# Patient Record
Sex: FEMALE | Race: WHITE
[De-identification: names, ages, dates, MRNs, and addresses within clinical notes are randomized per-mention and may not be internally consistent; named-entity substitution may affect disease eponyms.]

---

## 2019-01-15 ENCOUNTER — HOSPITAL ENCOUNTER (OUTPATIENT)
Dept: HOSPITAL 47 - RADMAMWWP | Age: 74
Discharge: HOME | End: 2019-01-15
Attending: FAMILY MEDICINE
Payer: MEDICARE

## 2019-01-15 DIAGNOSIS — Z12.31: Primary | ICD-10-CM

## 2019-01-15 PROCEDURE — 77067 SCR MAMMO BI INCL CAD: CPT

## 2019-01-15 PROCEDURE — 77063 BREAST TOMOSYNTHESIS BI: CPT

## 2019-01-27 NOTE — MM
Reason for exam: screening  (asymptomatic).



History:

Patient is postmenopausal.  No children.  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



MG 3D Screening Mammo W/Cad

Bilateral CC and MLO view(s) were taken.  XCCL view(s) were taken of the right 

breast.

No prior studies available for comparison.

There are scattered fibroglandular densities.

No discrete abnormality.  Previous images unavailable at this time, if they come 

available an addendum can be done.





ASSESSMENT: Benign, BI-RAD 2



RECOMMENDATION:

Routine screening mammogram of both breasts in 1 year.

## 2020-01-31 ENCOUNTER — HOSPITAL ENCOUNTER (OUTPATIENT)
Dept: HOSPITAL 47 - RADMAMWWP | Age: 75
Discharge: HOME | End: 2020-01-31
Attending: FAMILY MEDICINE
Payer: MEDICARE

## 2020-01-31 DIAGNOSIS — Z12.31: Primary | ICD-10-CM

## 2020-01-31 PROCEDURE — 77063 BREAST TOMOSYNTHESIS BI: CPT

## 2020-01-31 PROCEDURE — 77067 SCR MAMMO BI INCL CAD: CPT

## 2020-02-03 NOTE — MM
Reason for exam: screening  (asymptomatic).

Last mammogram was performed 1 year and 1 month ago.



History:

Patient is postmenopausal.



Physical Findings:

A clinical breast exam by your physician is recommended on an annual basis and 

results should be correlated with mammographic findings.



MG 3D Screening Mammo W/Cad

Bilateral CC and MLO view(s) were taken.

Prior study comparison: January 15, 2019, bilateral MG 3d screening mammo w/cad.

There are scattered fibroglandular densities.  Benign appearing bilateral 

calcifications. No suspicious abnormality.  No significant changes when compared 

with prior studies.





ASSESSMENT: Benign, BI-RAD 2



RECOMMENDATION:

Routine screening mammogram of both breasts in 1 year.

## 2020-02-04 ENCOUNTER — HOSPITAL ENCOUNTER (INPATIENT)
Dept: HOSPITAL 47 - EC | Age: 75
LOS: 8 days | Discharge: HOME | DRG: 682 | End: 2020-02-12
Attending: HOSPITALIST | Admitting: HOSPITALIST
Payer: MEDICARE

## 2020-02-04 VITALS — BODY MASS INDEX: 43.5 KG/M2

## 2020-02-04 DIAGNOSIS — Z79.890: ICD-10-CM

## 2020-02-04 DIAGNOSIS — M19.91: ICD-10-CM

## 2020-02-04 DIAGNOSIS — E11.649: ICD-10-CM

## 2020-02-04 DIAGNOSIS — N17.0: Primary | ICD-10-CM

## 2020-02-04 DIAGNOSIS — F32.9: ICD-10-CM

## 2020-02-04 DIAGNOSIS — Z90.49: ICD-10-CM

## 2020-02-04 DIAGNOSIS — E11.22: ICD-10-CM

## 2020-02-04 DIAGNOSIS — Z90.710: ICD-10-CM

## 2020-02-04 DIAGNOSIS — G93.41: ICD-10-CM

## 2020-02-04 DIAGNOSIS — I12.9: ICD-10-CM

## 2020-02-04 DIAGNOSIS — Z98.890: ICD-10-CM

## 2020-02-04 DIAGNOSIS — E78.5: ICD-10-CM

## 2020-02-04 DIAGNOSIS — E87.5: ICD-10-CM

## 2020-02-04 DIAGNOSIS — M89.8X9: ICD-10-CM

## 2020-02-04 DIAGNOSIS — N18.4: ICD-10-CM

## 2020-02-04 DIAGNOSIS — F41.9: ICD-10-CM

## 2020-02-04 DIAGNOSIS — Z85.42: ICD-10-CM

## 2020-02-04 DIAGNOSIS — Z82.49: ICD-10-CM

## 2020-02-04 DIAGNOSIS — E11.65: ICD-10-CM

## 2020-02-04 DIAGNOSIS — Z88.5: ICD-10-CM

## 2020-02-04 DIAGNOSIS — E66.01: ICD-10-CM

## 2020-02-04 DIAGNOSIS — Z79.899: ICD-10-CM

## 2020-02-04 DIAGNOSIS — Z83.3: ICD-10-CM

## 2020-02-04 DIAGNOSIS — E87.70: ICD-10-CM

## 2020-02-04 DIAGNOSIS — Z87.891: ICD-10-CM

## 2020-02-04 DIAGNOSIS — T46.4X5A: ICD-10-CM

## 2020-02-04 DIAGNOSIS — E83.39: ICD-10-CM

## 2020-02-04 DIAGNOSIS — E87.2: ICD-10-CM

## 2020-02-04 DIAGNOSIS — Z79.4: ICD-10-CM

## 2020-02-04 DIAGNOSIS — E83.42: ICD-10-CM

## 2020-02-04 LAB
ALBUMIN SERPL-MCNC: 3.8 G/DL (ref 3.5–5)
ALP SERPL-CCNC: 162 U/L (ref 38–126)
ALT SERPL-CCNC: 15 U/L (ref 4–34)
ANION GAP SERPL CALC-SCNC: 15 MMOL/L
APTT BLD: 22.5 SEC (ref 22–30)
AST SERPL-CCNC: 26 U/L (ref 14–36)
BASOPHILS # BLD AUTO: 0.1 K/UL (ref 0–0.2)
BASOPHILS NFR BLD AUTO: 1 %
BUN SERPL-SCNC: 70 MG/DL (ref 7–17)
CALCIUM SPEC-MCNC: 8.6 MG/DL (ref 8.4–10.2)
CHLORIDE SERPL-SCNC: 113 MMOL/L (ref 98–107)
CO2 SERPL-SCNC: 12 MMOL/L (ref 22–30)
EOSINOPHIL # BLD AUTO: 0.1 K/UL (ref 0–0.7)
EOSINOPHIL NFR BLD AUTO: 2 %
ERYTHROCYTE [DISTWIDTH] IN BLOOD BY AUTOMATED COUNT: 3.89 M/UL (ref 3.8–5.4)
ERYTHROCYTE [DISTWIDTH] IN BLOOD: 14.8 % (ref 11.5–15.5)
GLUCOSE BLD-MCNC: 109 MG/DL (ref 75–99)
GLUCOSE BLD-MCNC: 88 MG/DL (ref 75–99)
GLUCOSE SERPL-MCNC: 200 MG/DL (ref 74–99)
HCT VFR BLD AUTO: 36 % (ref 34–46)
HGB BLD-MCNC: 11.2 GM/DL (ref 11.4–16)
HYALINE CASTS UR QL AUTO: 3 /LPF (ref 0–2)
INR PPP: 0.9 (ref ?–1.2)
LYMPHOCYTES # SPEC AUTO: 1.5 K/UL (ref 1–4.8)
LYMPHOCYTES NFR SPEC AUTO: 23 %
MAGNESIUM SPEC-SCNC: 1.5 MG/DL (ref 1.6–2.3)
MCH RBC QN AUTO: 28.7 PG (ref 25–35)
MCHC RBC AUTO-ENTMCNC: 31.1 G/DL (ref 31–37)
MCV RBC AUTO: 92.5 FL (ref 80–100)
MONOCYTES # BLD AUTO: 0.4 K/UL (ref 0–1)
MONOCYTES NFR BLD AUTO: 6 %
NEUTROPHILS # BLD AUTO: 4.3 K/UL (ref 1.3–7.7)
NEUTROPHILS NFR BLD AUTO: 66 %
PH UR: 5.5 [PH] (ref 5–8)
PLATELET # BLD AUTO: 293 K/UL (ref 150–450)
POTASSIUM SERPL-SCNC: 5.7 MMOL/L (ref 3.5–5.1)
PROT SERPL-MCNC: 7.1 G/DL (ref 6.3–8.2)
PROT UR QL: (no result)
PT BLD: 9.6 SEC (ref 9–12)
RBC UR QL: 4 /HPF (ref 0–5)
SODIUM SERPL-SCNC: 140 MMOL/L (ref 137–145)
SP GR UR: 1.01 (ref 1–1.03)
SQUAMOUS UR QL AUTO: 5 /HPF (ref 0–4)
UROBILINOGEN UR QL STRIP: <2 MG/DL (ref ?–2)
WBC # BLD AUTO: 6.5 K/UL (ref 3.8–10.6)
WBC # UR AUTO: 6 /HPF (ref 0–5)

## 2020-02-04 PROCEDURE — 85730 THROMBOPLASTIN TIME PARTIAL: CPT

## 2020-02-04 PROCEDURE — 99285 EMERGENCY DEPT VISIT HI MDM: CPT

## 2020-02-04 PROCEDURE — 77001 FLUOROGUIDE FOR VEIN DEVICE: CPT

## 2020-02-04 PROCEDURE — 94640 AIRWAY INHALATION TREATMENT: CPT

## 2020-02-04 PROCEDURE — 83735 ASSAY OF MAGNESIUM: CPT

## 2020-02-04 PROCEDURE — 93880 EXTRACRANIAL BILAT STUDY: CPT

## 2020-02-04 PROCEDURE — 80306 DRUG TEST PRSMV INSTRMNT: CPT

## 2020-02-04 PROCEDURE — 70450 CT HEAD/BRAIN W/O DYE: CPT

## 2020-02-04 PROCEDURE — 80048 BASIC METABOLIC PNL TOTAL CA: CPT

## 2020-02-04 PROCEDURE — 85025 COMPLETE CBC W/AUTO DIFF WBC: CPT

## 2020-02-04 PROCEDURE — 86706 HEP B SURFACE ANTIBODY: CPT

## 2020-02-04 PROCEDURE — 71045 X-RAY EXAM CHEST 1 VIEW: CPT

## 2020-02-04 PROCEDURE — 84484 ASSAY OF TROPONIN QUANT: CPT

## 2020-02-04 PROCEDURE — 83880 ASSAY OF NATRIURETIC PEPTIDE: CPT

## 2020-02-04 PROCEDURE — 36415 COLL VENOUS BLD VENIPUNCTURE: CPT

## 2020-02-04 PROCEDURE — 96361 HYDRATE IV INFUSION ADD-ON: CPT

## 2020-02-04 PROCEDURE — 94760 N-INVAS EAR/PLS OXIMETRY 1: CPT

## 2020-02-04 PROCEDURE — 71046 X-RAY EXAM CHEST 2 VIEWS: CPT

## 2020-02-04 PROCEDURE — 90935 HEMODIALYSIS ONE EVALUATION: CPT

## 2020-02-04 PROCEDURE — 86704 HEP B CORE ANTIBODY TOTAL: CPT

## 2020-02-04 PROCEDURE — 85610 PROTHROMBIN TIME: CPT

## 2020-02-04 PROCEDURE — 80053 COMPREHEN METABOLIC PANEL: CPT

## 2020-02-04 PROCEDURE — 81001 URINALYSIS AUTO W/SCOPE: CPT

## 2020-02-04 PROCEDURE — 84100 ASSAY OF PHOSPHORUS: CPT

## 2020-02-04 PROCEDURE — 93005 ELECTROCARDIOGRAM TRACING: CPT

## 2020-02-04 PROCEDURE — 96374 THER/PROPH/DIAG INJ IV PUSH: CPT

## 2020-02-04 PROCEDURE — 87340 HEPATITIS B SURFACE AG IA: CPT

## 2020-02-04 PROCEDURE — 76937 US GUIDE VASCULAR ACCESS: CPT

## 2020-02-04 PROCEDURE — 36558 INSERT TUNNELED CV CATH: CPT

## 2020-02-04 RX ADMIN — CEFAZOLIN SCH MLS/HR: 330 INJECTION, POWDER, FOR SOLUTION INTRAMUSCULAR; INTRAVENOUS at 21:37

## 2020-02-04 RX ADMIN — OXYCODONE HYDROCHLORIDE AND ACETAMINOPHEN PRN EACH: 10; 325 TABLET ORAL at 21:36

## 2020-02-04 RX ADMIN — CYANOCOBALAMIN TAB 500 MCG SCH MCG: 500 TAB at 21:37

## 2020-02-04 RX ADMIN — ONDANSETRON PRN MG: 2 INJECTION INTRAMUSCULAR; INTRAVENOUS at 23:48

## 2020-02-04 RX ADMIN — DILTIAZEM HYDROCHLORIDE SCH MG: 60 TABLET, FILM COATED ORAL at 21:36

## 2020-02-04 NOTE — CT
EXAMINATION TYPE: CT brain wo con

 

DATE OF EXAM: 2/4/2020

 

COMPARISON: None

 

HISTORY: weakness and dizziness.

 

CT DLP: 1074.4 mGycm

Automated exposure control for dose reduction was used.

 

Multiple axial sections were obtained of the brain without contrast.

 

There is mild cerebral atrophy. There is no mass effect nor midline shift. There is no sign of intrac
ranial hemorrhage. Calvarium is intact. There is no evidence of cerebral edema.

 

IMPRESSION:

Mild atrophy. No acute intracranial abnormality.

## 2020-02-04 NOTE — XR
EXAMINATION TYPE: XR chest 2V

 

DATE OF EXAM: 2/4/2020

 

COMPARISON: NONE

 

HISTORY: Short of breath

 

TECHNIQUE: 2 views

 

FINDINGS: There is elevated right diaphragm. There is linear density right lung base. There is no hea
rt failure. Left lung is clear. There are chest leads. Heart size is fairly normal.

 

IMPRESSION: There is some atelectasis right lung base with elevated right diaphragm.

## 2020-02-04 NOTE — ED
General Adult HPI





- General


Source: patient, family, RN notes reviewed, old records reviewed


Mode of arrival: ambulatory


Limitations: no limitations





<Presley Perez - Last Filed: 02/04/20 16:46>





<Presley Brady - Last Filed: 02/04/20 18:07>





- General


Chief complaint: Shortness of Breath


Stated complaint: LANETTE


Time Seen by Provider: 02/04/20 15:00





- History of Present Illness


Initial comments: 





This is a 74-year-old female presents emergency Department stating that since 

last Wednesday she has not been feeling well.  Patient states on Wednesday her 

sugar was low at 43 and EMS was called they gave her some glucose and she's felt

at that time.  Patient states since then however she vomits almost every time 

she eats.  Patient also states she's more short of breath when she ambulates.  

Patient states she's always somewhat short of breath when she walks around but 

since Wednesday is worse.  Patient also states that she is very forgetful and 

that is significantly worsen her baseline and it all occurred since last 

Wednesday.  Patient denies any focal deficits.  Patient denies any 

lightheadedness or dizziness.  Patient denies any chest pain or palpitations.  

Patient states while lying in bed she's had no difficulty breathing.  Patient 

denies any fever chills or cough per patient denies abdominal pain.  Patient 

denies any diarrhea.  Fact patient states she thinks she is mildly constipated. 

Patient denies any swelling to the legs.  Patient denies starting any new 

medications (Presley Perez)





- Related Data


                                    Allergies











Allergy/AdvReac Type Severity Reaction Status Date / Time


 


naproxen [From Naprosyn] Allergy  Dyspnea Verified 02/04/20 14:47














Review of Systems


ROS Other: All systems not noted in ROS Statement are negative.





<Presley Perez - Last Filed: 02/04/20 16:46>


ROS Other: All systems not noted in ROS Statement are negative.





<Presley Brady - Last Filed: 02/04/20 18:07>


ROS Statement: 


Those systems with pertinent positive or pertinent negative responses have been 

documented in the HPI.








Past Medical History


Past Medical History: Cancer, Diabetes Mellitus, Hyperlipidemia, Hypertension, 

Renal Disease


Additional Past Medical History / Comment(s): uterine ca


History of Any Multi-Drug Resistant Organisms: None Reported


Past Surgical History: Bariatric Surgery, Cholecystectomy, Hysterectomy, Joint 

Replacement


Additional Past Surgical History / Comment(s): rufina knee,rt foot,


Past Psychological History: Depression


Smoking Status: Former smoker


Past Alcohol Use History: None Reported


Past Drug Use History: None Reported





<Presley Perez - Last Filed: 02/04/20 16:46>





General Exam


Limitations: no limitations





<Presley Perez - Last Filed: 02/04/20 16:46>





- General Exam Comments


Initial Comments: 





GENERAL:


Patient is well-developed and well-nourished.  Patient is nontoxic and well-

hydrated and is in  acute distress.





ENT:


Neck is soft and supple.  No significant lymphadenopathy is noted.  Oropharynx 

is clear.  Moist mucous membranes.  Neck has full range of motion without 

eliciting any pain.  





EYES:


The sclera were anicteric and conjunctiva were pink and moist.  Extraocular 

movements were intact and pupils were equal round and reactive to light.  

Eyelids were unremarkable.





PULMONARY:


Unlabored respirations.  Good breath sounds bilaterally.  No audible rales 

rhonchi or wheezing was noted.





CARDIOVASCULAR:


There is a regular rate and rhythm without any murmurs gallops or rubs.  





ABDOMEN:


Soft and nontender with normal bowel sounds.  No palpable organomegaly was 

noted.  There is no palpable pulsatile mass.





SKIN:


Skin is clear with no lesions or rashes and otherwise unremarkable.





NEUROLOGIC:


Patient is alert and oriented x3.  Cranial nerves II through XII are grossly 

intact.  Motor and sensory are also intact.  Normal speech, volume and content. 

Symmetrical smile.





MUSCULOSKELETAL:


Normal extremities with adequate strength and full range of motion.  





LYMPHATICS:


No significant lymphadenopathy is noted





PSYCHIATRIC:


Normal psychiatric evaluation. (Presley Perez)





Course





<Presley Brady - Last Filed: 02/04/20 18:07>





                                   Vital Signs











  02/04/20 02/04/20





  14:43 17:23


 


Temperature 98.0 F 


 


Pulse Rate 88 75


 


Respiratory 22 18





Rate  


 


Blood Pressure 182/82 158/77


 


O2 Sat by Pulse 97 97





Oximetry  














- Reevaluation(s)


Reevaluation #1: 





02/04/20 18:05


Medical records reviewed (Presley Brady)


Reevaluation #2: 





02/04/20 18:05


Spoke with patient regarding findings, questions are answered here in the ER, 

patient still feels weak and dizzy lightheaded (Presley Brady)





- Consultations


Consultation #1: 





Spoke with Dr. Valle agreeable to admit (Presley Brady)





Medical Decision Making





- Lab Data


Result diagrams: 


                                 02/04/20 15:00





                                 02/04/20 15:00





<Presley Perez - Last Filed: 02/04/20 16:46>





- Lab Data


Result diagrams: 


                                 02/04/20 15:00





                                 02/04/20 15:00





- Radiology Data


Radiology results: report reviewed (CT brain CXR negative for acute disaease), 

image reviewed





<Presley Brady - Last Filed: 02/04/20 18:07>





- Medical Decision Making





EKG shows sinus rhythm at 84 bpm with an occasional PAC.  AL interval 256 

dresses 86 QT interval 360 QTC is 425.  Patient's EKG shows no ST segment 

elevation or depression.





Dr. Brady will take over the care of this patient at 5 PM (Presley Perez)


74 female to the ER for evaluation patient presents today for evaluation regards

to weakness persistent nausea vomiting and not feeling altered mental status 

patient is significantly uremia failure will admit for IV hydration due to 

dermatology to evaluate (Presley Brady)





- Lab Data





                                   Lab Results











  02/04/20 02/04/20 02/04/20 Range/Units





  15:00 15:00 15:00 


 


WBC  6.5    (3.8-10.6)  k/uL


 


RBC  3.89    (3.80-5.40)  m/uL


 


Hgb  11.2 L    (11.4-16.0)  gm/dL


 


Hct  36.0    (34.0-46.0)  %


 


MCV  92.5    (80.0-100.0)  fL


 


MCH  28.7    (25.0-35.0)  pg


 


MCHC  31.1    (31.0-37.0)  g/dL


 


RDW  14.8    (11.5-15.5)  %


 


Plt Count  293    (150-450)  k/uL


 


Neutrophils %  66    %


 


Lymphocytes %  23    %


 


Monocytes %  6    %


 


Eosinophils %  2    %


 


Basophils %  1    %


 


Neutrophils #  4.3    (1.3-7.7)  k/uL


 


Lymphocytes #  1.5    (1.0-4.8)  k/uL


 


Monocytes #  0.4    (0-1.0)  k/uL


 


Eosinophils #  0.1    (0-0.7)  k/uL


 


Basophils #  0.1    (0-0.2)  k/uL


 


Hypochromasia  Slight    


 


PT    9.6  (9.0-12.0)  sec


 


INR    0.9  (<1.2)  


 


APTT    22.5  (22.0-30.0)  sec


 


Sodium   140   (137-145)  mmol/L


 


Potassium   5.7 H   (3.5-5.1)  mmol/L


 


Chloride   113 H   ()  mmol/L


 


Carbon Dioxide   12 L   (22-30)  mmol/L


 


Anion Gap   15   mmol/L


 


BUN   70 H   (7-17)  mg/dL


 


Creatinine   6.06 H   (0.52-1.04)  mg/dL


 


Est GFR (CKD-EPI)AfAm   7   (>60 ml/min/1.73 sqM)  


 


Est GFR (CKD-EPI)NonAf   6   (>60 ml/min/1.73 sqM)  


 


Glucose   200 H   (74-99)  mg/dL


 


POC Glucose (mg/dL)     (75-99)  mg/dL


 


POC Glu Operater ID     


 


Calcium   8.6   (8.4-10.2)  mg/dL


 


Total Bilirubin   0.4   (0.2-1.3)  mg/dL


 


AST   26   (14-36)  U/L


 


ALT   15   (4-34)  U/L


 


Alkaline Phosphatase   162 H   ()  U/L


 


Troponin I     (0.000-0.034)  ng/mL


 


NT-Pro-B Natriuret Pep     pg/mL


 


Total Protein   7.1   (6.3-8.2)  g/dL


 


Albumin   3.8   (3.5-5.0)  g/dL


 


Urine Color     


 


Urine Appearance     (Clear)  


 


Urine pH     (5.0-8.0)  


 


Ur Specific Gravity     (1.001-1.035)  


 


Urine Protein     (Negative)  


 


Urine Glucose (UA)     (Negative)  


 


Urine Ketones     (Negative)  


 


Urine Blood     (Negative)  


 


Urine Nitrite     (Negative)  


 


Urine Bilirubin     (Negative)  


 


Urine Urobilinogen     (<2.0)  mg/dL


 


Ur Leukocyte Esterase     (Negative)  


 


Urine RBC     (0-5)  /hpf


 


Urine WBC     (0-5)  /hpf


 


Ur Squamous Epith Cells     (0-4)  /hpf


 


Urine Bacteria     (None)  /hpf


 


Hyaline Casts     (0-2)  /lpf


 


Urine Mucus     (None)  /hpf


 


Urine Opiates Screen     (NotDetected)  


 


Ur Oxycodone Screen     (NotDetected)  


 


Urine Methadone Screen     (NotDetected)  


 


Ur Propoxyphene Screen     (NotDetected)  


 


Ur Barbiturates Screen     (NotDetected)  


 


U Tricyclic Antidepress     (NotDetected)  


 


Ur Phencyclidine Scrn     (NotDetected)  


 


Ur Amphetamines Screen     (NotDetected)  


 


U Methamphetamines Scrn     (NotDetected)  


 


U Benzodiazepines Scrn     (NotDetected)  


 


Urine Cocaine Screen     (NotDetected)  


 


U Marijuana (THC) Screen     (NotDetected)  














  02/04/20 02/04/20 02/04/20 Range/Units





  15:00 15:00 17:15 


 


WBC     (3.8-10.6)  k/uL


 


RBC     (3.80-5.40)  m/uL


 


Hgb     (11.4-16.0)  gm/dL


 


Hct     (34.0-46.0)  %


 


MCV     (80.0-100.0)  fL


 


MCH     (25.0-35.0)  pg


 


MCHC     (31.0-37.0)  g/dL


 


RDW     (11.5-15.5)  %


 


Plt Count     (150-450)  k/uL


 


Neutrophils %     %


 


Lymphocytes %     %


 


Monocytes %     %


 


Eosinophils %     %


 


Basophils %     %


 


Neutrophils #     (1.3-7.7)  k/uL


 


Lymphocytes #     (1.0-4.8)  k/uL


 


Monocytes #     (0-1.0)  k/uL


 


Eosinophils #     (0-0.7)  k/uL


 


Basophils #     (0-0.2)  k/uL


 


Hypochromasia     


 


PT     (9.0-12.0)  sec


 


INR     (<1.2)  


 


APTT     (22.0-30.0)  sec


 


Sodium     (137-145)  mmol/L


 


Potassium     (3.5-5.1)  mmol/L


 


Chloride     ()  mmol/L


 


Carbon Dioxide     (22-30)  mmol/L


 


Anion Gap     mmol/L


 


BUN     (7-17)  mg/dL


 


Creatinine     (0.52-1.04)  mg/dL


 


Est GFR (CKD-EPI)AfAm     (>60 ml/min/1.73 sqM)  


 


Est GFR (CKD-EPI)NonAf     (>60 ml/min/1.73 sqM)  


 


Glucose     (74-99)  mg/dL


 


POC Glucose (mg/dL)    109 H  (75-99)  mg/dL


 


POC Glu Operater ID    Esau Patricia  


 


Calcium     (8.4-10.2)  mg/dL


 


Total Bilirubin     (0.2-1.3)  mg/dL


 


AST     (14-36)  U/L


 


ALT     (4-34)  U/L


 


Alkaline Phosphatase     ()  U/L


 


Troponin I  <0.012    (0.000-0.034)  ng/mL


 


NT-Pro-B Natriuret Pep   1190   pg/mL


 


Total Protein     (6.3-8.2)  g/dL


 


Albumin     (3.5-5.0)  g/dL


 


Urine Color     


 


Urine Appearance     (Clear)  


 


Urine pH     (5.0-8.0)  


 


Ur Specific Gravity     (1.001-1.035)  


 


Urine Protein     (Negative)  


 


Urine Glucose (UA)     (Negative)  


 


Urine Ketones     (Negative)  


 


Urine Blood     (Negative)  


 


Urine Nitrite     (Negative)  


 


Urine Bilirubin     (Negative)  


 


Urine Urobilinogen     (<2.0)  mg/dL


 


Ur Leukocyte Esterase     (Negative)  


 


Urine RBC     (0-5)  /hpf


 


Urine WBC     (0-5)  /hpf


 


Ur Squamous Epith Cells     (0-4)  /hpf


 


Urine Bacteria     (None)  /hpf


 


Hyaline Casts     (0-2)  /lpf


 


Urine Mucus     (None)  /hpf


 


Urine Opiates Screen     (NotDetected)  


 


Ur Oxycodone Screen     (NotDetected)  


 


Urine Methadone Screen     (NotDetected)  


 


Ur Propoxyphene Screen     (NotDetected)  


 


Ur Barbiturates Screen     (NotDetected)  


 


U Tricyclic Antidepress     (NotDetected)  


 


Ur Phencyclidine Scrn     (NotDetected)  


 


Ur Amphetamines Screen     (NotDetected)  


 


U Methamphetamines Scrn     (NotDetected)  


 


U Benzodiazepines Scrn     (NotDetected)  


 


Urine Cocaine Screen     (NotDetected)  


 


U Marijuana (THC) Screen     (NotDetected)  














  02/04/20 Range/Units





  Unknown 


 


WBC   (3.8-10.6)  k/uL


 


RBC   (3.80-5.40)  m/uL


 


Hgb   (11.4-16.0)  gm/dL


 


Hct   (34.0-46.0)  %


 


MCV   (80.0-100.0)  fL


 


MCH   (25.0-35.0)  pg


 


MCHC   (31.0-37.0)  g/dL


 


RDW   (11.5-15.5)  %


 


Plt Count   (150-450)  k/uL


 


Neutrophils %   %


 


Lymphocytes %   %


 


Monocytes %   %


 


Eosinophils %   %


 


Basophils %   %


 


Neutrophils #   (1.3-7.7)  k/uL


 


Lymphocytes #   (1.0-4.8)  k/uL


 


Monocytes #   (0-1.0)  k/uL


 


Eosinophils #   (0-0.7)  k/uL


 


Basophils #   (0-0.2)  k/uL


 


Hypochromasia   


 


PT   (9.0-12.0)  sec


 


INR   (<1.2)  


 


APTT   (22.0-30.0)  sec


 


Sodium   (137-145)  mmol/L


 


Potassium   (3.5-5.1)  mmol/L


 


Chloride   ()  mmol/L


 


Carbon Dioxide   (22-30)  mmol/L


 


Anion Gap   mmol/L


 


BUN   (7-17)  mg/dL


 


Creatinine   (0.52-1.04)  mg/dL


 


Est GFR (CKD-EPI)AfAm   (>60 ml/min/1.73 sqM)  


 


Est GFR (CKD-EPI)NonAf   (>60 ml/min/1.73 sqM)  


 


Glucose   (74-99)  mg/dL


 


POC Glucose (mg/dL)   (75-99)  mg/dL


 


POC Glu Operater ID   


 


Calcium   (8.4-10.2)  mg/dL


 


Total Bilirubin   (0.2-1.3)  mg/dL


 


AST   (14-36)  U/L


 


ALT   (4-34)  U/L


 


Alkaline Phosphatase   ()  U/L


 


Troponin I   (0.000-0.034)  ng/mL


 


NT-Pro-B Natriuret Pep   pg/mL


 


Total Protein   (6.3-8.2)  g/dL


 


Albumin   (3.5-5.0)  g/dL


 


Urine Color  Light Yellow  


 


Urine Appearance  Cloudy H  (Clear)  


 


Urine pH  5.5  (5.0-8.0)  


 


Ur Specific Gravity  1.012  (1.001-1.035)  


 


Urine Protein  2+ H  (Negative)  


 


Urine Glucose (UA)  Trace H  (Negative)  


 


Urine Ketones  Negative  (Negative)  


 


Urine Blood  Trace H  (Negative)  


 


Urine Nitrite  Negative  (Negative)  


 


Urine Bilirubin  Negative  (Negative)  


 


Urine Urobilinogen  <2.0  (<2.0)  mg/dL


 


Ur Leukocyte Esterase  Large H  (Negative)  


 


Urine RBC  4  (0-5)  /hpf


 


Urine WBC  6 H  (0-5)  /hpf


 


Ur Squamous Epith Cells  5 H  (0-4)  /hpf


 


Urine Bacteria  Occasional H  (None)  /hpf


 


Hyaline Casts  3 H  (0-2)  /lpf


 


Urine Mucus  Rare H  (None)  /hpf


 


Urine Opiates Screen  Not Detected  (NotDetected)  


 


Ur Oxycodone Screen  Detected H  (NotDetected)  


 


Urine Methadone Screen  Not Detected  (NotDetected)  


 


Ur Propoxyphene Screen  Not Detected  (NotDetected)  


 


Ur Barbiturates Screen  Not Detected  (NotDetected)  


 


U Tricyclic Antidepress  Not Detected  (NotDetected)  


 


Ur Phencyclidine Scrn  Not Detected  (NotDetected)  


 


Ur Amphetamines Screen  Not Detected  (NotDetected)  


 


U Methamphetamines Scrn  Not Detected  (NotDetected)  


 


U Benzodiazepines Scrn  Not Detected  (NotDetected)  


 


Urine Cocaine Screen  Not Detected  (NotDetected)  


 


U Marijuana (THC) Screen  Not Detected  (NotDetected)  














Disposition





<Presley Perez - Last Filed: 02/04/20 16:46>


Is patient prescribed a controlled substance at d/c from ED?: No





<Presley Brady - Last Filed: 02/04/20 18:07>


Clinical Impression: 


 Weakness, Altered mental status, Uremia, Acute renal failure





Disposition: ADMITTED AS IP TO THIS hospitals


Condition: Good


Referrals: 


Esau Ashraf DO [Primary Care Provider] - 1-2 days

## 2020-02-05 LAB
ALBUMIN SERPL-MCNC: 2.9 G/DL (ref 3.5–5)
ALP SERPL-CCNC: 130 U/L (ref 38–126)
ALT SERPL-CCNC: 13 U/L (ref 4–34)
ANION GAP SERPL CALC-SCNC: 9 MMOL/L
AST SERPL-CCNC: 25 U/L (ref 14–36)
BUN SERPL-SCNC: 64 MG/DL (ref 7–17)
CALCIUM SPEC-MCNC: 7.7 MG/DL (ref 8.4–10.2)
CHLORIDE SERPL-SCNC: 118 MMOL/L (ref 98–107)
CO2 SERPL-SCNC: 14 MMOL/L (ref 22–30)
GLUCOSE BLD-MCNC: 114 MG/DL (ref 75–99)
GLUCOSE BLD-MCNC: 124 MG/DL (ref 75–99)
GLUCOSE BLD-MCNC: 174 MG/DL (ref 75–99)
GLUCOSE BLD-MCNC: 71 MG/DL (ref 75–99)
GLUCOSE SERPL-MCNC: 68 MG/DL (ref 74–99)
POTASSIUM SERPL-SCNC: 5 MMOL/L (ref 3.5–5.1)
PROT SERPL-MCNC: 5.7 G/DL (ref 6.3–8.2)
SODIUM SERPL-SCNC: 141 MMOL/L (ref 137–145)

## 2020-02-05 RX ADMIN — OXYCODONE HYDROCHLORIDE AND ACETAMINOPHEN PRN EACH: 10; 325 TABLET ORAL at 09:00

## 2020-02-05 RX ADMIN — CYANOCOBALAMIN TAB 500 MCG SCH MCG: 500 TAB at 21:12

## 2020-02-05 RX ADMIN — OXYCODONE HYDROCHLORIDE AND ACETAMINOPHEN PRN EACH: 10; 325 TABLET ORAL at 17:29

## 2020-02-05 RX ADMIN — INSULIN ASPART SCH UNIT: 100 INJECTION, SOLUTION INTRAVENOUS; SUBCUTANEOUS at 17:27

## 2020-02-05 RX ADMIN — ONDANSETRON PRN MG: 2 INJECTION INTRAMUSCULAR; INTRAVENOUS at 12:02

## 2020-02-05 RX ADMIN — IPRATROPIUM BROMIDE AND ALBUTEROL SULFATE SCH ML: .5; 3 SOLUTION RESPIRATORY (INHALATION) at 21:45

## 2020-02-05 RX ADMIN — BISACODYL PRN MG: 5 TABLET, COATED ORAL at 04:08

## 2020-02-05 RX ADMIN — ONDANSETRON PRN MG: 2 INJECTION INTRAMUSCULAR; INTRAVENOUS at 17:06

## 2020-02-05 RX ADMIN — ATORVASTATIN CALCIUM SCH MG: 20 TABLET, FILM COATED ORAL at 08:57

## 2020-02-05 RX ADMIN — DILTIAZEM HYDROCHLORIDE SCH MG: 60 TABLET, FILM COATED ORAL at 21:12

## 2020-02-05 RX ADMIN — OXYCODONE HYDROCHLORIDE AND ACETAMINOPHEN PRN EACH: 10; 325 TABLET ORAL at 23:33

## 2020-02-05 RX ADMIN — CITALOPRAM HYDROBROMIDE SCH MG: 20 TABLET ORAL at 08:57

## 2020-02-05 RX ADMIN — POTASSIUM CHLORIDE SCH MLS/HR: 14.9 INJECTION, SOLUTION INTRAVENOUS at 12:04

## 2020-02-05 RX ADMIN — LEVOTHYROXINE SODIUM SCH MCG: 0.11 TABLET ORAL at 06:31

## 2020-02-05 RX ADMIN — POTASSIUM CHLORIDE SCH MLS/HR: 14.9 INJECTION, SOLUTION INTRAVENOUS at 23:44

## 2020-02-05 RX ADMIN — INSULIN ASPART SCH: 100 INJECTION, SOLUTION INTRAVENOUS; SUBCUTANEOUS at 21:13

## 2020-02-05 RX ADMIN — ONDANSETRON PRN MG: 2 INJECTION INTRAMUSCULAR; INTRAVENOUS at 06:34

## 2020-02-05 RX ADMIN — ENOXAPARIN SODIUM SCH MG: 30 INJECTION SUBCUTANEOUS at 21:12

## 2020-02-05 RX ADMIN — CEFAZOLIN SCH MLS/HR: 330 INJECTION, POWDER, FOR SOLUTION INTRAMUSCULAR; INTRAVENOUS at 06:31

## 2020-02-05 RX ADMIN — DILTIAZEM HYDROCHLORIDE SCH MG: 60 TABLET, FILM COATED ORAL at 08:57

## 2020-02-05 NOTE — P.HPIM
History of Present Illness


H&P Date: 02/05/20


Chief Complaint: Tired





History of presenting complaint:


This is a pleasant 74 a patient of Dr. Ashraf.  Chronic stable medical 

conditions include hypertension, hyperlipidemia, depression, anxiety, 

osteoarthritis.  Patient about a week ago felt that she cannot really speak felt

out of sorts.  EMS was called out.  Follow Accu-Cheks to be 43.  Received 

glucose and felt better.  She did not go to the hospital.  Since then she been 

having nausea vomiting and predominantly throwing up.  She went to see a pain 

specialist Dr. Roy and she was again incoherent of that.  Decided to send 

her to the ER.  No abdominal pain.  Has been having some chills.  In the ER 

found to be in acute renal failure.  Patient's baseline creatinine per 

nephrology rundown 2.2.





Review of systems:


GEN.:  Tired


EYES: None


HEENT: None


NECK: None


RESPIRATORY: None


CARDIOVASCULAR: None


GASTROINTESTINAL: None


GENITOURINARY: None


MUSCULOSKELETAL: Joint pains


LYMPHATICS: None


HEMATOLOGICAL: None  


PSYCHIATRY: None


NEUROLOGICAL: No focal currently





Past medical history to include:


Hypertension, hyperlipidemia, depression, anxiety, osteoarthritis, chronic 

kidney disease stage IV, uterine cancer, bariatric surgery, depression, 

osteoarthritis





Social history:


Does smoke in the past.  This is a long-standing friend.  Does use a cane.





Physical examination:


VITAL SIGNS: Reviewed in today's electronic records


GENERAL: [BMI 44.2, laying in bed awake a bit tired,].


EYES: [Pupils equal.  Conjunctiva ariel]l.


HEENT: [External appearance of nose and ears normal, oral cavity grossly 

normal].


NECK: [JVD not raised; masses not palpable].


HEART: [First and second heart sounds are normal;  no edema].  


LUNGS:[ Respiratory rate normal; clear to auscultation].  


ABDOMEN: [Soft,  nontender, liver spleen not palpable, no masses palpable].  


PSYCH: [Alert and oriented x3;  mood  and affect ariel]


MUSCULOSKELETAL: Evidence of OA especially in the hands l.  


NEUROLOGICAL: [Cranial nerves grossly intact; no facial asymmetry,   power and 

sensation grossly intact]. 


LYMPHATICS: [No lymph nodes palpable in the axilla and neck]





INVESTIGATIONS, reviewed in the clinical context:


White count 6.5 hemoglobin 11.2 platelets 293 pressure 5.7 bun 70 creatinine 

6.06


Baseline creatinine supposed to be 2.2


UA positive for leukoesterase, WBC


Urine drug screen positive for oxycodone


EKG tracing personally reviewed by me shows-sinus rhythm


Chest x-ray film personally reviewed by me-elevated right diaphragm, lung fields

are clear


Computed tomography scan of the brain-lung fields clear





Assessment:


-Diabetes mellitus type 2 uncontrolled with episodes of hypoglycemia, 

symptomatic


-Acute kidney injury likely ATN with a contribution from patient being on ACE 

inhibitor Lasix


-Chronic kidney disease stage IV from diabetic nephropathy


-Hyperlipidemia


-Essential hypertension, uncontrolled


-Depression not otherwise


-Primary osteoarthritis


-Morbid obesity BMI 44.2


-Hyperkalemia secondary to acute kidney injury and patient be on ACE inhibitor


-Hypomagnesemia


-Hyperphosphatemia





Plan:


Patient started on IV fluids.  ACE inhibitor Lasix have been discontinued.  

Nephrology was consulted.  We'll get carotid Doppler.  Patient does not have any

focal neurological findings.  Electrolytes followed closely.


Amlodipine medications for blood pressure.





Past Medical History


Past Medical History: Cancer, Diabetes Mellitus, Hyperlipidemia, Hypertension, 

Renal Disease


Additional Past Medical History / Comment(s): uterine ca


History of Any Multi-Drug Resistant Organisms: None Reported


Past Surgical History: Bariatric Surgery, Cholecystectomy, Hysterectomy, Joint 

Replacement


Additional Past Surgical History / Comment(s): rufina knee,rt foot,


Past Psychological History: Depression


Smoking Status: Former smoker


Past Alcohol Use History: None Reported


Past Drug Use History: None Reported





- Past Family History


  ** Mother


Family Medical History: Diabetes Mellitus, Hypertension





  ** Father


Family Medical History: Diabetes Mellitus, Hypertension





  ** Sister(s)


Family Medical History: Diabetes Mellitus





Medications and Allergies


                                Home Medications











 Medication  Instructions  Recorded  Confirmed  Type


 


Calcitriol 0.5 mcg PO LARIOS 02/04/20 02/04/20 History


 


Cholecalciferol [Vitamin D3 (25 2,000 unit PO  02/04/20 02/04/20 History





Mcg = 1000 Iu)]    


 


Citalopram Hydrobromide [CeleXA] 40 mg PO DAILY 02/04/20 02/04/20 History


 


Cyanocobalamin (Vitamin B-12) 1,000 mcg PO  02/04/20 02/04/20 History





[Vitamin B-12]    


 


Diltiazem HCl 120 mg PO BID 02/04/20 02/04/20 History


 


Furosemide [Lasix] 20 mg PO DAILY 02/04/20 02/04/20 History


 


Levothyroxine Sodium [Synthroid] 112 mcg PO DAILY 02/04/20 02/04/20 History


 


Lisinopril [Zestril] 5 mg PO DAILY 02/04/20 02/04/20 History


 


Multivitamin [Multivitamins Adult 1 tab PO HS 02/04/20 02/04/20 History





Gummies]    


 


Rosuvastatin Calcium [Crestor] 10 mg PO DAILY 02/04/20 02/04/20 History


 


glipiZIDE [Glucotrol] 10 mg PO AC-BID 02/04/20 02/04/20 History


 


oxyCODONE-APAP 10-325MG [Percocet 1 tab PO TID PRN 02/04/20 02/04/20 History





 mg]    








                                    Allergies











Allergy/AdvReac Type Severity Reaction Status Date / Time


 


naproxen [From Naprosyn] Allergy  Dyspnea Verified 02/04/20 14:47














Physical Exam


Vitals: 


                                   Vital Signs











  Temp Pulse Pulse Resp BP BP Pulse Ox


 


 02/05/20 08:00  98.9 F   82  18   179/86  97


 


 02/05/20 04:00  98.0 F   80  17   182/77  96


 


 02/05/20 00:00  97.8 F   83  18   139/62  95


 


 02/04/20 21:08  97.8 F   80  18   179/82  100


 


 02/04/20 20:18  98.0 F  81   18  145/86   97


 


 02/04/20 20:08   81   18  145/86   97


 


 02/04/20 20:00    82  18   


 


 02/04/20 17:23   75   18  158/77   97


 


 02/04/20 14:43  98.0 F  88   22  182/82   97








                                Intake and Output











 02/04/20 02/05/20 02/05/20





 22:59 06:59 14:59


 


Intake Total 125  360


 


Output Total  800 300


 


Balance 125 -800 60


 


Intake:   


 


  Intake, IV Titration 125  





  Amount   


 


    Sodium Chloride 0.9% 1, 125  





    000 ml @ 125 mls/hr IV .   





    Q8H [facility] Rx#:[id number]   


 


  Oral   360


 


Output:   


 


  Urine  800 300


 


Other:   


 


  Voiding Method Toilet Toilet Toilet


 


  # Voids  2 1


 


  # Bowel Movements   1


 


  Weight 111.13 kg 113.2 kg 














Results


CBC & Chem 7: 


                                 02/04/20 15:00





                                 02/05/20 05:36


Labs: 


                  Abnormal Lab Results - Last 24 Hours (Table)











  02/04/20 02/04/20 02/04/20 Range/Units





  15:00 15:00 15:00 


 


Hgb  11.2 L    (11.4-16.0)  gm/dL


 


Potassium   5.7 H   (3.5-5.1)  mmol/L


 


Chloride   113 H   ()  mmol/L


 


Carbon Dioxide   12 L   (22-30)  mmol/L


 


BUN   70 H   (7-17)  mg/dL


 


Creatinine   6.06 H   (0.52-1.04)  mg/dL


 


Glucose   200 H   (74-99)  mg/dL


 


POC Glucose (mg/dL)     (75-99)  mg/dL


 


Calcium     (8.4-10.2)  mg/dL


 


Phosphorus    7.0 H  (2.5-4.5)  mg/dL


 


Magnesium    1.5 L  (1.6-2.3)  mg/dL


 


Alkaline Phosphatase   162 H   ()  U/L


 


Total Protein     (6.3-8.2)  g/dL


 


Albumin     (3.5-5.0)  g/dL


 


Urine Appearance     (Clear)  


 


Urine Protein     (Negative)  


 


Urine Glucose (UA)     (Negative)  


 


Urine Blood     (Negative)  


 


Ur Leukocyte Esterase     (Negative)  


 


Urine WBC     (0-5)  /hpf


 


Ur Squamous Epith Cells     (0-4)  /hpf


 


Urine Bacteria     (None)  /hpf


 


Hyaline Casts     (0-2)  /lpf


 


Urine Mucus     (None)  /hpf


 


Ur Oxycodone Screen     (NotDetected)  














  02/04/20 02/04/20 02/05/20 Range/Units





  17:15 Unknown 05:36 


 


Hgb     (11.4-16.0)  gm/dL


 


Potassium     (3.5-5.1)  mmol/L


 


Chloride    118 H  ()  mmol/L


 


Carbon Dioxide    14 L  (22-30)  mmol/L


 


BUN    64 H  (7-17)  mg/dL


 


Creatinine    5.57 H  (0.52-1.04)  mg/dL


 


Glucose    68 L  (74-99)  mg/dL


 


POC Glucose (mg/dL)  109 H    (75-99)  mg/dL


 


Calcium    7.7 L  (8.4-10.2)  mg/dL


 


Phosphorus     (2.5-4.5)  mg/dL


 


Magnesium     (1.6-2.3)  mg/dL


 


Alkaline Phosphatase    130 H  ()  U/L


 


Total Protein    5.7 L  (6.3-8.2)  g/dL


 


Albumin    2.9 L  (3.5-5.0)  g/dL


 


Urine Appearance   Cloudy H   (Clear)  


 


Urine Protein   2+ H   (Negative)  


 


Urine Glucose (UA)   Trace H   (Negative)  


 


Urine Blood   Trace H   (Negative)  


 


Ur Leukocyte Esterase   Large H   (Negative)  


 


Urine WBC   6 H   (0-5)  /hpf


 


Ur Squamous Epith Cells   5 H   (0-4)  /hpf


 


Urine Bacteria   Occasional H   (None)  /hpf


 


Hyaline Casts   3 H   (0-2)  /lpf


 


Urine Mucus   Rare H   (None)  /hpf


 


Ur Oxycodone Screen   Detected H   (NotDetected)  














  02/05/20 Range/Units





  06:02 


 


Hgb   (11.4-16.0)  gm/dL


 


Potassium   (3.5-5.1)  mmol/L


 


Chloride   ()  mmol/L


 


Carbon Dioxide   (22-30)  mmol/L


 


BUN   (7-17)  mg/dL


 


Creatinine   (0.52-1.04)  mg/dL


 


Glucose   (74-99)  mg/dL


 


POC Glucose (mg/dL)  71 L  (75-99)  mg/dL


 


Calcium   (8.4-10.2)  mg/dL


 


Phosphorus   (2.5-4.5)  mg/dL


 


Magnesium   (1.6-2.3)  mg/dL


 


Alkaline Phosphatase   ()  U/L


 


Total Protein   (6.3-8.2)  g/dL


 


Albumin   (3.5-5.0)  g/dL


 


Urine Appearance   (Clear)  


 


Urine Protein   (Negative)  


 


Urine Glucose (UA)   (Negative)  


 


Urine Blood   (Negative)  


 


Ur Leukocyte Esterase   (Negative)  


 


Urine WBC   (0-5)  /hpf


 


Ur Squamous Epith Cells   (0-4)  /hpf


 


Urine Bacteria   (None)  /hpf


 


Hyaline Casts   (0-2)  /lpf


 


Urine Mucus   (None)  /hpf


 


Ur Oxycodone Screen   (NotDetected)  














Thrombosis Risk Factor Assmnt





- Choose All That Apply


Each Factor Represents 1 point: Obesity (BMI >25)


Thrombosis Risk Factor Assessment Total Risk Factor Score: 1


Thrombosis Risk Factor Assessment Level: Low Risk

## 2020-02-05 NOTE — P.NPCON
History of Present Illness





- Reason for Consult


acute renal failure, chronic renal failure





- History of Present Illness





Reason for consultation: Acute kidney injury on chronic kidney disease





History of present illness:


Patient is a 74-year-old female seen in renal consultation for acute kidney 

injury on chronic kidney disease.  Patient has chronic kidney disease stage IV 

secondary to diabetic kidney disease with baseline creatinine near 2.2.  Patient

states her blood sugar has been running low recently and she's been having 

intermittent episodes of vomiting.  Patient's his oral intake has been poor.  

Additionally she was taking lisinopril as well as Lasix at home.  Patient 

received 1.5 L bolus of normal saline in the ER and is currently maintained on 

normal saline at 1 25 mL an hour.  She does not have a Rashid catheter.  She has 

been voiding.  No diarrhea.  No evidence of hypotension.  Her blood pressures 

actually on the higher side.  She denies family history of renal disease.  

Patient's creatinine on admission was 6.06 and is 5.57 today.  She is also noted

to be acidotic and a bicarb level today is 14.  She denies use of nonsteroidals.

 No fever or chills.  No chest pain or shortness of breath.  Blood sugar this 

admission has been stable.





Vital signs are stable.


General: The patient appeared well nourished and normally developed. 


HEENT: Head exam is unremarkable. Neck is without jugular venous distension.


LUNGS: Lungs are clear to auscultation and percussion. Breath sounds decreased.


HEART: Rate and Rhythm are regular. First and second heart sounds normal. No 

murmurs, rubs or gallops. 


ABDOMEN: Abdominal exam reveals normal bowel sounds. Non-tender and non-

distended. No evidence of peritonitis.


EXTREMITITES: Trace edema.





Past Medical History


Past Medical History: Cancer, Diabetes Mellitus, Hyperlipidemia, Hypertension, 

Renal Disease


Additional Past Medical History / Comment(s): uterine ca


History of Any Multi-Drug Resistant Organisms: None Reported


Past Surgical History: Bariatric Surgery, Cholecystectomy, Hysterectomy, Joint 

Replacement


Additional Past Surgical History / Comment(s): rufina knee,rt foot,


Past Psychological History: Depression


Smoking Status: Former smoker


Past Alcohol Use History: None Reported


Past Drug Use History: None Reported





- Past Family History


  ** Mother


Family Medical History: Diabetes Mellitus, Hypertension





  ** Father


Family Medical History: Diabetes Mellitus, Hypertension





  ** Sister(s)


Family Medical History: Diabetes Mellitus





Medications and Allergies


                                Home Medications











 Medication  Instructions  Recorded  Confirmed  Type


 


Calcitriol 0.5 mcg PO LARIOS 02/04/20 02/04/20 History


 


Cholecalciferol [Vitamin D3 (25 2,000 unit PO HS 02/04/20 02/04/20 History





Mcg = 1000 Iu)]    


 


Citalopram Hydrobromide [CeleXA] 40 mg PO DAILY 02/04/20 02/04/20 History


 


Cyanocobalamin (Vitamin B-12) 1,000 mcg PO HS 02/04/20 02/04/20 History





[Vitamin B-12]    


 


Diltiazem HCl 120 mg PO BID 02/04/20 02/04/20 History


 


Furosemide [Lasix] 20 mg PO DAILY 02/04/20 02/04/20 History


 


Levothyroxine Sodium [Synthroid] 112 mcg PO DAILY 02/04/20 02/04/20 History


 


Lisinopril [Zestril] 5 mg PO DAILY 02/04/20 02/04/20 History


 


Multivitamin [Multivitamins Adult 1 tab PO HS 02/04/20 02/04/20 History





Gummies]    


 


Rosuvastatin Calcium [Crestor] 10 mg PO DAILY 02/04/20 02/04/20 History


 


glipiZIDE [Glucotrol] 10 mg PO AC-BID 02/04/20 02/04/20 History


 


oxyCODONE-APAP 10-325MG [Percocet 1 tab PO TID PRN 02/04/20 02/04/20 History





 mg]    








                                    Allergies











Allergy/AdvReac Type Severity Reaction Status Date / Time


 


naproxen [From Naprosyn] Allergy  Dyspnea Verified 02/04/20 14:47














Physical Exam


Vitals: 


                                   Vital Signs











  Temp Pulse Pulse Resp BP BP Pulse Ox


 


 02/05/20 08:00  98.9 F   82  18   179/86  97


 


 02/05/20 04:00  98.0 F   80  17   182/77  96


 


 02/05/20 00:00  97.8 F   83  18   139/62  95


 


 02/04/20 21:08  97.8 F   80  18   179/82  100


 


 02/04/20 20:18  98.0 F  81   18  145/86   97


 


 02/04/20 20:08   81   18  145/86   97


 


 02/04/20 20:00    82  18   


 


 02/04/20 17:23   75   18  158/77   97


 


 02/04/20 14:43  98.0 F  88   22  182/82   97








                                Intake and Output











 02/04/20 02/05/20 02/05/20





 22:59 06:59 14:59


 


Intake Total 125  360


 


Output Total  800 300


 


Balance 125 -800 60


 


Intake:   


 


  Intake, IV Titration 125  





  Amount   


 


    Sodium Chloride 0.9% 1, 125  





    000 ml @ 125 mls/hr IV .   





    Q8H [facility] Rx#:[id number]   


 


  Oral   360


 


Output:   


 


  Urine  800 300


 


Other:   


 


  Voiding Method Toilet Toilet 


 


  # Voids  2 1


 


  # Bowel Movements   1


 


  Weight 111.13 kg 113.2 kg 














Results





- Lab Results


                             Most recent lab results











Calcium  7.7 mg/dL (8.4-10.2)  L  02/05/20  05:36    


 


Phosphorus  7.0 mg/dL (2.5-4.5)  H  02/04/20  15:00    


 


Magnesium  1.5 mg/dL (1.6-2.3)  L  02/04/20  15:00    














                                 02/04/20 15:00





                                 02/05/20 05:36





Assessment and Plan


Plan: 





Assessment:


1.  Acute kidney injury secondary to ATN secondary to intravascular volume 

depletion from vomiting and poor oral intake and further worsened with the use 

of diuretics and lisinopril.  Creatinine 6.06 on admission and is 5.57 today.  

Rule out urinary retention.


2.  Chronic kidney disease stage IV secondary to diabetic kidney disease with 

baseline creatinine near 2.2. 


3.  Hyperkalemia secondary to acute kidney injury, metabolic acidosis and use of

lisinopril. 


4.  Metabolic acidosis secondary to acute kidney injury.


5.  Insulin-dependent diabetes mellitus.  


6.  Hypertension with chronic kidney disease.


7.  Chronic kidney disease mineral bone disease maintained on calcitriol.





Plan:


Discontinue normal saline and start isotonic sodium bicarbonate drip to be run 

at 80 mL an hour.


Strict is and os.


Check bladder scan to make sure no underlying urinary retention.


Hold lisinopril and diuretics for now.


Add hydralazine 25 mg 3 times daily.  


Continue to monitor renal function and urine output.





Thank you for the consultation.  I will continue to follow the patient with you 

during her hospital stay.

## 2020-02-06 LAB
ANION GAP SERPL CALC-SCNC: 12 MMOL/L
BUN SERPL-SCNC: 61 MG/DL (ref 7–17)
CALCIUM SPEC-MCNC: 7.8 MG/DL (ref 8.4–10.2)
CHLORIDE SERPL-SCNC: 111 MMOL/L (ref 98–107)
CO2 SERPL-SCNC: 17 MMOL/L (ref 22–30)
GLUCOSE BLD-MCNC: 118 MG/DL (ref 75–99)
GLUCOSE BLD-MCNC: 216 MG/DL (ref 75–99)
GLUCOSE BLD-MCNC: 223 MG/DL (ref 75–99)
GLUCOSE BLD-MCNC: 226 MG/DL (ref 75–99)
GLUCOSE SERPL-MCNC: 106 MG/DL (ref 74–99)
MAGNESIUM SPEC-SCNC: 1.4 MG/DL (ref 1.6–2.3)
POTASSIUM SERPL-SCNC: 4.1 MMOL/L (ref 3.5–5.1)
SODIUM SERPL-SCNC: 140 MMOL/L (ref 137–145)

## 2020-02-06 RX ADMIN — INSULIN ASPART SCH UNIT: 100 INJECTION, SOLUTION INTRAVENOUS; SUBCUTANEOUS at 20:56

## 2020-02-06 RX ADMIN — MAGNESIUM SULFATE IN DEXTROSE SCH MLS/HR: 10 INJECTION, SOLUTION INTRAVENOUS at 11:31

## 2020-02-06 RX ADMIN — INSULIN ASPART SCH UNIT: 100 INJECTION, SOLUTION INTRAVENOUS; SUBCUTANEOUS at 12:32

## 2020-02-06 RX ADMIN — OXYCODONE HYDROCHLORIDE AND ACETAMINOPHEN PRN EACH: 10; 325 TABLET ORAL at 08:26

## 2020-02-06 RX ADMIN — IPRATROPIUM BROMIDE AND ALBUTEROL SULFATE SCH ML: .5; 3 SOLUTION RESPIRATORY (INHALATION) at 13:21

## 2020-02-06 RX ADMIN — POTASSIUM CHLORIDE SCH: 14.9 INJECTION, SOLUTION INTRAVENOUS at 20:02

## 2020-02-06 RX ADMIN — ONDANSETRON PRN MG: 2 INJECTION INTRAMUSCULAR; INTRAVENOUS at 07:03

## 2020-02-06 RX ADMIN — DILTIAZEM HYDROCHLORIDE SCH MG: 60 TABLET, FILM COATED ORAL at 20:48

## 2020-02-06 RX ADMIN — IPRATROPIUM BROMIDE AND ALBUTEROL SULFATE SCH ML: .5; 3 SOLUTION RESPIRATORY (INHALATION) at 08:31

## 2020-02-06 RX ADMIN — DILTIAZEM HYDROCHLORIDE SCH MG: 60 TABLET, FILM COATED ORAL at 08:27

## 2020-02-06 RX ADMIN — OXYCODONE HYDROCHLORIDE AND ACETAMINOPHEN PRN EACH: 10; 325 TABLET ORAL at 17:36

## 2020-02-06 RX ADMIN — MAGNESIUM SULFATE IN DEXTROSE SCH MLS/HR: 10 INJECTION, SOLUTION INTRAVENOUS at 10:03

## 2020-02-06 RX ADMIN — INSULIN ASPART SCH: 100 INJECTION, SOLUTION INTRAVENOUS; SUBCUTANEOUS at 06:22

## 2020-02-06 RX ADMIN — BISACODYL PRN MG: 5 TABLET, COATED ORAL at 20:48

## 2020-02-06 RX ADMIN — ATORVASTATIN CALCIUM SCH MG: 20 TABLET, FILM COATED ORAL at 08:28

## 2020-02-06 RX ADMIN — MAGNESIUM SULFATE IN DEXTROSE SCH MLS/HR: 10 INJECTION, SOLUTION INTRAVENOUS at 12:32

## 2020-02-06 RX ADMIN — IPRATROPIUM BROMIDE AND ALBUTEROL SULFATE SCH ML: .5; 3 SOLUTION RESPIRATORY (INHALATION) at 20:29

## 2020-02-06 RX ADMIN — OXYCODONE HYDROCHLORIDE AND ACETAMINOPHEN PRN EACH: 10; 325 TABLET ORAL at 22:56

## 2020-02-06 RX ADMIN — INSULIN ASPART SCH UNIT: 100 INJECTION, SOLUTION INTRAVENOUS; SUBCUTANEOUS at 17:31

## 2020-02-06 RX ADMIN — CITALOPRAM HYDROBROMIDE SCH MG: 20 TABLET ORAL at 08:27

## 2020-02-06 RX ADMIN — LEVOTHYROXINE SODIUM SCH MCG: 0.11 TABLET ORAL at 07:02

## 2020-02-06 RX ADMIN — ENOXAPARIN SODIUM SCH MG: 30 INJECTION SUBCUTANEOUS at 20:48

## 2020-02-06 RX ADMIN — CYANOCOBALAMIN TAB 500 MCG SCH MCG: 500 TAB at 20:47

## 2020-02-06 NOTE — US
EXAMINATION TYPE: US carotid duplex BILAT

 

DATE OF EXAM: 2/6/2020

 

COMPARISON: NONE

 

CLINICAL HISTORY: Intermittent slurred speech. Intermittent slurred speech, confusion 

 

EXAM MEASUREMENTS: 

 

RIGHT:  Peak Systolic Velocity (PSV) cm/sec

----- Right CCA:  100.3

----- Right ICA:  109.4     

----- Right ECA:  121.0   

ICA/CCA ratio:  1.1    

 

RIGHT:  End Diastole cm/sec

----- Right CCA:  16.8   

----- Right ICA:  29.9      

----- Right ECA:  12.8     

 

LEFT:  Peak Systolic Velocity (PSV) cm/sec

----- Left CCA:  111.2  

----- Left ICA:  111.3   

----- Left ECA:  101.6  

ICA/CCA ratio:  1.0  

 

LEFT:  End Diastole cm/sec

----- Left CCA:  19.3  

----- Left ICA:  37.0   

----- Left ECA:  9.5 

 

VERTEBRALS (direction of flow):

Right Vertebral: Antegrade

Left Vertebral: Antegrade

 

Mild plaque bilateral bifurcations. No evidence of significant stenosis 

 

 

 

IMPRESSION:  Mild degree of grayscale atheromatous plaquing with no sonographically evident hemodynam
ically significant stenosis within either visualized carotid arterial system.   

 

 

Criteria for Assigning % of Stenosis / Diameter reduction

(Estimation based on the indirect measurements of the internal carotid artery velocities (ICA PSV).

1.  Normal (no stenosis)=ICA PSV < 125 cm/s: ratio < 2.0: ICA EDV<40 cm/s.

2. Less than 50% stenosis=ICA PSV < 125 cm/s: ratio < 2.0: ICA EDV<40 cm/s.

3.  50 to 69% stenosis=ICA PSV of 125 to 230 cm/s: ration 2.0 ? 4.0: ICA EDV  cm/s.

4.  Greater than 70% stenosis to near occlusion= ICA PSV > 230 cm/s: ratio > 4.0: ICA EDV > 100 cm/s.
 

5.  Near occlusion= ICA PSV velocities may be low or undetectable: variable ratio and ICA EDV.

6.  Total occlusion=unable to detect flow.

## 2020-02-06 NOTE — CDI
Documentation Clarification Form



Date: 02/06/2020 12:50:18 PM

From: Randee Ashley RN CCDS 

Phone: [phone]

MRN: [id number]

Admit Date: 02/04/2020 06:04:00 PM

Patient Name: Brenda Lynn

Visit Number: [id number]

Discharge Date:  





ATTENTION: The Clinical Documentation Specialists (CDI) and [facility] Coding Staff 
appreciate your assistance in clarifying documentation. Please respond to the 
clarification below the line at the bottom and electronically sign. The CDI & 
[facility] Coding staff will review the response and follow-up if needed. Please note: 
Queries are made part of the Legal Health Record. If you have any questions, 
please contact the author of this message via ITS.



Dr. Rogelio Valle



Altered Mental Status was documented in the ED Notes 2/4



History/Risk Factors: 74-year-old female presents to the ED via EMS from Dr. Hardy office for being incoherent. Medical history DM 2; Depression; 
Anxiety; Stage IV kidney disease. 

Patient was admitted in Acute renal failure Cr 6.06 baseline is 2.2 per the H&P 
2/5

Clinical Indicators: 

Order for CT of Brain 2/4  Order CT of brain without con stat, Order set ED 
Altered Mental Status

Order for US Carotid duplex Bilateral Clinical history Intermittent slurred 
speech, confusion.

Labs: 2/4 K 5.7; Cl 113; Carbon Dioxide 12; Bun 70, Cr 6.06; glucose 200; Phos 
7.0; Mag 1.5; Alk Phos 162; Bnp 119; UA Leukocyte esterase large WBC 6; 
Toxicology Screen Oxycodone Detected. 

2/6 Us Carotid Greater than 70% stenosis to near occlusion = ICA

2/4 CXR Some atelectasis right lung base with elevated right diaphragm

2/4 CT Brain  Mild atrophy. No acute intracranial abnormality 

Treatment: 2/4 0.9ns 1.5L bolus followed by 100cc/hr d/c 2/5, 2/4 Kayexalate po 
x 1, 2/4 Percocet 10/325 1 each po TID PRN Pain 



In your professional opinion, please clarify the etiology of the Altered Mental 
Status, if known.



* Metabolic Encephalopathy (specify Type and Underlying Medical Illness) 
  ___________

* Other condition (please specify) _________________

* Unable to determine



(Last Revision: April 2018)



Acute metabolic encephalopathy secondary to hypoglycemia

___________________________________________________________________________



MTDD

## 2020-02-06 NOTE — P.PN
Progress Note - Text


Progress Note Date: 02/06/20





Chief Complaint: Tired





History of presenting complaint:


This is a pleasant 74 a patient of Dr. Ashraf.  Chronic stable medical 

conditions include hypertension, hyperlipidemia, depression, anxiety, 

osteoarthritis.  Patient about a week ago felt that she cannot really speak felt

out of sorts.  EMS was called out.  Follow Accu-Cheks to be 43.  Received 

glucose and felt better.  She did not go to the hospital.  Since then she been 

having nausea vomiting and predominantly throwing up.  She went to see a pain 

specialist Dr. Roy and she was again incoherent of that.  Decided to send 

her to the ER.  No abdominal pain.  Has been having some chills.  In the ER 

found to be in acute renal failure.  Patient's baseline creatinine per 

nephrology rundown 2.2.


Admitted with-episodes of hypoglycemia, symptomatic, acute kidney injury likely 

ATN.  Oral hypoglycemics were discontinued.


Today-feeling a bit better.  Tolerating a diet.  No more low sugars.





Review of systems: Was done for constitutional, cardiovascular, GI, pulmonary. 

relevant finding as above





Active Medications





Albuterol/Ipratropium (Duoneb 0.5 Mg-3 Mg/3 Ml Soln)  3 ml INHALATION RT-TID [facility]


   Last Admin: 02/06/20 20:29 Dose:  3 ml


   Documented by: 


Atorvastatin Calcium (Lipitor)  20 mg PO DAILY [facility]


   Last Admin: 02/06/20 08:28 Dose:  20 mg


   Documented by: 


Bisacodyl (Dulcolax)  10 mg PO DAILY PRN


   PRN Reason: Constipation


   Last Admin: 02/06/20 20:48 Dose:  10 mg


   Documented by: 


Calcitriol (Rocaltrol)  0.5 mcg PO [facility]


Citalopram Hydrobromide (Celexa)  40 mg PO DAILY [facility]


   Last Admin: 02/06/20 08:27 Dose:  40 mg


   Documented by: 


Cyanocobalamin (Vitamin B-12)  1,000 mcg PO [facility]


   Last Admin: 02/06/20 20:47 Dose:  1,000 mcg


   Documented by: 


Diltiazem HCl (Cardizem Oral)  120 mg PO BID [facility]


   Last Admin: 02/06/20 20:48 Dose:  120 mg


   Documented by: 


Enoxaparin Sodium (Lovenox)  30 mg SQ [facility]


   Last Admin: 02/06/20 20:48 Dose:  30 mg


   Documented by: 


Hydralazine HCl (Apresoline)  50 mg PO TID [facility]


   Last Admin: 02/06/20 20:48 Dose:  50 mg


   Documented by: 


Sodium Bicarbonate 150 ml/ (Dextrose/Water)  1,150 mls @ 80 mls/hr IV .[id number] 

[facility]


   Last Admin: 02/06/20 20:02 Dose:  Not Given


   Documented by: 


Insulin Aspart (Novolog)  0 unit SQ ACHS [facility]; Protocol


   Last Admin: 02/06/20 20:56 Dose:  4 unit


   Documented by: 


Levothyroxine Sodium (Synthroid)  112 mcg PO DAILY@0630 [facility]


   Last Admin: 02/06/20 07:02 Dose:  112 mcg


   Documented by: 


Ondansetron HCl (Zofran)  4 mg IVP Q6HR PRN


   PRN Reason: Nausea And Vomiting


   Last Admin: 02/06/20 07:03 Dose:  4 mg


   Documented by: 


Oxycodone/Acetaminophen (Percocet )  1 each PO TID PRN


   PRN Reason: Pain


   Last Admin: 02/06/20 17:36 Dose:  1 each


   Documented by: 








Physical examination:


VITAL SIGNS: 97.9-83-/91-95% on room air.  Repeat blood pressure 142/86


GENERAL: Laying in bed, awake, but tired


EYES: Pupils equal.  Conjunctiva normal.


HEENT: External appearance of nose and ears normal, oral cavity grossly normal.


NECK: JVD not raised; masses not palpable.


HEART: First and second heart sounds are normal;  no edema.  


LUNGS: Respiratory rate normal; clear to auscultation.  


ABDOMEN: Soft,  nontender, liver spleen not palpable, no masses palpable.  


PSYCH: Alert and oriented x3;  mood  and affect ariel


MUSCULOSKELETAL: Evidence of OA especially in the hands l.  


NEUROLOGICAL: Cranial nerves grossly intact; no facial asymmetry,   power and 

sensation grossly intact. 








INVESTIGATIONS, reviewed in the clinical context:


Potassium 4.1 bun 61 creatinine 5.37 magnesium 1.4


[id number]





Previous testing


White count 6.5 hemoglobin 11.2 platelets 293 pressure 5.7 bun 70 creatinine 

6.06


Baseline creatinine supposed to be 2.2


UA positive for leukoesterase, WBC


Urine drug screen positive for oxycodone


EKG tracing personally reviewed by me shows-sinus rhythm


Chest x-ray film personally reviewed by me-elevated right diaphragm, lung fields

are clear


Computed tomography scan of the brain-lung fields clear





Assessment:


-Diabetes mellitus type 2 uncontrolled with episodes of hypoglycemia, 

symptomatic, improving


-Acute kidney injury likely ATN with a contribution from patient being on ACE 

inhibitor Lasix, slow to improve


-Chronic kidney disease stage IV from diabetic nephropathy


-Hyperlipidemia


-Essential hypertension, uncontrolled, slow to respond


-Depression not otherwise


-Primary osteoarthritis


-Morbid obesity BMI 44.2


-Hyperkalemia secondary to acute kidney injury and patient be on ACE inhibitor


-Hypomagnesemia


-Hyperphosphatemia





Plan:


Continue with IV fluids.  Accu-Cheks of,..  The patient on glipizide on a 

smaller dose..  Keep the patient on a bicarbonate drip.  Hydralazine increased 

to 50 mg 3 times a day.  Discussed with the patient.  Follow electrolytes.

## 2020-02-06 NOTE — P.PN
Subjective





Patient is seen in follow-up for acute kidney injury on chronic kidney disease. 

No significant improvement in renal function.  She has been voiding.  No 

vomiting or diarrhea.





Vital signs are stable.


General: The patient appeared well nourished and normally developed. 


HEENT: Head exam is unremarkable. Neck is without jugular venous distension.


LUNGS: Lungs are clear to auscultation and percussion. Breath sounds decreased.


HEART: Rate and Rhythm are regular. First and second heart sounds normal. No 

murmurs, rubs or gallops. 


ABDOMEN: Abdominal exam reveals normal bowel sounds. Non-tender and non-

distended. No evidence of peritonitis.


EXTREMITITES: No clubbing, cyanosis, or edema.





Objective





- Vital Signs


Vital signs: 


                                   Vital Signs











Temp  97.9 F   02/06/20 08:00


 


Pulse  86   02/06/20 08:46


 


Resp  19   02/06/20 08:00


 


BP  202/91   02/06/20 08:00


 


Pulse Ox  95   02/06/20 08:36








                                 Intake & Output











 02/05/20 02/06/20 02/06/20





 18:59 06:59 18:59


 


Intake Total 720 600 240


 


Output Total 300 1100 


 


Balance 420 -500 240


 


Weight  111 kg 


 


Intake:   


 


  Intake, IV Titration  600 





  Amount   


 


    Dextrose 5% in Water 1,  600 





    000 ml @ 80 mls/hr IV .   





    [id number] LUCA with Sodium   





    Bicarb (1 Meq/ml) 150 ml   





    Rx#:[id number]   


 


  Oral 720  240


 


Output:   


 


  Urine 300 1100 


 


Other:   


 


  Voiding Method Toilet Toilet Toilet


 


  # Voids 1 1 


 


  # Bowel Movements 1  














- Labs


CBC & Chem 7: 


                                 02/04/20 15:00





                                 02/06/20 06:26


Labs: 


                  Abnormal Lab Results - Last 24 Hours (Table)











  02/05/20 02/05/20 02/05/20 Range/Units





  11:59 16:49 20:41 


 


Chloride     ()  mmol/L


 


Carbon Dioxide     (22-30)  mmol/L


 


BUN     (7-17)  mg/dL


 


Creatinine     (0.52-1.04)  mg/dL


 


Glucose     (74-99)  mg/dL


 


POC Glucose (mg/dL)  114 H  174 H  124 H  (75-99)  mg/dL


 


Calcium     (8.4-10.2)  mg/dL


 


Magnesium     (1.6-2.3)  mg/dL














  02/06/20 02/06/20 Range/Units





  05:53 06:26 


 


Chloride   111 H  ()  mmol/L


 


Carbon Dioxide   17 L  (22-30)  mmol/L


 


BUN   61 H  (7-17)  mg/dL


 


Creatinine   5.37 H  (0.52-1.04)  mg/dL


 


Glucose   106 H  (74-99)  mg/dL


 


POC Glucose (mg/dL)  118 H   (75-99)  mg/dL


 


Calcium   7.8 L  (8.4-10.2)  mg/dL


 


Magnesium   1.4 L  (1.6-2.3)  mg/dL














Assessment and Plan


Plan: 





Assessment:


1.  Acute kidney injury secondary to ATN secondary to intravascular volume 

depletion from vomiting and poor oral intake and further worsened with the use 

of diuretics and lisinopril.  Creatinine 6.06 on admission and is 5.37 today. 


2.  Chronic kidney disease stage IV secondary to diabetic kidney disease with 

baseline creatinine near 2.2. 


3.  Hyperkalemia secondary to acute kidney injury, metabolic acidosis and use of

lisinopril.  Resolved.


4.  Metabolic acidosis secondary to acute kidney injury.  Improving.


5.  Insulin-dependent diabetes mellitus.  


6.  Hypertension with chronic kidney disease.


7.  Chronic kidney disease mineral bone disease maintained on calcitriol.





Plan:


Maintain bicarb drip at 80 mL an hour.


Increase hydralazine to 50 mg 3 times daily.


Strict is and os.


Hold lisinopril and diuretics for now.


Continue to monitor renal function and urine output.


No urgent need for renal replacement therapy at this time.  Continue to assess 

on daily basis.

## 2020-02-07 LAB
ALBUMIN SERPL-MCNC: 2.9 G/DL (ref 3.5–5)
ALP SERPL-CCNC: 116 U/L (ref 38–126)
ALT SERPL-CCNC: 13 U/L (ref 4–34)
ANION GAP SERPL CALC-SCNC: 11 MMOL/L
AST SERPL-CCNC: 26 U/L (ref 14–36)
BUN SERPL-SCNC: 61 MG/DL (ref 7–17)
CALCIUM SPEC-MCNC: 7.7 MG/DL (ref 8.4–10.2)
CHLORIDE SERPL-SCNC: 105 MMOL/L (ref 98–107)
CO2 SERPL-SCNC: 21 MMOL/L (ref 22–30)
GLUCOSE BLD-MCNC: 122 MG/DL (ref 75–99)
GLUCOSE BLD-MCNC: 138 MG/DL (ref 75–99)
GLUCOSE BLD-MCNC: 155 MG/DL (ref 75–99)
GLUCOSE BLD-MCNC: 224 MG/DL (ref 75–99)
GLUCOSE SERPL-MCNC: 113 MG/DL (ref 74–99)
MAGNESIUM SPEC-SCNC: 2 MG/DL (ref 1.6–2.3)
POTASSIUM SERPL-SCNC: 4.4 MMOL/L (ref 3.5–5.1)
PROT SERPL-MCNC: 5.7 G/DL (ref 6.3–8.2)
SODIUM SERPL-SCNC: 137 MMOL/L (ref 137–145)

## 2020-02-07 RX ADMIN — CITALOPRAM HYDROBROMIDE SCH MG: 20 TABLET ORAL at 08:45

## 2020-02-07 RX ADMIN — INSULIN ASPART SCH: 100 INJECTION, SOLUTION INTRAVENOUS; SUBCUTANEOUS at 06:27

## 2020-02-07 RX ADMIN — ATORVASTATIN CALCIUM SCH MG: 20 TABLET, FILM COATED ORAL at 08:45

## 2020-02-07 RX ADMIN — DILTIAZEM HYDROCHLORIDE SCH MG: 60 TABLET, FILM COATED ORAL at 08:45

## 2020-02-07 RX ADMIN — INSULIN ASPART SCH UNIT: 100 INJECTION, SOLUTION INTRAVENOUS; SUBCUTANEOUS at 21:05

## 2020-02-07 RX ADMIN — OXYCODONE HYDROCHLORIDE AND ACETAMINOPHEN PRN EACH: 10; 325 TABLET ORAL at 08:49

## 2020-02-07 RX ADMIN — IPRATROPIUM BROMIDE AND ALBUTEROL SULFATE SCH: .5; 3 SOLUTION RESPIRATORY (INHALATION) at 08:49

## 2020-02-07 RX ADMIN — OXYCODONE HYDROCHLORIDE AND ACETAMINOPHEN PRN EACH: 10; 325 TABLET ORAL at 17:34

## 2020-02-07 RX ADMIN — POTASSIUM CHLORIDE SCH MLS/HR: 14.9 INJECTION, SOLUTION INTRAVENOUS at 06:26

## 2020-02-07 RX ADMIN — DILTIAZEM HYDROCHLORIDE SCH MG: 60 TABLET, FILM COATED ORAL at 20:51

## 2020-02-07 RX ADMIN — INSULIN ASPART SCH: 100 INJECTION, SOLUTION INTRAVENOUS; SUBCUTANEOUS at 17:33

## 2020-02-07 RX ADMIN — ENOXAPARIN SODIUM SCH MG: 30 INJECTION SUBCUTANEOUS at 20:52

## 2020-02-07 RX ADMIN — CYANOCOBALAMIN TAB 500 MCG SCH MCG: 500 TAB at 20:52

## 2020-02-07 RX ADMIN — IPRATROPIUM BROMIDE AND ALBUTEROL SULFATE SCH: .5; 3 SOLUTION RESPIRATORY (INHALATION) at 12:01

## 2020-02-07 RX ADMIN — IPRATROPIUM BROMIDE AND ALBUTEROL SULFATE SCH ML: .5; 3 SOLUTION RESPIRATORY (INHALATION) at 20:41

## 2020-02-07 RX ADMIN — LEVOTHYROXINE SODIUM SCH MCG: 0.11 TABLET ORAL at 06:27

## 2020-02-07 RX ADMIN — INSULIN ASPART SCH UNIT: 100 INJECTION, SOLUTION INTRAVENOUS; SUBCUTANEOUS at 13:21

## 2020-02-07 NOTE — P.PN
Progress Note - Text


Progress Note Date: 02/07/20





Chief Complaint: Tired





History of presenting complaint:


This is a pleasant 74 a patient of Dr. Ashraf.  Chronic stable medical 

conditions include hypertension, hyperlipidemia, depression, anxiety, 

osteoarthritis.  Patient about a week ago felt that she cannot really speak felt

out of sorts.  EMS was called out.  Follow Accu-Cheks to be 43.  Received 

glucose and felt better.  She did not go to the hospital.  Since then she been 

having nausea vomiting and predominantly throwing up.  She went to see a pain 

specialist Dr. Roy and she was again incoherent of that.  Decided to send 

her to the ER.  No abdominal pain.  Has been having some chills.  In the ER 

found to be in acute renal failure.  Patient's baseline creatinine per 

nephrology rundown 2.2.


Admitted with-episodes of hypoglycemia, symptomatic, acute kidney injury likely 

ATN.  Oral hypoglycemics were discontinued.


Today-still feels tired.  Had an episode of vomiting.  Some nausea.  Not much 

improvement renal function.  Laying bed.





Review of systems: Was done for constitutional, cardiovascular, GI, pulmonary. 

relevant finding as above





Active Medications





Albuterol/Ipratropium (Duoneb 0.5 Mg-3 Mg/3 Ml Soln)  3 ml INHALATION RT-TID [facility]


   Last Admin: 02/07/20 20:41 Dose:  3 ml


   Documented by: 


Atorvastatin Calcium (Lipitor)  20 mg PO DAILY [facility]


   Last Admin: 02/07/20 08:45 Dose:  20 mg


   Documented by: 


Bisacodyl (Dulcolax)  10 mg PO DAILY PRN


   PRN Reason: Constipation


   Last Admin: 02/06/20 20:48 Dose:  10 mg


   Documented by: 


Calcitriol (Rocaltrol)  0.5 mcg PO [facility]


Citalopram Hydrobromide (Celexa)  40 mg PO DAILY [facility]


   Last Admin: 02/07/20 08:45 Dose:  40 mg


   Documented by: 


Cyanocobalamin (Vitamin B-12)  1,000 mcg PO [facility]


   Last Admin: 02/06/20 20:47 Dose:  1,000 mcg


   Documented by: 


Diltiazem HCl (Cardizem Oral)  120 mg PO BID [facility]


   Last Admin: 02/07/20 08:45 Dose:  120 mg


   Documented by: 


Enoxaparin Sodium (Lovenox)  30 mg SQ [facility]


   Last Admin: 02/06/20 20:48 Dose:  30 mg


   Documented by: 


Hydralazine HCl (Apresoline)  50 mg PO TID [facility]


   Last Admin: 02/07/20 17:35 Dose:  50 mg


   Documented by: 


Insulin Aspart (Novolog)  0 unit SQ ACHS [facility]; Protocol


   Last Admin: 02/07/20 17:33 Dose:  Not Given


   Documented by: 


Levothyroxine Sodium (Synthroid)  112 mcg PO DAILY@0630 [facility]


   Last Admin: 02/07/20 06:27 Dose:  112 mcg


   Documented by: 


Ondansetron HCl (Zofran)  4 mg IVP Q6HR PRN


   PRN Reason: Nausea And Vomiting


   Last Admin: 02/06/20 07:03 Dose:  4 mg


   Documented by: 


Oxycodone/Acetaminophen (Percocet )  1 each PO TID PRN


   PRN Reason: Pain


   Last Admin: 02/07/20 17:34 Dose:  1 each


   Documented by: 











Physical examination:


VITAL SIGNS: 97.4-80-/74-97% on room air


GENERAL: Laying in bed, awake, but tired


EYES: Pupils equal.  Conjunctiva normal.


HEENT: External appearance of nose and ears normal, oral cavity grossly normal.


NECK: JVD not raised; masses not palpable.


HEART: First and second heart sounds are normal;  no edema.  


LUNGS: Respiratory rate normal; clear to auscultation.  


ABDOMEN: Soft,  nontender, liver spleen not palpable, no masses palpable.  


PSYCH: Alert and oriented x3;  mood  and affect ariel


MUSCULOSKELETAL: Evidence of OA especially in the hands l.  


NEUROLOGICAL: Cranial nerves grossly intact; no facial asymmetry,   power and 

sensation grossly intact. 








INVESTIGATIONS, reviewed in the clinical context:


Potassium 4.4 bun 61 creatinine 5.19


[id number]





Previous testing


White count 6.5 hemoglobin 11.2 platelets 293 pressure 5.7 bun 70 creatinine 

6.06


Baseline creatinine supposed to be 2.2


UA positive for leukoesterase, WBC


Urine drug screen positive for oxycodone


EKG tracing personally reviewed by me shows-sinus rhythm


Chest x-ray film personally reviewed by me-elevated right diaphragm, lung fields

are clear


Computed tomography scan of the brain-lung fields clear





Assessment:


-Diabetes mellitus type 2 uncontrolled with episodes of hypoglycemia, 

symptomatic, improving


-Acute kidney injury likely ATN with a contribution from patient being on ACE 

inhibitor Lasix, slow to improve


-Chronic kidney disease stage IV from diabetic nephropathy, symptomatic


-Hyperlipidemia


-Essential hypertension, uncontrolled, slow to respond


-Depression not otherwise


-Primary osteoarthritis


-Morbid obesity BMI 44.2


-Hyperkalemia secondary to acute kidney injury and patient be on ACE inhibitor


-Hypomagnesemia


-Hyperphosphatemia





Plan:


Patient renal function is not improving.  Patient will need renal replacement 

therapy.  Earlier nephrology had spoken with the patient.  She was on 24 hours. 

She also been vomiting today.  Repeat labs in the morning.

## 2020-02-07 NOTE — P.PN
Subjective





Patient is seen in follow-up for acute kidney injury on chronic kidney disease. 

Renal function only mildly improved.  Creatinine 5.19 today.  She has been 

voiding.  No vomiting or diarrhea.  Admits to edema in her lower extremities.





Vital signs are stable.


General: The patient appeared well nourished and normally developed. 


HEENT: Head exam is unremarkable. Neck is without jugular venous distension.


LUNGS: Lungs are clear to auscultation and percussion. Breath sounds decreased.


HEART: Rate and Rhythm are regular. First and second heart sounds normal. No 

murmurs, rubs or gallops. 


ABDOMEN: Abdominal exam reveals normal bowel sounds. Non-tender and non-

distended. No evidence of peritonitis.


EXTREMITITES: 1+ edema.





Objective





- Vital Signs


Vital signs: 


                                   Vital Signs











Temp  97.4 F L  02/07/20 03:08


 


Pulse  86   02/07/20 08:00


 


Resp  16   02/07/20 08:00


 


BP  164/86   02/07/20 08:00


 


Pulse Ox  94 L  02/07/20 08:00








                                 Intake & Output











 02/06/20 02/07/20 02/07/20





 18:59 06:59 18:59


 


Intake Total 360  120


 


Output Total 900 1150 600


 


Balance -540 -1150 -480


 


Weight  117.6 kg 


 


Intake:   


 


  Oral 360  120


 


Output:   


 


  Urine 900 1150 600


 


Other:   


 


  Voiding Method Toilet Toilet Toilet


 


  # Voids 1 1 














- Labs


CBC & Chem 7: 


                                 02/04/20 15:00





                                 02/07/20 06:11


Labs: 


                  Abnormal Lab Results - Last 24 Hours (Table)











  02/06/20 02/06/20 02/06/20 Range/Units





  12:00 16:33 20:07 


 


Carbon Dioxide     (22-30)  mmol/L


 


BUN     (7-17)  mg/dL


 


Creatinine     (0.52-1.04)  mg/dL


 


Glucose     (74-99)  mg/dL


 


POC Glucose (mg/dL)  226 H  216 H  223 H  (75-99)  mg/dL


 


Calcium     (8.4-10.2)  mg/dL


 


Total Protein     (6.3-8.2)  g/dL


 


Albumin     (3.5-5.0)  g/dL














  02/07/20 02/07/20 Range/Units





  06:04 06:11 


 


Carbon Dioxide   21 L  (22-30)  mmol/L


 


BUN   61 H  (7-17)  mg/dL


 


Creatinine   5.19 H  (0.52-1.04)  mg/dL


 


Glucose   113 H  (74-99)  mg/dL


 


POC Glucose (mg/dL)  138 H   (75-99)  mg/dL


 


Calcium   7.7 L  (8.4-10.2)  mg/dL


 


Total Protein   5.7 L  (6.3-8.2)  g/dL


 


Albumin   2.9 L  (3.5-5.0)  g/dL














Assessment and Plan


Plan: 





Assessment:


1.  Acute kidney injury secondary to ATN secondary to intravascular volume 

depletion from vomiting and poor oral intake and further worsened with the use 

of diuretics and lisinopril.  Creatinine 6.06 on admission and is 5.19 today. 


2.  Chronic kidney disease stage IV secondary to diabetic kidney disease with 

baseline creatinine near 2.2. 


3.  Hyperkalemia secondary to acute kidney injury, metabolic acidosis and use of

lisinopril.  Resolved.


4.  Metabolic acidosis secondary to acute kidney injury.  Improving.


5.  Insulin-dependent diabetes mellitus.  


6.  Hypertension with chronic kidney disease.


7.  Chronic kidney disease mineral bone disease maintained on calcitriol.


8.  Lower extremity edema.





Plan:


Discontinue bicarbonate drip.


Lasix 80 mg IV once today.


Strict is and os.


Continue to hold lisinopril.


Continue to monitor renal function and urine output.


I discussed with the patient in detail regarding the potential need to start 

renal replacement therapy this admission as her renal function hasn't improved 

much despite receiving IV fluids.  Patient states she wants to wait another 24 

hours to see if her renal function improves further and will then decide.

## 2020-02-08 LAB
ANION GAP SERPL CALC-SCNC: 10 MMOL/L
BUN SERPL-SCNC: 61 MG/DL (ref 7–17)
CALCIUM SPEC-MCNC: 7.8 MG/DL (ref 8.4–10.2)
CHLORIDE SERPL-SCNC: 100 MMOL/L (ref 98–107)
CO2 SERPL-SCNC: 24 MMOL/L (ref 22–30)
GLUCOSE BLD-MCNC: 124 MG/DL (ref 75–99)
GLUCOSE BLD-MCNC: 164 MG/DL (ref 75–99)
GLUCOSE BLD-MCNC: 179 MG/DL (ref 75–99)
GLUCOSE BLD-MCNC: 220 MG/DL (ref 75–99)
GLUCOSE SERPL-MCNC: 110 MG/DL (ref 74–99)
MAGNESIUM SPEC-SCNC: 1.9 MG/DL (ref 1.6–2.3)
POTASSIUM SERPL-SCNC: 4.3 MMOL/L (ref 3.5–5.1)
SODIUM SERPL-SCNC: 134 MMOL/L (ref 137–145)

## 2020-02-08 RX ADMIN — ATORVASTATIN CALCIUM SCH MG: 20 TABLET, FILM COATED ORAL at 08:29

## 2020-02-08 RX ADMIN — OXYCODONE HYDROCHLORIDE AND ACETAMINOPHEN PRN EACH: 10; 325 TABLET ORAL at 18:10

## 2020-02-08 RX ADMIN — LEVOTHYROXINE SODIUM SCH MCG: 0.11 TABLET ORAL at 06:04

## 2020-02-08 RX ADMIN — FUROSEMIDE SCH MG: 10 INJECTION, SOLUTION INTRAMUSCULAR; INTRAVENOUS at 12:25

## 2020-02-08 RX ADMIN — INSULIN ASPART SCH: 100 INJECTION, SOLUTION INTRAVENOUS; SUBCUTANEOUS at 06:02

## 2020-02-08 RX ADMIN — IPRATROPIUM BROMIDE AND ALBUTEROL SULFATE SCH ML: .5; 3 SOLUTION RESPIRATORY (INHALATION) at 08:41

## 2020-02-08 RX ADMIN — IPRATROPIUM BROMIDE AND ALBUTEROL SULFATE SCH ML: .5; 3 SOLUTION RESPIRATORY (INHALATION) at 13:45

## 2020-02-08 RX ADMIN — DILTIAZEM HYDROCHLORIDE SCH MG: 60 TABLET, FILM COATED ORAL at 19:50

## 2020-02-08 RX ADMIN — BISACODYL PRN MG: 5 TABLET, COATED ORAL at 08:29

## 2020-02-08 RX ADMIN — DILTIAZEM HYDROCHLORIDE SCH MG: 60 TABLET, FILM COATED ORAL at 08:28

## 2020-02-08 RX ADMIN — IPRATROPIUM BROMIDE AND ALBUTEROL SULFATE SCH ML: .5; 3 SOLUTION RESPIRATORY (INHALATION) at 20:46

## 2020-02-08 RX ADMIN — OXYCODONE HYDROCHLORIDE AND ACETAMINOPHEN PRN EACH: 10; 325 TABLET ORAL at 08:28

## 2020-02-08 RX ADMIN — INSULIN ASPART SCH UNIT: 100 INJECTION, SOLUTION INTRAVENOUS; SUBCUTANEOUS at 12:26

## 2020-02-08 RX ADMIN — INSULIN ASPART SCH UNIT: 100 INJECTION, SOLUTION INTRAVENOUS; SUBCUTANEOUS at 18:10

## 2020-02-08 RX ADMIN — INSULIN ASPART SCH UNIT: 100 INJECTION, SOLUTION INTRAVENOUS; SUBCUTANEOUS at 21:19

## 2020-02-08 RX ADMIN — FUROSEMIDE SCH MG: 10 INJECTION, SOLUTION INTRAMUSCULAR; INTRAVENOUS at 19:51

## 2020-02-08 RX ADMIN — ONDANSETRON PRN MG: 2 INJECTION INTRAMUSCULAR; INTRAVENOUS at 20:02

## 2020-02-08 RX ADMIN — ONDANSETRON PRN MG: 2 INJECTION INTRAMUSCULAR; INTRAVENOUS at 08:28

## 2020-02-08 RX ADMIN — CYANOCOBALAMIN TAB 500 MCG SCH MCG: 500 TAB at 19:50

## 2020-02-08 RX ADMIN — OXYCODONE HYDROCHLORIDE AND ACETAMINOPHEN PRN EACH: 10; 325 TABLET ORAL at 01:19

## 2020-02-08 RX ADMIN — CITALOPRAM HYDROBROMIDE SCH MG: 20 TABLET ORAL at 08:29

## 2020-02-08 RX ADMIN — ENOXAPARIN SODIUM SCH MG: 30 INJECTION SUBCUTANEOUS at 19:50

## 2020-02-08 NOTE — P.PN
Subjective


Progress Note Date: 02/08/20


Follow-up for acute kidney injury.  Good urine output.  No nausea vomiting 

diarrhea no asterixis.  She has lower extremity edema.








Objective





- Vital Signs


Vital signs: 


                                   Vital Signs











Temp  98.0 F   02/08/20 04:00


 


Pulse  70   02/08/20 08:56


 


Resp  16   02/08/20 11:17


 


BP  165/72   02/08/20 08:00


 


Pulse Ox  95   02/08/20 08:00








                                 Intake & Output











 02/07/20 02/08/20 02/08/20





 18:59 06:59 18:59


 


Intake Total 600 480 240


 


Output Total 1826 1800 


 


Balance -1226 -1320 240


 


Weight  118.2 kg 


 


Intake:   


 


  Intake, IV Titration  480 





  Amount   


 


    Dextrose 5% in Water 1,  480 





    000 ml @ 80 mls/hr IV .   





    [id number] LUCA with Sodium   





    Bicarb (1 Meq/ml) 150 ml   





    Rx#:[id number]   


 


  Oral 600  240


 


Output:   


 


  Urine 1826 1800 


 


Other:   


 


  Voiding Method Toilet  Toilet


 


  # Voids 1  














- Exam


No acute distress


S1-S2 heard


Lungs clear


Abdomen soft


Edema








- Labs


CBC & Chem 7: 


                                 02/04/20 15:00





                                 02/08/20 05:51


Labs: 


                  Abnormal Lab Results - Last 24 Hours (Table)











  02/07/20 02/07/20 02/07/20 Range/Units





  12:30 17:06 20:10 


 


Sodium     (137-145)  mmol/L


 


BUN     (7-17)  mg/dL


 


Creatinine     (0.52-1.04)  mg/dL


 


Glucose     (74-99)  mg/dL


 


POC Glucose (mg/dL)  224 H  122 H  155 H  (75-99)  mg/dL


 


Calcium     (8.4-10.2)  mg/dL














  02/08/20 02/08/20 Range/Units





  05:51 06:00 


 


Sodium  134 L   (137-145)  mmol/L


 


BUN  61 H   (7-17)  mg/dL


 


Creatinine  5.23 H   (0.52-1.04)  mg/dL


 


Glucose  110 H   (74-99)  mg/dL


 


POC Glucose (mg/dL)   124 H  (75-99)  mg/dL


 


Calcium  7.8 L   (8.4-10.2)  mg/dL














Assessment and Plan


Assessment: 


#1 nonoliguric acute kidney injury secondary to hemodynamic ATN.  Creatinine 

stable.


#2 chronic kidney disease stage IV secondary to diabetic nephropathy with a 

baseline creatinine of 2.2.


#3 hypertension with chronic kidney disease


#4 volume overload with 11 pound weight gain since admission


#5 insulin-dependent diabetes


#6 hypervolemic hyponatremia





Plan: 


#1 start diuretics Lasix 60 mg IV twice a day


#2 monitor renal function closely


#3 no acute indication for renal replacement therapy today.  Repeat blood work 

tomorrow, Plan hemodialysis inpatient versus outpatient based on the labs and 

symptoms.

## 2020-02-08 NOTE — P.PN
Progress Note - Text


Progress Note Date: 02/08/20





Chief Complaint: Tired





History of presenting complaint:


This is a pleasant 74 a patient of Dr. Ashraf.  Chronic stable medical 

conditions include hypertension, hyperlipidemia, depression, anxiety, 

osteoarthritis.  Patient about a week ago felt that she cannot really speak felt

out of sorts.  EMS was called out.  Follow Accu-Cheks to be 43.  Received 

glucose and felt better.  She did not go to the hospital.  Since then she been 

having nausea vomiting and predominantly throwing up.  She went to see a pain 

specialist Dr. Roy and she was again incoherent of that.  Decided to send 

her to the ER.  No abdominal pain.  Has been having some chills.  In the ER 

found to be in acute renal failure.  Patient's baseline creatinine per 

nephrology rundown 2.2.


Admitted with-episodes of hypoglycemia, symptomatic, acute kidney injury likely 

ATN.  Oral hypoglycemics were discontinued.


Today-patient can use to feel weak tired rundown.  Patient's friend with whom 

she lives is also present.  Nausea present.





Review of systems: Was done for constitutional, cardiovascular, GI, pulmonary. 

relevant finding as above





Active Medications





Albuterol/Ipratropium (Duoneb 0.5 Mg-3 Mg/3 Ml Soln)  3 ml INHALATION RT-TID [facility]


   Last Admin: 02/08/20 20:46 Dose:  3 ml


   Documented by: 


Atorvastatin Calcium (Lipitor)  20 mg PO DAILY [facility]


   Last Admin: 02/08/20 08:29 Dose:  20 mg


   Documented by: 


Bisacodyl (Dulcolax)  10 mg PO DAILY PRN


   PRN Reason: Constipation


   Last Admin: 02/08/20 08:29 Dose:  10 mg


   Documented by: 


Calcitriol (Rocaltrol)  0.5 mcg PO LARIOS [facility]


Citalopram Hydrobromide (Celexa)  40 mg PO DAILY [facility]


   Last Admin: 02/08/20 08:29 Dose:  40 mg


   Documented by: 


Cyanocobalamin (Vitamin B-12)  1,000 mcg PO [facility]


   Last Admin: 02/08/20 19:50 Dose:  1,000 mcg


   Documented by: 


Diltiazem HCl (Cardizem Oral)  120 mg PO BID [facility]


   Last Admin: 02/08/20 19:50 Dose:  120 mg


   Documented by: 


Enoxaparin Sodium (Lovenox)  30 mg SQ [facility]


   Last Admin: 02/08/20 19:50 Dose:  30 mg


   Documented by: 


Furosemide (Lasix)  60 mg IV Q12HR [facility]


   Last Admin: 02/08/20 19:51 Dose:  60 mg


   Documented by: 


Hydralazine HCl (Apresoline)  50 mg PO TID [facility]


   Last Admin: 02/08/20 19:53 Dose:  Not Given


   Documented by: 


Insulin Aspart (Novolog)  0 unit SQ ACHS [facility]; Protocol


   Last Admin: 02/08/20 21:19 Dose:  2 unit


   Documented by: 


Levothyroxine Sodium (Synthroid)  112 mcg PO DAILY@0630 [facility]


   Last Admin: 02/08/20 06:04 Dose:  112 mcg


   Documented by: 


Ondansetron HCl (Zofran)  4 mg IVP Q6HR PRN


   PRN Reason: Nausea And Vomiting


   Last Admin: 02/08/20 20:02 Dose:  4 mg


   Documented by: 


Oxycodone/Acetaminophen (Percocet )  1 each PO TID PRN


   PRN Reason: Pain


   Last Admin: 02/08/20 18:10 Dose:  1 each


   Documented by: 








Physical examination:


VITAL SIGNS: Afebrile, 79, 16, 154/69, 94% on room air


GENERAL: Sitting at the edge of the bed, tired


EYES: Pupils equal.  Conjunctiva normal.


HEENT: External appearance of nose and ears normal, oral cavity grossly normal.


NECK: JVD not raised; masses not palpable.


HEART: First and second heart sounds are normal;  no edema.  


LUNGS: Respiratory rate normal; clear to auscultation.  


ABDOMEN: Soft,  nontender, liver spleen not palpable, no masses palpable.  


PSYCH: Alert and oriented x3;  mood  and affect ariel


MUSCULOSKELETAL: Evidence of OA especially in the hands l.  











INVESTIGATIONS, reviewed in the clinical context:


Potassium 4.3 bun 61 crit 5.23








Previous testing


White count 6.5 hemoglobin 11.2 platelets 293 pressure 5.7 bun 70 creatinine 

6.06


Baseline creatinine supposed to be 2.2


UA positive for leukoesterase, WBC


Urine drug screen positive for oxycodone


EKG tracing personally reviewed by me shows-sinus rhythm


Chest x-ray film personally reviewed by me-elevated right diaphragm, lung fields

are clear


Computed tomography scan of the brain-lung fields clear





Assessment:


-Diabetes mellitus type 2 uncontrolled with episodes of hypoglycemia, 

symptomatic, improving


-Acute kidney injury likely ATN with a contribution from patient being on ACE 

inhibitor Lasix, slow to improve


-Chronic kidney disease stage IV from diabetic nephropathy, symptomatic with 

signs of uremia including weak tired nausea some vomiting


-Hyperlipidemia


-Essential hypertension, uncontrolled, slow to respond


-Depression not otherwise


-Primary osteoarthritis


-Morbid obesity BMI 44.2


-Hyperkalemia secondary to acute kidney injury and patient be on ACE inhibitor


-Hypomagnesemia


-Hyperphosphatemia





Plan:


Patient had declined hemodialysis on Friday wanted to think about it more.  I 

had a very lengthy discussion with the patient in the present off her friend.  

Patient is finally decided to proceed with hemodialysis.  I did speak to 

 from nephrology.  When I spoke to Dr. Vera from [facility].

 He will come in to put a hemodialysis temporary catheter.  Hemodialysis should 

start tomorrow.  Several questions were answered.  Total time spent was about 45

minutes with over 30 minutes of discussion

## 2020-02-08 NOTE — CONS
CONSULTATION



Ms. Lynn was seen on consultation for placement of the dialysis catheter.  The

patient has a history of acute kidney injury secondary to ATN.  The patient has some

history of vomiting and poor intake.  The patient has also chronic kidney disease

secondary to diabetes.  The patient also has history of hyperkalemia, metabolic

acidosis, insulin-dependent diabetes mellitus.  The patient was seen in her room.

Patient in sitting position.  We were planning to place the dialysis catheter, but the

patient is eating dinner.



PHYSICAL EXAMINATION:

NECK:  Supple.  Trachea central.

CHEST:  Clear.

ABDOMEN:  Soft.  Mild obesity noted.  The femorals are 1+.



PLAN:

Placement of the dialysis catheter.  N.p.o. midnight.  We will place early in the

morning.





MMODL / IJN: [id number] / Job#: [id number]

## 2020-02-09 LAB
ANION GAP SERPL CALC-SCNC: 7 MMOL/L
BUN SERPL-SCNC: 63 MG/DL (ref 7–17)
CALCIUM SPEC-MCNC: 8 MG/DL (ref 8.4–10.2)
CHLORIDE SERPL-SCNC: 101 MMOL/L (ref 98–107)
CO2 SERPL-SCNC: 26 MMOL/L (ref 22–30)
GLUCOSE BLD-MCNC: 128 MG/DL (ref 75–99)
GLUCOSE BLD-MCNC: 138 MG/DL (ref 75–99)
GLUCOSE BLD-MCNC: 242 MG/DL (ref 75–99)
GLUCOSE BLD-MCNC: 249 MG/DL (ref 75–99)
GLUCOSE SERPL-MCNC: 116 MG/DL (ref 74–99)
POTASSIUM SERPL-SCNC: 4.4 MMOL/L (ref 3.5–5.1)
SODIUM SERPL-SCNC: 134 MMOL/L (ref 137–145)

## 2020-02-09 RX ADMIN — FUROSEMIDE SCH MG: 10 INJECTION, SOLUTION INTRAMUSCULAR; INTRAVENOUS at 22:16

## 2020-02-09 RX ADMIN — OXYCODONE HYDROCHLORIDE AND ACETAMINOPHEN PRN EACH: 10; 325 TABLET ORAL at 12:46

## 2020-02-09 RX ADMIN — INSULIN ASPART SCH UNIT: 100 INJECTION, SOLUTION INTRAVENOUS; SUBCUTANEOUS at 22:17

## 2020-02-09 RX ADMIN — OXYCODONE HYDROCHLORIDE AND ACETAMINOPHEN PRN EACH: 10; 325 TABLET ORAL at 02:06

## 2020-02-09 RX ADMIN — DILTIAZEM HYDROCHLORIDE SCH MG: 60 TABLET, FILM COATED ORAL at 22:14

## 2020-02-09 RX ADMIN — LIDOCAINE HYDROCHLORIDE ONE ML: 10 INJECTION, SOLUTION INFILTRATION; PERINEURAL at 08:13

## 2020-02-09 RX ADMIN — CYANOCOBALAMIN TAB 500 MCG SCH MCG: 500 TAB at 22:17

## 2020-02-09 RX ADMIN — INSULIN ASPART SCH UNIT: 100 INJECTION, SOLUTION INTRAVENOUS; SUBCUTANEOUS at 17:24

## 2020-02-09 RX ADMIN — FUROSEMIDE SCH MG: 10 INJECTION, SOLUTION INTRAMUSCULAR; INTRAVENOUS at 14:41

## 2020-02-09 RX ADMIN — ATORVASTATIN CALCIUM SCH MG: 20 TABLET, FILM COATED ORAL at 14:41

## 2020-02-09 RX ADMIN — INSULIN ASPART SCH: 100 INJECTION, SOLUTION INTRAVENOUS; SUBCUTANEOUS at 05:45

## 2020-02-09 RX ADMIN — MIDAZOLAM ONE MG: 1 INJECTION INTRAMUSCULAR; INTRAVENOUS at 08:14

## 2020-02-09 RX ADMIN — BISACODYL PRN MG: 5 TABLET, COATED ORAL at 16:18

## 2020-02-09 RX ADMIN — INSULIN ASPART SCH UNIT: 100 INJECTION, SOLUTION INTRAVENOUS; SUBCUTANEOUS at 12:42

## 2020-02-09 RX ADMIN — OXYCODONE HYDROCHLORIDE AND ACETAMINOPHEN PRN EACH: 10; 325 TABLET ORAL at 20:53

## 2020-02-09 RX ADMIN — ENOXAPARIN SODIUM SCH MG: 30 INJECTION SUBCUTANEOUS at 22:17

## 2020-02-09 RX ADMIN — LIDOCAINE HYDROCHLORIDE ONE ML: 10 INJECTION, SOLUTION INFILTRATION; PERINEURAL at 08:17

## 2020-02-09 RX ADMIN — IPRATROPIUM BROMIDE AND ALBUTEROL SULFATE SCH ML: .5; 3 SOLUTION RESPIRATORY (INHALATION) at 07:36

## 2020-02-09 RX ADMIN — IPRATROPIUM BROMIDE AND ALBUTEROL SULFATE SCH: .5; 3 SOLUTION RESPIRATORY (INHALATION) at 20:28

## 2020-02-09 RX ADMIN — CITALOPRAM HYDROBROMIDE SCH MG: 20 TABLET ORAL at 14:41

## 2020-02-09 RX ADMIN — MIDAZOLAM ONE MG: 1 INJECTION INTRAMUSCULAR; INTRAVENOUS at 08:10

## 2020-02-09 RX ADMIN — DILTIAZEM HYDROCHLORIDE SCH MG: 60 TABLET, FILM COATED ORAL at 14:41

## 2020-02-09 RX ADMIN — IPRATROPIUM BROMIDE AND ALBUTEROL SULFATE SCH ML: .5; 3 SOLUTION RESPIRATORY (INHALATION) at 10:59

## 2020-02-09 RX ADMIN — LEVOTHYROXINE SODIUM SCH MCG: 0.11 TABLET ORAL at 05:19

## 2020-02-09 NOTE — P.PN
Progress Note - Text


Progress Note Date: 02/09/20





Chief Complaint: Tired





History of presenting complaint:


This is a pleasant 74 a patient of Dr. Ashraf.  Chronic stable medical 

conditions include hypertension, hyperlipidemia, depression, anxiety, 

osteoarthritis.  Patient about a week ago felt that she cannot really speak felt

out of sorts.  EMS was called out.  Follow Accu-Cheks to be 43.  Received 

glucose and felt better.  She did not go to the hospital.  Since then she been 

having nausea vomiting and predominantly throwing up.  She went to see a pain 

specialist Dr. Roy and she was again incoherent of that.  Decided to send 

her to the ER.  No abdominal pain.  Has been having some chills.  In the ER 

found to be in acute renal failure.  Patient's baseline creatinine per 

nephrology rundown 2.2.


Admitted with-episodes of hypoglycemia, symptomatic, acute kidney injury likely 

ATN.  Oral hypoglycemics were discontinued.


Today-patient had a hemodialysis catheter placed this morning.  Did get first 

round of hemodialysis today.  Patient's appearing more relaxed today.


Review of systems: Was done for constitutional, cardiovascular, GI, pulmonary. 

relevant finding as above





Active Medications





Albuterol/Ipratropium (Duoneb 0.5 Mg-3 Mg/3 Ml Soln)  3 ml INHALATION RT-TID [facility]


   Last Admin: 02/09/20 20:28 Dose:  Not Given


   Documented by: 


Atorvastatin Calcium (Lipitor)  20 mg PO DAILY [facility]


   Last Admin: 02/09/20 14:41 Dose:  20 mg


   Documented by: 


Bisacodyl (Dulcolax)  10 mg PO DAILY PRN


   PRN Reason: Constipation


   Last Admin: 02/09/20 16:18 Dose:  10 mg


   Documented by: 


Calcitriol (Rocaltrol)  0.5 mcg PO LARIOS [facility]


   Last Admin: 02/09/20 14:40 Dose:  0.5 mcg


   Documented by: 


Citalopram Hydrobromide (Celexa)  40 mg PO DAILY [facility]


   Last Admin: 02/09/20 14:41 Dose:  40 mg


   Documented by: 


Cyanocobalamin (Vitamin B-12)  1,000 mcg PO HS [facility]


   Last Admin: 02/08/20 19:50 Dose:  1,000 mcg


   Documented by: 


Diltiazem HCl (Cardizem Oral)  120 mg PO BID [facility]


   Last Admin: 02/09/20 14:41 Dose:  120 mg


   Documented by: 


Enoxaparin Sodium (Lovenox)  30 mg SQ HS [facility]


   Last Admin: 02/08/20 19:50 Dose:  30 mg


   Documented by: 


Furosemide (Lasix)  60 mg IV Q12HR [facility]


   Last Admin: 02/09/20 14:41 Dose:  60 mg


   Documented by: 


Hydralazine HCl (Apresoline)  50 mg PO TID [facility]


   Last Admin: 02/09/20 16:18 Dose:  50 mg


   Documented by: 


Insulin Aspart (Novolog)  0 unit SQ ACHS [facility]; Protocol


   Last Admin: 02/09/20 17:24 Dose:  5 unit


   Documented by: 


Levothyroxine Sodium (Synthroid)  112 mcg PO DAILY@0630 [facility]


   Last Admin: 02/09/20 05:19 Dose:  112 mcg


   Documented by: 


Ondansetron HCl (Zofran)  4 mg IVP Q6HR PRN


   PRN Reason: Nausea And Vomiting


   Last Admin: 02/08/20 20:02 Dose:  4 mg


   Documented by: 


Oxycodone/Acetaminophen (Percocet )  1 each PO TID PRN


   PRN Reason: Pain


   Last Admin: 02/09/20 20:53 Dose:  1 each


   Documented by: 











Physical examination:


VITAL SIGNS: 97.2-84-/72-98% on room air


GENERAL: Laying in bed, more comfortable


EYES: Pupils equal.  Conjunctiva normal.


HEENT: External appearance of nose and ears normal, oral cavity grossly normal.


NECK: JVD not raised; masses not palpable.


HEART: First and second heart sounds are normal;  no edema.  


LUNGS: Respiratory rate normal; clear to auscultation.  


ABDOMEN: Soft,  nontender, liver spleen not palpable, no masses palpable.  


PSYCH: Alert and oriented x3;  mood  and affect ariel


MUSCULOSKELETAL: Evidence of OA especially in the hands l.  











INVESTIGATIONS, reviewed in the clinical context:


Potassium 4.4 bun 63 creatinine 5.5








Previous testing


White count 6.5 hemoglobin 11.2 platelets 293 pressure 5.7 bun 70 creatinine 

6.06


Baseline creatinine supposed to be 2.2


UA positive for leukoesterase, WBC


Urine drug screen positive for oxycodone


EKG tracing personally reviewed by me shows-sinus rhythm


Chest x-ray film personally reviewed by me-elevated right diaphragm, lung fields

are clear


Computed tomography scan of the brain-lung fields clear





Assessment:


-Diabetes mellitus type 2 uncontrolled with episodes of hypoglycemia, 

symptomatic, corrected


-Hemodialysis catheter placed today on February 9-.  Hemodialysis today


-Acute kidney injury likely ATN with a contribution from patient being on ACE 

inhibitor Lasix, slow to improve


-Chronic kidney disease stage IV from diabetic nephropathy, symptomatic with 

signs of uremia including weak tired nausea some vomiting


-Hyperlipidemia


-Essential hypertension, controlled


-Depression not otherwise


-Primary osteoarthritis


-Morbid obesity BMI 44.2


-Hyperkalemia secondary to acute kidney injury and patient be on ACE inhibitor, 

corrected


-Hypomagnesemia


-Hyperphosphatemia





Plan:


Patient at first round of hemodialysis done today.  Feeling better.  More 

hemodialysis tomorrow.  Care was discussed.

## 2020-02-09 NOTE — XR
EXAMINATION TYPE: XR chest 1V

 

DATE OF EXAM: 2/9/2020

 

HISTORY: permacath placement.

 

REFERENCE: Previous study dated 2/4/2020.

 

FINDINGS: A large-bore, double-lumen catheter is been inserted via a right internal jugular approach.
 Its tip is in the right atrium. I do not see evidence of pneumothorax.

 

There is vascular congestion and pulmonary edema. This appears to have worsened slightly. There is co
nfluent airspace disease the right lung base. This may represent confluent edema or pneumonia. The he
art is enlarged. There are bilateral effusions.

 

IMPRESSION: 

1. SATISFACTORY DIALYSIS CATHETER PLACEMENT.

2. WORSENING CHANGES OF HEART FAILURE.

## 2020-02-09 NOTE — P.PN
Subjective


Progress Note Date: 02/09/20


Follow-up for acute kidney injury.  Good urine output.  Nausea and slight 

vomiting.  Permacath was placed today.








Objective





- Vital Signs


Vital signs: 


                                   Vital Signs











Temp  96.8 F L  02/09/20 10:26


 


Pulse  88   02/09/20 11:11


 


Resp  16   02/09/20 10:26


 


BP  162/75   02/09/20 10:26


 


Pulse Ox  92 L  02/09/20 10:26








                                 Intake & Output











 02/08/20 02/09/20 02/09/20





 18:59 06:59 18:59


 


Intake Total 477 240 50


 


Output Total  1150 550


 


Balance 477 -640 -500


 


Weight  115.8 kg 


 


Intake:   


 


  IV   50


 


  Oral 477 240 0


 


Output:   


 


  Urine  1150 550


 


Other:   


 


  Voiding Method Toilet  Toilet














- Exam


No acute distress


S1-S2 heard, right jugular permacath


Lungs clear


Abdomen soft


Edema








- Labs


CBC & Chem 7: 


                                 02/04/20 15:00





                                 02/09/20 06:25


Labs: 


                  Abnormal Lab Results - Last 24 Hours (Table)











  02/08/20 02/08/20 02/08/20 Range/Units





  12:12 16:57 20:49 


 


Sodium     (137-145)  mmol/L


 


BUN     (7-17)  mg/dL


 


Creatinine     (0.52-1.04)  mg/dL


 


Glucose     (74-99)  mg/dL


 


POC Glucose (mg/dL)  179 H  220 H  164 H  (75-99)  mg/dL


 


Calcium     (8.4-10.2)  mg/dL














  02/09/20 02/09/20 Range/Units





  05:37 06:25 


 


Sodium   134 L  (137-145)  mmol/L


 


BUN   63 H  (7-17)  mg/dL


 


Creatinine   5.51 H  (0.52-1.04)  mg/dL


 


Glucose   116 H  (74-99)  mg/dL


 


POC Glucose (mg/dL)  128 H   (75-99)  mg/dL


 


Calcium   8.0 L  (8.4-10.2)  mg/dL














Assessment and Plan


Assessment: 


#1 nonoliguric acute kidney injury secondary to hemodynamic ATN.  


#2 chronic kidney disease stage IV secondary to diabetic nephropathy with a 

baseline creatinine of 2.2.


#3 hypertension with chronic kidney disease


#4 volume overload with 11 pound weight gain since admission


#5 insulin-dependent diabetes


#6 hypervolemic hyponatremia





Plan: 


#1 start hemodialysis today 2 hours 1-2 L UF 2K bath and 28 bicarb.  Plan 

dialysis again tomorrow.


#2 continue with Lasix 60 mg IV twice a day


#3 outpatient dialysis placement HD at [facility]

## 2020-02-09 NOTE — PCN
PROCEDURE NOTE



PREOP:

Acute on chronic renal failure.



POSTOP DIAGNOSIS:

Acute on chronic renal failure.



PROCEDURE PERFORMED:

Placement of ultrasound guided 19 cm dialysis catheter.



SEDATION:

Sedation time is 32 minutes.



DESCRIPTION OF PROCEDURE:

This patient was brought to the cath lab.  Right side of the neck and chest was prepped

and drapes applied in the usual sterile manner.  1% lidocaine infiltrated into the neck

and chest area with IV sedation.  Ultrasound-guided micropuncture into the right

jugular vein.  Micropuncture guidewire was passed and 4-Georgian dilator advanced on top

of the guidewire then we passed a regular guidewire, then advanced the dilator and

sheath on the top of the guidewire.  Through the sheath, we introduced the dialysis

catheter.  Tip of the catheter in superior vena cava and atrium flushed with heparin

saline and hep-locked and secured with 3-0 Vicryl.  Dressing applied.  Patient

tolerated the procedure well.





MMODL / IJN: [id number] / Job#: [id number]

## 2020-02-10 LAB
GLUCOSE BLD-MCNC: 136 MG/DL (ref 75–99)
GLUCOSE BLD-MCNC: 156 MG/DL (ref 75–99)
GLUCOSE BLD-MCNC: 174 MG/DL (ref 75–99)
GLUCOSE BLD-MCNC: 215 MG/DL (ref 75–99)
HBV SURFACE AB SERPL IA-ACNC: 3.5 MIU/ML

## 2020-02-10 RX ADMIN — DILTIAZEM HYDROCHLORIDE SCH MG: 60 TABLET, FILM COATED ORAL at 08:59

## 2020-02-10 RX ADMIN — IPRATROPIUM BROMIDE AND ALBUTEROL SULFATE SCH ML: .5; 3 SOLUTION RESPIRATORY (INHALATION) at 14:05

## 2020-02-10 RX ADMIN — ENOXAPARIN SODIUM SCH MG: 30 INJECTION SUBCUTANEOUS at 21:50

## 2020-02-10 RX ADMIN — INSULIN ASPART SCH UNIT: 100 INJECTION, SOLUTION INTRAVENOUS; SUBCUTANEOUS at 21:50

## 2020-02-10 RX ADMIN — OXYCODONE HYDROCHLORIDE AND ACETAMINOPHEN PRN EACH: 10; 325 TABLET ORAL at 05:28

## 2020-02-10 RX ADMIN — IPRATROPIUM BROMIDE AND ALBUTEROL SULFATE SCH ML: .5; 3 SOLUTION RESPIRATORY (INHALATION) at 20:46

## 2020-02-10 RX ADMIN — ONDANSETRON PRN MG: 2 INJECTION INTRAMUSCULAR; INTRAVENOUS at 04:22

## 2020-02-10 RX ADMIN — CYANOCOBALAMIN TAB 500 MCG SCH MCG: 500 TAB at 21:47

## 2020-02-10 RX ADMIN — IPRATROPIUM BROMIDE AND ALBUTEROL SULFATE SCH ML: .5; 3 SOLUTION RESPIRATORY (INHALATION) at 10:07

## 2020-02-10 RX ADMIN — OXYCODONE HYDROCHLORIDE AND ACETAMINOPHEN PRN EACH: 10; 325 TABLET ORAL at 13:58

## 2020-02-10 RX ADMIN — INSULIN ASPART SCH UNIT: 100 INJECTION, SOLUTION INTRAVENOUS; SUBCUTANEOUS at 17:31

## 2020-02-10 RX ADMIN — CITALOPRAM HYDROBROMIDE SCH MG: 20 TABLET ORAL at 08:59

## 2020-02-10 RX ADMIN — FUROSEMIDE SCH MG: 10 INJECTION, SOLUTION INTRAMUSCULAR; INTRAVENOUS at 21:48

## 2020-02-10 RX ADMIN — FUROSEMIDE SCH MG: 10 INJECTION, SOLUTION INTRAMUSCULAR; INTRAVENOUS at 08:59

## 2020-02-10 RX ADMIN — DILTIAZEM HYDROCHLORIDE SCH MG: 60 TABLET, FILM COATED ORAL at 21:48

## 2020-02-10 RX ADMIN — INSULIN ASPART SCH UNIT: 100 INJECTION, SOLUTION INTRAVENOUS; SUBCUTANEOUS at 12:29

## 2020-02-10 RX ADMIN — ATORVASTATIN CALCIUM SCH MG: 20 TABLET, FILM COATED ORAL at 08:59

## 2020-02-10 RX ADMIN — INSULIN ASPART SCH UNIT: 100 INJECTION, SOLUTION INTRAVENOUS; SUBCUTANEOUS at 06:47

## 2020-02-10 RX ADMIN — LEVOTHYROXINE SODIUM SCH MCG: 0.11 TABLET ORAL at 05:28

## 2020-02-10 RX ADMIN — OXYCODONE HYDROCHLORIDE AND ACETAMINOPHEN PRN EACH: 10; 325 TABLET ORAL at 21:47

## 2020-02-10 NOTE — PN
PROGRESS NOTE



The patient is seen for followup for chronic kidney disease.  She was started on

dialysis over the weekend.  The patient states that she is feeling somewhat better

currently.  She did have an element of acute kidney injury on top of chronic kidney

disease with creatinine all the way up to 6.0 mg/dL. The patient states that she lives

just about a mile from the [facility] and would like to go to the [facility] for her outpatient treatments.  She continues to have urine output.



PHYSICAL EXAMINATION:

On examination, blood pressure is 134/73, heart rate 73 per minute. She is afebrile.

EXAMINATION OF THE HEART: S1 and S2.

EXAMINATION OF THE LUNGS: Bilateral breath sounds are heard.

ABDOMEN:  Abdomen is soft and nontender.

Examination of the lower extremities shows edema 2+ bilaterally.

CNS examination is grossly intact.



LABORATORY DATA:

Laboratories show sodium 134, potassium 4.4, chloride 101, BUN 63, creatinine 5.5

today.



ASSESSMENT:

1. Acute kidney injury on top of chronic kidney disease, currently hemodialysis-

    dependent. Patient will be maintained on outpatient dialysis and we will continue

    to monitor for recovery of renal function.

2. Chronic kidney disease secondary to diabetic nephropathy, NKF stage IV. Previous

    creatinine about 2.5 mg/dL.

3. Volume overload, improved post dialysis. Maintain on Lasix as well.

4. Type 2 diabetes.

5. Hypertension, currently stable.



PLAN:

We will plan for outpatient dialysis.  Continue with IV Lasix.  Plan for hemodialysis

again on 2/12/2020.





MMODL / IJN: [id number] / Job#: [id number]

## 2020-02-10 NOTE — P.PN
Progress Note - Text


Progress Note Date: 02/10/20





Chief Complaint: Tired





History of presenting complaint:


This is a pleasant 74 a patient of Dr. Ashraf.  Chronic stable medical 

conditions include hypertension, hyperlipidemia, depression, anxiety, 

osteoarthritis.  Patient about a week ago felt that she cannot really speak felt

out of sorts.  EMS was called out.  Follow Accu-Cheks to be 43.  Received 

glucose and felt better.  She did not go to the hospital.  Since then she been 

having nausea vomiting and predominantly throwing up.  She went to see a pain 

specialist Dr. Roy and she was again incoherent of that.  Decided to send 

her to the ER.  No abdominal pain.  Has been having some chills.  In the ER 

found to be in acute renal failure.  Patient's baseline creatinine per 

nephrology rundown 2.2.


Hospital course:


Admitted with-episodes of hypoglycemia, symptomatic, acute kidney injury likely 

ATN.  Oral hypoglycemics were discontinued.  Kidney function progressed.  

Patient symptomatic with uremic symptoms.  Decision made to proceed with 

hemodialysis after discussing with the patient.


Today-getting hemodialyzed again today.  2 L being removed.  Starting a diet.  

Make urine.





Review of systems: Was done for constitutional, cardiovascular, GI, pulmonary. 

relevant finding as above





Active Medications





Albuterol/Ipratropium (Duoneb 0.5 Mg-3 Mg/3 Ml Soln)  3 ml INHALATION RT-TID [facility]


   Last Admin: 02/10/20 14:05 Dose:  3 ml


   Documented by: 


Atorvastatin Calcium (Lipitor)  20 mg PO DAILY [facility]


   Last Admin: 02/10/20 08:59 Dose:  20 mg


   Documented by: 


Bisacodyl (Dulcolax)  10 mg PO DAILY PRN


   PRN Reason: Constipation


   Last Admin: 02/09/20 16:18 Dose:  10 mg


   Documented by: 


Calcitriol (Rocaltrol)  0.5 mcg PO LARIOS [facility]


   Last Admin: 02/09/20 14:40 Dose:  0.5 mcg


   Documented by: 


Citalopram Hydrobromide (Celexa)  40 mg PO DAILY [facility]


   Last Admin: 02/10/20 08:59 Dose:  40 mg


   Documented by: 


Cyanocobalamin (Vitamin B-12)  1,000 mcg PO HS [facility]


   Last Admin: 02/09/20 22:17 Dose:  1,000 mcg


   Documented by: 


Diltiazem HCl (Cardizem Oral)  120 mg PO BID [facility]


   Last Admin: 02/10/20 08:59 Dose:  120 mg


   Documented by: 


Enoxaparin Sodium (Lovenox)  30 mg SQ HS [facility]


   Last Admin: 02/09/20 22:17 Dose:  30 mg


   Documented by: 


Furosemide (Lasix)  60 mg IV Q12HR [facility]


   Last Admin: 02/10/20 08:59 Dose:  60 mg


   Documented by: 


Hydralazine HCl (Apresoline)  50 mg PO TID [facility]


   Last Admin: 02/10/20 08:59 Dose:  50 mg


   Documented by: 


Insulin Aspart (Novolog)  0 unit SQ ACHS [facility]; Protocol


   Last Admin: 02/10/20 12:29 Dose:  2 unit


   Documented by: 


Levothyroxine Sodium (Synthroid)  112 mcg PO DAILY@0630 [facility]


   Last Admin: 02/10/20 05:28 Dose:  112 mcg


   Documented by: 


Ondansetron HCl (Zofran)  4 mg IVP Q6HR PRN


   PRN Reason: Nausea And Vomiting


   Last Admin: 02/10/20 04:22 Dose:  4 mg


   Documented by: 


Oxycodone/Acetaminophen (Percocet )  1 each PO TID PRN


   PRN Reason: Pain


   Last Admin: 02/10/20 13:58 Dose:  1 each


   Documented by: 











Physical examination:


VITAL SIGNS: 98, 72, 18, 117/63, 92% on room air


GENERAL: Laying in bed, comfortable


EYES: Pupils equal.  Conjunctiva normal.


HEENT: External appearance of nose and ears normal, oral cavity grossly normal.


NECK: JVD not raised; masses not palpable.


HEART: First and second heart sounds are normal;  no edema.  


LUNGS: Respiratory rate normal; clear to auscultation.  


ABDOMEN: Soft,  nontender, liver spleen not palpable, no masses palpable.  


PSYCH: Alert and oriented x3;  mood  and affect ariel


MUSCULOSKELETAL: Evidence of OA especially in the hands l.  











INVESTIGATIONS, reviewed in the clinical context:


Potassium 4.4 bun 63 creatinine 5.5








Previous testing


White count 6.5 hemoglobin 11.2 platelets 293 pressure 5.7 bun 70 creatinine 

6.06


Baseline creatinine supposed to be 2.2


UA positive for leukoesterase, WBC


Urine drug screen positive for oxycodone


EKG tracing personally reviewed by me shows-sinus rhythm


Chest x-ray film personally reviewed by me-elevated right diaphragm, lung fields

are clear


Computed tomography scan of the brain-lung fields clear





Assessment:


-Diabetes mellitus type 2 uncontrolled with episodes of hypoglycemia, 

symptomatic, corrected


-Hemodialysis catheter placed on February 9-.  Hemodialysis day 2 today


-Acute kidney injury likely ATN with a contribution from patient being on ACE 

inhibitor Lasix, slow to improve


-Chronic kidney disease stage IV from diabetic nephropathy, symptomatic with 

signs of uremia including weak tired nausea some vomiting


-Hyperlipidemia


-Essential hypertension, controlled


-Depression not otherwise


-Primary osteoarthritis


-Morbid obesity BMI 44.2


-Hyperkalemia secondary to acute kidney injury and patient be on ACE inhibitor, 

corrected


-Hypomagnesemia


-Hyperphosphatemia





Plan:


-Care discussed with the patient.  Second order hemodialysis today.  Start the 

patient on Glucotrol 2.5 mg daily.

## 2020-02-10 NOTE — IR
EXAMINATION TYPE: IR cvc insert central tunneled

 

DATE OF EXAM: 2/9/2020

 

COMPARISON: NONE

 

HISTORY: Fluoroscopy time.

 

Fluoroscopy was provided to the referring clinician.

## 2020-02-11 LAB
ANION GAP SERPL CALC-SCNC: 10 MMOL/L
BUN SERPL-SCNC: 43 MG/DL (ref 7–17)
CALCIUM SPEC-MCNC: 8.1 MG/DL (ref 8.4–10.2)
CHLORIDE SERPL-SCNC: 96 MMOL/L (ref 98–107)
CO2 SERPL-SCNC: 26 MMOL/L (ref 22–30)
GLUCOSE BLD-MCNC: 122 MG/DL (ref 75–99)
GLUCOSE BLD-MCNC: 143 MG/DL (ref 75–99)
GLUCOSE BLD-MCNC: 153 MG/DL (ref 75–99)
GLUCOSE BLD-MCNC: 214 MG/DL (ref 75–99)
GLUCOSE SERPL-MCNC: 120 MG/DL (ref 74–99)
POTASSIUM SERPL-SCNC: 4.1 MMOL/L (ref 3.5–5.1)
SODIUM SERPL-SCNC: 132 MMOL/L (ref 137–145)

## 2020-02-11 RX ADMIN — IPRATROPIUM BROMIDE AND ALBUTEROL SULFATE SCH ML: .5; 3 SOLUTION RESPIRATORY (INHALATION) at 12:18

## 2020-02-11 RX ADMIN — IPRATROPIUM BROMIDE AND ALBUTEROL SULFATE SCH ML: .5; 3 SOLUTION RESPIRATORY (INHALATION) at 20:45

## 2020-02-11 RX ADMIN — ONDANSETRON PRN MG: 2 INJECTION INTRAMUSCULAR; INTRAVENOUS at 22:30

## 2020-02-11 RX ADMIN — FUROSEMIDE SCH MG: 10 INJECTION, SOLUTION INTRAMUSCULAR; INTRAVENOUS at 22:11

## 2020-02-11 RX ADMIN — ATORVASTATIN CALCIUM SCH MG: 20 TABLET, FILM COATED ORAL at 10:17

## 2020-02-11 RX ADMIN — OXYCODONE HYDROCHLORIDE AND ACETAMINOPHEN PRN EACH: 10; 325 TABLET ORAL at 22:12

## 2020-02-11 RX ADMIN — BISACODYL PRN MG: 5 TABLET, COATED ORAL at 11:14

## 2020-02-11 RX ADMIN — IPRATROPIUM BROMIDE AND ALBUTEROL SULFATE SCH ML: .5; 3 SOLUTION RESPIRATORY (INHALATION) at 08:02

## 2020-02-11 RX ADMIN — FUROSEMIDE SCH MG: 10 INJECTION, SOLUTION INTRAMUSCULAR; INTRAVENOUS at 10:19

## 2020-02-11 RX ADMIN — ENOXAPARIN SODIUM SCH MG: 30 INJECTION SUBCUTANEOUS at 22:11

## 2020-02-11 RX ADMIN — CITALOPRAM HYDROBROMIDE SCH MG: 20 TABLET ORAL at 10:18

## 2020-02-11 RX ADMIN — DILTIAZEM HYDROCHLORIDE SCH MG: 60 TABLET, FILM COATED ORAL at 10:19

## 2020-02-11 RX ADMIN — INSULIN ASPART SCH UNIT: 100 INJECTION, SOLUTION INTRAVENOUS; SUBCUTANEOUS at 22:12

## 2020-02-11 RX ADMIN — DILTIAZEM HYDROCHLORIDE SCH MG: 60 TABLET, FILM COATED ORAL at 22:11

## 2020-02-11 RX ADMIN — INSULIN ASPART SCH UNIT: 100 INJECTION, SOLUTION INTRAVENOUS; SUBCUTANEOUS at 17:15

## 2020-02-11 RX ADMIN — ONDANSETRON PRN MG: 2 INJECTION INTRAMUSCULAR; INTRAVENOUS at 06:20

## 2020-02-11 RX ADMIN — OXYCODONE HYDROCHLORIDE AND ACETAMINOPHEN PRN EACH: 10; 325 TABLET ORAL at 15:33

## 2020-02-11 RX ADMIN — INSULIN ASPART SCH: 100 INJECTION, SOLUTION INTRAVENOUS; SUBCUTANEOUS at 15:26

## 2020-02-11 RX ADMIN — INSULIN ASPART SCH UNIT: 100 INJECTION, SOLUTION INTRAVENOUS; SUBCUTANEOUS at 06:53

## 2020-02-11 RX ADMIN — OXYCODONE HYDROCHLORIDE AND ACETAMINOPHEN PRN EACH: 10; 325 TABLET ORAL at 06:53

## 2020-02-11 RX ADMIN — LEVOTHYROXINE SODIUM SCH MCG: 0.11 TABLET ORAL at 06:29

## 2020-02-11 RX ADMIN — CYANOCOBALAMIN TAB 500 MCG SCH MCG: 500 TAB at 22:11

## 2020-02-11 NOTE — P.PN
Progress Note - Text


Progress Note Date: 02/11/20





Chief Complaint: Tired





History of presenting complaint:


This is a pleasant 74 a patient of Dr. Ashraf.  Chronic stable medical 

conditions include hypertension, hyperlipidemia, depression, anxiety, 

osteoarthritis.  Patient about a week ago felt that she cannot really speak felt

out of sorts.  EMS was called out.  Follow Accu-Cheks to be 43.  Received 

glucose and felt better.  She did not go to the hospital.  Since then she been 

having nausea vomiting and predominantly throwing up.  She went to see a pain 

specialist Dr. Roy and she was again incoherent of that.  Decided to send 

her to the ER.  No abdominal pain.  Has been having some chills.  In the ER 

found to be in acute renal failure.  Patient's baseline creatinine per 

nephrology rundown 2.2.


Hospital course:


Admitted with-episodes of hypoglycemia, symptomatic, acute kidney injury likely 

ATN.  Oral hypoglycemics were discontinued.  Kidney function progressed.  

Patient symptomatic with uremic symptoms.  Decision made to proceed with 

hemodialysis after discussing with the patient.


Today-feeling better.  Occasional nausea.  Less tired.  No hemodialysis today.





Review of systems: Was done for constitutional, cardiovascular, GI, pulmonary. 

relevant finding as above





Active Medications





Albuterol/Ipratropium (Duoneb 0.5 Mg-3 Mg/3 Ml Soln)  3 ml INHALATION RT-TID [facility]


   Last Admin: 02/11/20 12:18 Dose:  3 ml


   Documented by: 


Atorvastatin Calcium (Lipitor)  20 mg PO DAILY [facility]


   Last Admin: 02/11/20 10:17 Dose:  20 mg


   Documented by: 


Bisacodyl (Dulcolax)  10 mg PO DAILY PRN


   PRN Reason: Constipation


   Last Admin: 02/11/20 11:14 Dose:  10 mg


   Documented by: 


Calcitriol (Rocaltrol)  0.5 mcg PO LARIOS [facility]


   Last Admin: 02/09/20 14:40 Dose:  0.5 mcg


   Documented by: 


Citalopram Hydrobromide (Celexa)  40 mg PO DAILY [facility]


   Last Admin: 02/11/20 10:18 Dose:  40 mg


   Documented by: 


Cyanocobalamin (Vitamin B-12)  1,000 mcg PO HS [facility]


   Last Admin: 02/10/20 21:47 Dose:  1,000 mcg


   Documented by: 


Diltiazem HCl (Cardizem Oral)  120 mg PO BID [facility]


   Last Admin: 02/11/20 10:19 Dose:  120 mg


   Documented by: 


Enoxaparin Sodium (Lovenox)  30 mg SQ HS [facility]


   Last Admin: 02/10/20 21:50 Dose:  30 mg


   Documented by: 


Furosemide (Lasix)  60 mg IV Q12HR [facility]


   Last Admin: 02/11/20 10:19 Dose:  60 mg


   Documented by: 


Glipizide (Glucotrol)  2.5 mg PO AC-BRKFST [facility]


   Last Admin: 02/11/20 06:55 Dose:  2.5 mg


   Documented by: 


Hydralazine HCl (Apresoline)  50 mg PO TID [facility]


   Last Admin: 02/11/20 15:33 Dose:  50 mg


   Documented by: 


Insulin Aspart (Novolog)  0 unit SQ [facility]; Protocol


   Last Admin: 02/11/20 17:15 Dose:  3 unit


   Documented by: 


Levothyroxine Sodium (Synthroid)  112 mcg PO DAILY@0630 [facility]


   Last Admin: 02/11/20 06:29 Dose:  112 mcg


   Documented by: 


Ondansetron HCl (Zofran)  4 mg IVP Q6HR PRN


   PRN Reason: Nausea And Vomiting


   Last Admin: 02/11/20 06:20 Dose:  4 mg


   Documented by: 


Oxycodone/Acetaminophen (Percocet )  1 each PO TID PRN


   PRN Reason: Pain


   Last Admin: 02/11/20 15:33 Dose:  1 each


   Documented by: 








Physical examination:


VITAL SIGNS: 98.4-76-/62-94% room air


GENERAL: Propped up, comfortable


EYES: Pupils equal.  Conjunctiva normal.


HEENT: External appearance of nose and ears normal, oral cavity grossly normal.


NECK: JVD not raised; masses not palpable.


HEART: First and second heart sounds are normal;  no edema.  


LUNGS: Respiratory rate normal; clear to auscultation.  


ABDOMEN: Soft,  nontender, liver spleen not palpable, no masses palpable.  


PSYCH: Alert and oriented x3;  mood  and affect ariel


MUSCULOSKELETAL: Evidence of OA especially in the hands l.  











INVESTIGATIONS, reviewed in the clinical context:


Bun 43 creatinine 4.12


[id number]





Previous testing


White count 6.5 hemoglobin 11.2 platelets 293 pressure 5.7 bun 70 creatinine 

6.06


Baseline creatinine supposed to be 2.2


UA positive for leukoesterase, WBC


Urine drug screen positive for oxycodone


EKG tracing personally reviewed by me shows-sinus rhythm


Chest x-ray film personally reviewed by me-elevated right diaphragm, lung fields

are clear


Computed tomography scan of the brain-lung fields clear





Assessment:


-Diabetes mellitus type 2 uncontrolled with episodes of hypoglycemia, 

symptomatic, corrected.  Resumed on Glucotrol 2.5 mg daily


-Hemodialysis catheter placed on February 9-.


-Acute kidney injury likely ATN with a contribution from patient being on ACE 

inhibitor Lasix, slow to improve


-Chronic kidney disease stage IV from diabetic nephropathy, symptomatic with 

signs of uremia including weak tired nausea some vomiting


-Hyperlipidemia


-Essential hypertension, controlled


-Depression not otherwise


-Primary osteoarthritis


-Morbid obesity BMI 44.2


-Hyperkalemia secondary to acute kidney injury and patient be on ACE inhibitor, 

corrected


-Hypomagnesemia


-Hyperphosphatemia





Plan:


Discussed with patient.  Discussed with .  Possibly being scheduled 

for Monday Wednesday Friday as outpatient.  Discussed with Dr. Estrada from 

nephrology.  She'll get hemodialyzed tomorrow and should be able to be 

discharged.  Patient's friend with whom she lives will drive her to initiate 

hemodialysis sessions.

## 2020-02-11 NOTE — PN
PROGRESS NOTE



Patient is seen for followup for chronic kidney disease and acute kidney injury.

Patient is complaining of nausea.  Her creatinine is down to 4.1 today.  She has been

dialyzed 2 days in a row and she is scheduled for hemodialysis again tomorrow.



PHYSICAL EXAMINATION:

This morning, blood pressure is 134/62, heart rate 80 per minute, she is afebrile.

Examination of the heart S1, S2.  Examination of the lungs, bilateral breath sounds are

heard.  Abdomen is soft, non-tender.  Examination of the lower extremities shows no

significant edema. CNS exam grossly intact.



LABS:

Show sodium 132, potassium 4.1, chloride 96, BUN 43, creatinine 4.1.



ASSESSMENT:

1. Chronic kidney disease with element of acute kidney injury, currently on

    hemodialysis.  The patient has had urine output.  She will need monitoring of renal

    function as outpatient for possible recovery to some degree.

2. Chronic kidney disease secondary to diabetic nephropathy and give stage IV previous

    creatinine about 2.5.

3. Volume overload currently improved.

4. Type 2 diabetes.

5. Hypertension.

6. Nausea, most likely from uremia.



PLAN:

Hemodialysis in a.m.  Patient can be discharged tomorrow post dialysis to follow up as

outpatient for hemodialysis.  We will continue to monitor for possible recovery of

renal function as outpatient.  Patient should avoid any nephrotoxic medications

including NSAIDs.





MMODL / IJN: [id number] / Job#: [id number]

## 2020-02-12 VITALS
HEART RATE: 65 BPM | TEMPERATURE: 97.6 F | RESPIRATION RATE: 18 BRPM | DIASTOLIC BLOOD PRESSURE: 75 MMHG | SYSTOLIC BLOOD PRESSURE: 172 MMHG

## 2020-02-12 LAB
GLUCOSE BLD-MCNC: 114 MG/DL (ref 75–99)
GLUCOSE BLD-MCNC: 126 MG/DL (ref 75–99)
GLUCOSE BLD-MCNC: 72 MG/DL (ref 75–99)
GLUCOSE BLD-MCNC: 95 MG/DL (ref 75–99)

## 2020-02-12 RX ADMIN — BISACODYL PRN MG: 5 TABLET, COATED ORAL at 10:23

## 2020-02-12 RX ADMIN — FUROSEMIDE SCH MG: 10 INJECTION, SOLUTION INTRAMUSCULAR; INTRAVENOUS at 10:34

## 2020-02-12 RX ADMIN — IPRATROPIUM BROMIDE AND ALBUTEROL SULFATE SCH: .5; 3 SOLUTION RESPIRATORY (INHALATION) at 08:00

## 2020-02-12 RX ADMIN — INSULIN ASPART SCH: 100 INJECTION, SOLUTION INTRAVENOUS; SUBCUTANEOUS at 06:17

## 2020-02-12 RX ADMIN — OXYCODONE HYDROCHLORIDE AND ACETAMINOPHEN PRN EACH: 10; 325 TABLET ORAL at 14:07

## 2020-02-12 RX ADMIN — DILTIAZEM HYDROCHLORIDE SCH MG: 60 TABLET, FILM COATED ORAL at 10:22

## 2020-02-12 RX ADMIN — CITALOPRAM HYDROBROMIDE SCH MG: 20 TABLET ORAL at 10:33

## 2020-02-12 RX ADMIN — IPRATROPIUM BROMIDE AND ALBUTEROL SULFATE SCH: .5; 3 SOLUTION RESPIRATORY (INHALATION) at 13:22

## 2020-02-12 RX ADMIN — OXYCODONE HYDROCHLORIDE AND ACETAMINOPHEN PRN EACH: 10; 325 TABLET ORAL at 05:44

## 2020-02-12 RX ADMIN — ONDANSETRON PRN MG: 2 INJECTION INTRAMUSCULAR; INTRAVENOUS at 08:22

## 2020-02-12 RX ADMIN — ATORVASTATIN CALCIUM SCH MG: 20 TABLET, FILM COATED ORAL at 10:21

## 2020-02-12 RX ADMIN — LEVOTHYROXINE SODIUM SCH MCG: 0.11 TABLET ORAL at 06:48

## 2020-02-12 RX ADMIN — INSULIN ASPART SCH: 100 INJECTION, SOLUTION INTRAVENOUS; SUBCUTANEOUS at 12:42

## 2020-02-12 RX ADMIN — INSULIN ASPART SCH: 100 INJECTION, SOLUTION INTRAVENOUS; SUBCUTANEOUS at 17:42

## 2020-02-12 NOTE — PN
PROGRESS NOTE



Patient is seen for followup for acute kidney injury on top of chronic kidney disease.

She has been started on dialysis this admission.  Patient states that overall she is

feeling better, although she still has some nausea, but it is much better than on

admission.  She is scheduled for hemodialysis today, following which she can be

discharged and continue with the outpatient dialysis.



PHYSICAL EXAMINATION:

On examination this morning, blood pressure was 152/69, heart rate 71 per minute.

Patient is afebrile.

EXAMINATION OF THE HEART: S1 and S2.

EXAMINATION OF LUNGS: Decreased breath sounds at bases.

ABDOMEN:  Soft, non-tender.

Examination of lower extremities shows no significant edema.

CNS exam is grossly intact.



LABS:

Labs from yesterday show sodium 132, potassium 4.1, BUN 43, serum creatinine 4.1 from

2/11/2020.



ASSESSMENT:

1. Acute kidney injury on top of chronic kidney disease, currently hemodialysis-

    dependent.  Patient will continue with hemodialysis as outpatient and we will

    continue to monitor for possible recovery of renal function.

2. Chronic kidney disease secondary to diabetic nephropathy, stage IV, with previous

    creatinine about 2.5.

3. Volume overload, now improved.

4. Hypertension.

5. Nausea, currently improved significantly, most likely from uremia.



PLAN:

Hemodialysis today, following which if patient feels well she can be discharged with

plans to follow up as outpatient on hemodialysis.





MMODL / IJN: [id number] / Job#: [id number]

## 2020-02-13 NOTE — P.DS
Providers


Date of admission: 


02/04/20 18:04





Expected date of discharge: 02/12/20


Attending physician: 


Rogelio Valle





Consults: 





                                        





02/04/20 18:04


Consult Physician Routine 


   Consulting Provider: Emilia Estrada


   Consult Reason/Comments: known


   Do you want consulting provider notified?: Yes





02/08/20 17:17


Consult Physician Stat 


   Consulting Provider: Justin Wang


   Consult Reason/Comments: dialysis cath


   Do you want consulting provider notified?: Yes











Primary care physician: 


Esau Ashraf





[facility] Course: 





Chief Complaint: Tired





History of presenting complaint:


This is a pleasant 74 a patient of Dr. Ashraf.  Chronic stable medical 

conditions include hypertension, hyperlipidemia, depression, anxiety, 

osteoarthritis.  Patient about a week ago felt that she cannot really speak felt

out of sorts.  EMS was called out.  Follow Accu-Cheks to be 43.  Received 

glucose and felt better.  She did not go to the hospital.  Since then she been 

having nausea vomiting and predominantly throwing up.  She went to see a pain 

specialist Dr. Roy and she was again incoherent of that.  Decided to send 

her to the ER.  No abdominal pain.  Has been having some chills.  In the ER 

found to be in acute renal failure.  Patient's baseline creatinine per 

nephrology rundown 2.2.


Hospital course:


Admitted with-episodes of hypoglycemia, symptomatic, acute kidney injury likely 

ATN.  Oral hypoglycemics were discontinued.  Kidney function progressed.  

Patient symptomatic with uremic symptoms.  Decision made to proceed with 

hemodialysis after discussing with the patient.  Patient had 3 sessions of 

hemodialysis.  Did well.  Making urine.


Today-doing well.  Discussed at length with the patient.  Questions were 

answered.  Patient's friend will be driving her for dialysis initially.  Monday Wednesday Friday.  Discussed with nephrology.


Discussion and discharge planning more than 35 minutes





Consultation:


Dr. Estrada and colleagues for nephrology


Dr. Wang from vascular surgery for placing dialysis catheter





Physical examination:


VITAL SIGNS: 98.5, 69, 18, 141/57, 92% on room air


GENERAL: Propped up, comfortable


EYES: Pupils equal.  Conjunctiva normal.


HEENT: Right neck dialysis catheter.


NECK: JVD not raised; masses not palpable.


HEART: First and second heart sounds are normal;  no edema.  


LUNGS: Respiratory rate normal; clear to auscultation.  


ABDOMEN: Soft,  nontender, liver spleen not palpable, no masses palpable.  


PSYCH: Alert and oriented x3;  mood  and affect ariel


MUSCULOSKELETAL: Evidence of OA especially in the hands l.  











INVESTIGATIONS, reviewed in the clinical context:


Bun 43 creatinine 4.12


[id number]





Previous testing


White count 6.5 hemoglobin 11.2 platelets 293 pressure 5.7 bun 70 creatinine 

6.06


Baseline creatinine supposed to be 2.2


UA positive for leukoesterase, WBC


Urine drug screen positive for oxycodone


EKG tracing personally reviewed by me shows-sinus rhythm


Chest x-ray film personally reviewed by me-elevated right diaphragm, lung fields

are clear


Computed tomography scan of the brain-lung fields clear





Assessment:


-Diabetes mellitus type 2 uncontrolled with episodes of hypoglycemia, 

symptomatic, corrected.  Resumed on Glucotrol 2.5 mg daily, POA


-Hemodialysis catheter placed on February 9-.


-Acute kidney injury likely ATN with a contribution from patient being on ACE 

inhibitor Lasix, slow to improve, POA


-Chronic kidney disease stage IV from diabetic nephropathy, symptomatic with 

signs of uremia including weak tired nausea some vomiting


-Hyperlipidemia


-Essential hypertension, controlled


-Depression not otherwise


-Primary osteoarthritis


-Morbid obesity BMI 44.2


-Hyperkalemia secondary to acute kidney injury and patient be on ACE inhibitor, 

corrected, POA


-Hypomagnesemia


-Hyperphosphatemia





Disposition:


Home








Plan - Discharge Summary


New Discharge Prescriptions: 


New


   hydrALAZINE HCL [Apresoline] 50 mg PO TID #90 tab


   Repaglinide [Prandin] 0.5 mg PO AC-TID #90 tablet





Continue


   Calcitriol 0.5 mcg PO LARIOS


   Cholecalciferol [Vitamin D3 (25 Mcg = 1000 Iu)] 2,000 unit PO HS


   Citalopram Hydrobromide [CeleXA] 40 mg PO DAILY


   Cyanocobalamin (Vitamin B-12) [Vitamin B-12] 1,000 mcg PO HS


   Diltiazem HCl 120 mg PO BID


   Furosemide [Lasix] 20 mg PO DAILY


   Levothyroxine Sodium [Synthroid] 112 mcg PO DAILY


   Multivitamin [Multivitamins Adult Gummies] 1 tab PO HS


   oxyCODONE-APAP 10-325MG [Percocet  mg] 1 tab PO TID PRN


     PRN Reason: Pain


   Rosuvastatin Calcium [Crestor] 10 mg PO DAILY





Discontinued


   glipiZIDE [Glucotrol] 10 mg PO AC-BID


   Lisinopril [Zestril] 5 mg PO DAILY


Discharge Medication List





Calcitriol 0.5 mcg PO LARIOS 02/04/20 [History]


Cholecalciferol [Vitamin D3 (25 Mcg = 1000 Iu)] 2,000 unit PO HS 02/04/20 

[History]


Citalopram Hydrobromide [CeleXA] 40 mg PO DAILY 02/04/20 [History]


Cyanocobalamin (Vitamin B-12) [Vitamin B-12] 1,000 mcg PO HS 02/04/20 [History]


Diltiazem HCl 120 mg PO BID 02/04/20 [History]


Furosemide [Lasix] 20 mg PO DAILY 02/04/20 [History]


Levothyroxine Sodium [Synthroid] 112 mcg PO DAILY 02/04/20 [History]


Multivitamin [Multivitamins Adult Gummies] 1 tab PO HS 02/04/20 [History]


Rosuvastatin Calcium [Crestor] 10 mg PO DAILY 02/04/20 [History]


oxyCODONE-APAP 10-325MG [Percocet  mg] 1 tab PO TID PRN 02/04/20 [History]


Repaglinide [Prandin] 0.5 mg PO AC-TID #90 tablet 02/12/20 [Rx]


hydrALAZINE HCL [Apresoline] 50 mg PO TID #90 tab 02/12/20 [Rx]








Follow up Appointment(s)/Referral(s): 


Esau Ashraf DO [Primary Care Provider] - 1-2 days (Office is closed.  Please

call to schedule appointment)


James Garcia DO [STAFF PHYSICIAN] - 1 Week (Office is closed.  Please call to

schedule appointment)


Patient Instructions/Handouts:  Acute Kidney Injury (DC), Chronic Kidney Disease

(DC), Hemodialysis (DC)


Activity/Diet/Wound Care/Special Instructions: 


Monalisa Lyons [facility] for Dialysis MWF @2:30


Discharge Disposition: HOME SELF-CARE

## 2020-10-27 ENCOUNTER — HOSPITAL ENCOUNTER (OUTPATIENT)
Dept: HOSPITAL 47 - LABPAT | Age: 75
Discharge: HOME | End: 2020-10-27
Attending: SURGERY
Payer: MEDICARE

## 2020-10-27 DIAGNOSIS — N18.6: ICD-10-CM

## 2020-10-27 DIAGNOSIS — Z01.818: Primary | ICD-10-CM

## 2020-10-27 LAB
ANION GAP SERPL CALC-SCNC: 6 MMOL/L
BASOPHILS # BLD AUTO: 0 K/UL (ref 0–0.2)
BASOPHILS NFR BLD AUTO: 0 %
BUN SERPL-SCNC: 28 MG/DL (ref 7–17)
CHLORIDE SERPL-SCNC: 102 MMOL/L (ref 98–107)
CO2 SERPL-SCNC: 29 MMOL/L (ref 22–30)
EOSINOPHIL # BLD AUTO: 0.1 K/UL (ref 0–0.7)
EOSINOPHIL NFR BLD AUTO: 2 %
ERYTHROCYTE [DISTWIDTH] IN BLOOD BY AUTOMATED COUNT: 3.94 M/UL (ref 3.8–5.4)
ERYTHROCYTE [DISTWIDTH] IN BLOOD: 14.8 % (ref 11.5–15.5)
HCT VFR BLD AUTO: 38.5 % (ref 34–46)
HGB BLD-MCNC: 12.1 GM/DL (ref 11.4–16)
LYMPHOCYTES # SPEC AUTO: 0.9 K/UL (ref 1–4.8)
LYMPHOCYTES NFR SPEC AUTO: 18 %
MCH RBC QN AUTO: 30.6 PG (ref 25–35)
MCHC RBC AUTO-ENTMCNC: 31.3 G/DL (ref 31–37)
MCV RBC AUTO: 97.6 FL (ref 80–100)
MONOCYTES # BLD AUTO: 0.2 K/UL (ref 0–1)
MONOCYTES NFR BLD AUTO: 3 %
NEUTROPHILS # BLD AUTO: 3.9 K/UL (ref 1.3–7.7)
NEUTROPHILS NFR BLD AUTO: 76 %
PLATELET # BLD AUTO: 203 K/UL (ref 150–450)
POTASSIUM SERPL-SCNC: 4.9 MMOL/L (ref 3.5–5.1)
SODIUM SERPL-SCNC: 137 MMOL/L (ref 137–145)
WBC # BLD AUTO: 5.2 K/UL (ref 3.8–10.6)

## 2020-10-27 PROCEDURE — 80051 ELECTROLYTE PANEL: CPT

## 2020-10-27 PROCEDURE — 85025 COMPLETE CBC W/AUTO DIFF WBC: CPT

## 2020-10-27 PROCEDURE — 82565 ASSAY OF CREATININE: CPT

## 2020-10-27 PROCEDURE — 84520 ASSAY OF UREA NITROGEN: CPT

## 2020-10-27 PROCEDURE — 36415 COLL VENOUS BLD VENIPUNCTURE: CPT

## 2020-11-10 ENCOUNTER — HOSPITAL ENCOUNTER (OUTPATIENT)
Dept: HOSPITAL 47 - CATHCVL | Age: 75
Discharge: HOME | End: 2020-11-10
Attending: SURGERY
Payer: MEDICARE

## 2020-11-10 VITALS — HEART RATE: 90 BPM | RESPIRATION RATE: 16 BRPM | DIASTOLIC BLOOD PRESSURE: 98 MMHG | SYSTOLIC BLOOD PRESSURE: 190 MMHG

## 2020-11-10 VITALS — BODY MASS INDEX: 39.8 KG/M2

## 2020-11-10 VITALS — TEMPERATURE: 97.3 F

## 2020-11-10 DIAGNOSIS — Z79.899: ICD-10-CM

## 2020-11-10 DIAGNOSIS — N18.6: ICD-10-CM

## 2020-11-10 DIAGNOSIS — T82.590A: Primary | ICD-10-CM

## 2020-11-10 DIAGNOSIS — Z88.6: ICD-10-CM

## 2020-11-10 DIAGNOSIS — Z98.890: ICD-10-CM

## 2020-11-10 DIAGNOSIS — Z79.890: ICD-10-CM

## 2020-11-10 DIAGNOSIS — Z90.49: ICD-10-CM

## 2020-11-10 DIAGNOSIS — Z90.89: ICD-10-CM

## 2020-11-10 DIAGNOSIS — Z99.2: ICD-10-CM

## 2020-11-10 DIAGNOSIS — Z79.01: ICD-10-CM

## 2020-11-10 DIAGNOSIS — Z79.891: ICD-10-CM

## 2020-11-10 LAB
GLUCOSE BLD-MCNC: 84 MG/DL (ref 75–99)
GLUCOSE BLD-MCNC: 99 MG/DL (ref 75–99)

## 2020-11-10 PROCEDURE — 76080 X-RAY EXAM OF FISTULA: CPT

## 2020-11-10 PROCEDURE — 36901 INTRO CATH DIALYSIS CIRCUIT: CPT

## 2020-11-10 NOTE — P.OP
Date of Procedure: 11/10/20


Description of Procedure: 


Preoperative diagnosis: End-stage renal disease, malfunctioning fistula


Postoperative diagnosis: Same, widely patent normal fistula


Procedure: #1 ultrasound guided right upper extremity cephalic vein access


#2 right upper extremity fistulogram


#3 moderate conscious sedation 10 minutes with Versed and fentanyl


Surgeon: Sanjuanita Rashid D.O.


EBL: Less than 5 mL


IV fluids: Not measured


Urine output: Not measured


Drains: None


Complications: None immediately apparent


Condition: Stable to recovery


Operative indication and findings: The patient is a 75-year-old female with a 

previously placed right upper extremity brachiocephalic fistula creation.  They 

have been having some issues with filtrating and areas of vein too deep for 

access.  She had an ultrasound which did reveal some areas of different 

velocities possibly consistent with stenoses therefore a fistulogram was recom

mended.  She presents today for this.  Risks and benefits were discussed.  She 

seemingly understood and was willing to proceed as such


Procedure in detail: The right upper extremity was prepped and draped in usual 

sterile fashion.  A preprocedure timeout was performed, all parties were in 

agreement.  The ultrasound was utilized in the skin overlying the fistula was 

identified.  1% lidocaine plain was used to infiltrate.  A micropuncture needle 

was used and the fistula was cannulated.  Micro-access sheath was placed via 

Seldinger technique.  A fistulogram was performed revealing a widely patent 

fistula with good flow and clearance.  Pressure was held on the outflow and a 

fistula gram was again performed evaluating the inflow.  The artery itself was 

not well visualized but the anastomosis appeared patent without a significant 

areas of stenosis.  There was good adequate flushing through the fistula with 

adequate flow.  The catheter was removed and hemostasis was obtained with manual

pressure.  The patient allowed to return to recovery in stable condition having 

tolerated the procedure well.





Plan - Discharge Summary


Discharge Rx Participant: Yes


New Discharge Prescriptions: 


No Action


   Furosemide [Lasix] 40 mg PO DAILY


   Levothyroxine Sodium [Synthroid] 112 mcg PO DAILY


   oxyCODONE-APAP 10-325MG [Percocet  mg] 1 tab PO TID


   Rosuvastatin Calcium [Crestor] 10 mg PO DAILY


   Repaglinide [Prandin] 0.5 mg PO AC-TID #90 tablet


   Metoprolol Tartrate [Lopressor] 25 mg PO SUTUTHSA


   Apixaban [Eliquis] 2.5 mg PO BID


   Albuterol Inhaler [Ventolin Hfa Inhaler] 2 puff INHALATION RT-QID PRN


     PRN Reason: Asthma


   Vitamin B Complex 1 each PO DAILY


   Calcium Acetate 667 mg PO TID


Discharge Medication List





Furosemide [Lasix] 40 mg PO DAILY 02/04/20 [History]


Levothyroxine Sodium [Synthroid] 112 mcg PO DAILY 02/04/20 [History]


Rosuvastatin Calcium [Crestor] 10 mg PO DAILY 02/04/20 [History]


oxyCODONE-APAP 10-325MG [Percocet  mg] 1 tab PO TID 02/04/20 [History]


Repaglinide [Prandin] 0.5 mg PO AC-TID #90 tablet 02/12/20 [Rx]


Albuterol Inhaler [Ventolin Hfa Inhaler] 2 puff INHALATION RT-QID PRN 11/09/20 

[History]


Apixaban [Eliquis] 2.5 mg PO BID 11/09/20 [History]


Calcium Acetate 667 mg PO TID 11/09/20 [History]


Metoprolol Tartrate [Lopressor] 25 mg PO SUTUTHSA 11/09/20 [History]


Vitamin B Complex 1 each PO DAILY 11/09/20 [History]








Follow up Appointment(s)/Referral(s): 


Moses Child DO [STAFF PHYSICIAN] - 2 Weeks


Activity/Diet/Wound Care/Special Instructions: 


Resuming regular activities.


Discharge Disposition: HOME SELF-CARE

## 2020-11-10 NOTE — IR
EXAMINATION TYPE: IR fistula/abscess/sinus tract

 

DATE OF EXAM: 11/10/2020

 

COMPARISON: NONE

 

HISTORY: Fluoroscopy time.

 

Fluoroscopy was provided to the referring clinician.

## 2021-04-20 ENCOUNTER — HOSPITAL ENCOUNTER (OUTPATIENT)
Dept: HOSPITAL 47 - RADMAMWWP | Age: 76
Discharge: HOME | End: 2021-04-20
Attending: FAMILY MEDICINE
Payer: MEDICARE

## 2021-04-20 DIAGNOSIS — Z12.31: Primary | ICD-10-CM

## 2021-04-20 PROCEDURE — 77067 SCR MAMMO BI INCL CAD: CPT

## 2021-04-20 PROCEDURE — 77063 BREAST TOMOSYNTHESIS BI: CPT

## 2021-04-21 NOTE — MM
Reason for exam: screening  (asymptomatic).

Last mammogram was performed 1 year and 3 months ago.



History:

Patient is postmenopausal and history of other cancer.



Physical Findings:

A clinical breast exam by your physician is recommended on an annual basis and 

results should be correlated with mammographic findings.



MG 3D Screening Mammo W/Cad

Bilateral CC and MLO view(s) were taken.

Prior study comparison: January 31, 2020, bilateral MG 3d screening mammo w/cad.  

January 15, 2019, bilateral MG 3d screening mammo w/cad.

There are scattered fibroglandular densities.  There is no discrete abnormality.  

No significant changes when compared with prior studies.





ASSESSMENT: Negative, BI-RAD 1



RECOMMENDATION:

Routine screening mammogram of both breasts in 1 year.

## 2021-04-22 ENCOUNTER — HOSPITAL ENCOUNTER (OUTPATIENT)
Dept: HOSPITAL 47 - RADUSWWP | Age: 76
Discharge: HOME | End: 2021-04-22
Attending: FAMILY MEDICINE
Payer: MEDICARE

## 2021-04-22 DIAGNOSIS — I65.23: Primary | ICD-10-CM

## 2021-04-22 PROCEDURE — 93880 EXTRACRANIAL BILAT STUDY: CPT

## 2021-04-22 NOTE — US
EXAMINATION TYPE: US carotid duplex BILAT

 

DATE OF EXAM: 4/22/2021

 

COMPARISON: Carotid ultrasound February 6, 2020

 

CLINICAL HISTORY: R41.3 OTHER AMNESIA.

 

EXAM MEASUREMENTS: 

 

RIGHT:  Peak Systolic Velocity (PSV) cm/sec

----- Right CCA:  52.9  

----- Right ICA:  70.0     

----- Right ECA:  52.9   

ICA/CCA ratio:  1.3    

 

RIGHT:  End Diastole cm/sec

----- Right CCA:  13.6.   

----- Right ICA:  18.0      

----- Right ECA:  0.0    

 

LEFT:  Peak Systolic Velocity (PSV) cm/sec

----- Left CCA:  62.2  

----- Left ICA:  69.3   

----- Left ECA:  52.3  

ICA/CCA ratio:  1.1  

 

LEFT:  End Diastole cm/sec

----- Left CCA:  7.9

----- Left ICA:  22.9   

----- Left ECA:  0.0

 

VERTEBRALS (direction of flow):

Right Vertebral: Antegrade

Left Vertebral: Antegrade

 

Rhythm:  Normal

 

 

Gray scale images show mild eccentric hyperechoic peripheral plaque at bilateral carotid bulb level.

 

IMPRESSION:  Mild bilateral plaque redemonstrated. No hemodynamically significant stenosis seen in ei
ther internal carotid artery.   

 

 

Criteria for Assigning % of Stenosis / Diameter reduction

(Estimation based on the indirect measurements of the internal carotid artery velocities (ICA PSV).

1.  Normal (no stenosis)=ICA PSV < 125 cm/s: ratio < 2.0: ICA EDV<40 cm/s.

2. Less than 50% stenosis=ICA PSV < 125 cm/s: ratio < 2.0: ICA EDV<40 cm/s.

3.  50 to 69% stenosis=ICA PSV of 125 to 230 cm/s: ration 2.0 ? 4.0: ICA EDV  cm/s.

4.  Greater than 70% stenosis to near occlusion= ICA PSV > 230 cm/s: ratio > 4.0: ICA EDV > 100 cm/s.
 

5.  Near occlusion= ICA PSV velocities may be low or undetectable: variable ratio and ICA EDV.

6.  Total occlusion=unable to detect flow.

## 2021-04-27 ENCOUNTER — HOSPITAL ENCOUNTER (OUTPATIENT)
Dept: HOSPITAL 47 - LABWHC1 | Age: 76
Discharge: HOME | End: 2021-04-27
Attending: SURGERY
Payer: MEDICARE

## 2021-04-27 DIAGNOSIS — E21.1: ICD-10-CM

## 2021-04-27 DIAGNOSIS — K90.89: ICD-10-CM

## 2021-04-27 DIAGNOSIS — E55.9: ICD-10-CM

## 2021-04-27 DIAGNOSIS — E66.01: ICD-10-CM

## 2021-04-27 DIAGNOSIS — N19: Primary | ICD-10-CM

## 2021-04-27 DIAGNOSIS — K50.90: ICD-10-CM

## 2021-04-27 DIAGNOSIS — D50.8: ICD-10-CM

## 2021-04-27 DIAGNOSIS — K74.1: ICD-10-CM

## 2021-04-27 LAB
ERYTHROCYTE [DISTWIDTH] IN BLOOD BY AUTOMATED COUNT: 3.92 X 10*6/UL (ref 4.1–5.2)
ERYTHROCYTE [DISTWIDTH] IN BLOOD: 13.2 % (ref 11.5–14.5)
HBA1C MFR BLD: 5.7 % (ref 4–6)
HCT VFR BLD AUTO: 38.1 % (ref 37.2–46.3)
HGB BLD-MCNC: 12 G/DL (ref 12–15)
MCH RBC QN AUTO: 30.6 PG (ref 27–32)
MCHC RBC AUTO-ENTMCNC: 31.5 G/DL (ref 32–37)
MCV RBC AUTO: 97.2 FL (ref 80–97)
PLATELET # BLD AUTO: 239 X 10*3/UL (ref 140–440)
WBC # BLD AUTO: 6.62 X 10*3/UL (ref 4.5–10)

## 2021-04-27 PROCEDURE — 82607 VITAMIN B-12: CPT

## 2021-04-27 PROCEDURE — 83550 IRON BINDING TEST: CPT

## 2021-04-27 PROCEDURE — 83540 ASSAY OF IRON: CPT

## 2021-04-27 PROCEDURE — 85027 COMPLETE CBC AUTOMATED: CPT

## 2021-04-27 PROCEDURE — 82728 ASSAY OF FERRITIN: CPT

## 2021-04-27 PROCEDURE — 82525 ASSAY OF COPPER: CPT

## 2021-04-27 PROCEDURE — 83036 HEMOGLOBIN GLYCOSYLATED A1C: CPT

## 2021-04-27 PROCEDURE — 84134 ASSAY OF PREALBUMIN: CPT

## 2021-04-27 PROCEDURE — 80053 COMPREHEN METABOLIC PANEL: CPT

## 2021-04-27 PROCEDURE — 84425 ASSAY OF VITAMIN B-1: CPT

## 2021-04-27 PROCEDURE — 84630 ASSAY OF ZINC: CPT

## 2021-04-27 PROCEDURE — 84443 ASSAY THYROID STIM HORMONE: CPT

## 2021-04-27 PROCEDURE — 83735 ASSAY OF MAGNESIUM: CPT

## 2021-04-27 PROCEDURE — 80061 LIPID PANEL: CPT

## 2021-04-27 PROCEDURE — 85730 THROMBOPLASTIN TIME PARTIAL: CPT

## 2021-04-27 PROCEDURE — 85610 PROTHROMBIN TIME: CPT

## 2021-04-27 PROCEDURE — 84100 ASSAY OF PHOSPHORUS: CPT

## 2021-04-27 PROCEDURE — 82306 VITAMIN D 25 HYDROXY: CPT

## 2021-04-27 PROCEDURE — 84255 ASSAY OF SELENIUM: CPT

## 2021-04-27 PROCEDURE — 84590 ASSAY OF VITAMIN A: CPT

## 2021-04-27 PROCEDURE — 82746 ASSAY OF FOLIC ACID SERUM: CPT

## 2021-04-27 PROCEDURE — 83970 ASSAY OF PARATHORMONE: CPT

## 2021-04-27 PROCEDURE — 36415 COLL VENOUS BLD VENIPUNCTURE: CPT

## 2021-04-28 LAB
ALBUMIN SERPL-MCNC: 4.3 G/DL (ref 3.8–4.9)
ALBUMIN/GLOB SERPL: 1.87 G/DL (ref 1.6–3.17)
ALP SERPL-CCNC: 197 U/L (ref 41–126)
ALT SERPL-CCNC: 25 U/L (ref 8–44)
ANION GAP SERPL CALC-SCNC: 11.8 MMOL/L (ref 4–12)
APTT BLD: 35.5 SEC (ref 23.5–31)
AST SERPL-CCNC: 33 U/L (ref 13–35)
BUN SERPL-SCNC: 23 MG/DL (ref 9–27)
BUN/CREAT SERPL: 8.52 RATIO (ref 12–20)
CALCIUM SPEC-MCNC: 9.1 MG/DL (ref 8.7–10.3)
CHLORIDE SERPL-SCNC: 98 MMOL/L (ref 96–109)
CHOLEST SERPL-MCNC: 131 MG/DL (ref 0–200)
CO2 SERPL-SCNC: 29.2 MMOL/L (ref 21.6–31.8)
FERRITIN SERPL-MCNC: 1559.4 NG/ML (ref 10–291)
GLOBULIN SER CALC-MCNC: 2.3 G/DL (ref 1.6–3.3)
GLUCOSE SERPL-MCNC: 176 MG/DL (ref 70–110)
HDLC SERPL-MCNC: 48 MG/DL (ref 40–60)
INR PPP: 1.97 (ref 0.9–1.11)
IRON SERPL-MCNC: 55 UG/DL (ref 50–170)
LDLC SERPL CALC-MCNC: 49 MG/DL (ref 0–131)
MAGNESIUM SPEC-SCNC: 1.9 MG/DL (ref 1.5–2.4)
POTASSIUM SERPL-SCNC: 4.2 MMOL/L (ref 3.5–5.5)
PREALB SERPL-MCNC: 29 MG/DL (ref 18–42)
PROT SERPL-MCNC: 6.6 G/DL (ref 6.2–8.2)
PT BLD: 20.5 SEC (ref 9.9–11.9)
SODIUM SERPL-SCNC: 139 MMOL/L (ref 135–145)
TIBC SERPL-MCNC: 298 UG/DL (ref 228–460)
TRIGL SERPL-MCNC: 170 MG/DL (ref 0–149)
VIT B12 SERPL-MCNC: 306 PG/ML (ref 200–944)
VLDLC SERPL CALC-MCNC: 34 MG/DL (ref 5–40)

## 2021-04-29 LAB — VIT B1 BLD-MCNC: 103 UG/L (ref 38–122)

## 2021-04-30 LAB — ZINC SERPL-MCNC: 62 UG/DL (ref 60–130)

## 2021-05-05 ENCOUNTER — HOSPITAL ENCOUNTER (OUTPATIENT)
Dept: HOSPITAL 47 - RADCTMAIN | Age: 76
Discharge: HOME | End: 2021-05-05
Attending: SURGERY
Payer: MEDICARE

## 2021-05-05 DIAGNOSIS — K21.9: Primary | ICD-10-CM

## 2021-05-05 DIAGNOSIS — Z98.84: ICD-10-CM

## 2021-05-05 DIAGNOSIS — K22.4: ICD-10-CM

## 2021-05-05 PROCEDURE — 74176 CT ABD & PELVIS W/O CONTRAST: CPT

## 2021-05-05 PROCEDURE — 74220 X-RAY XM ESOPHAGUS 1CNTRST: CPT

## 2021-05-05 NOTE — CT
EXAMINATION TYPE: CT abdomen pelvis wo con

 

DATE OF EXAM: 5/5/2021

 

HISTORY: H/O small bowel obstruction; , history of gastric bypass surgery long time ago with recurren
t pain and vomiting

 

CT DLP: 1423.70 mGycm.  Automated Exposure Control for Dose Reduction was Utilized.

 

TECHNIQUE:  CT scan of the abdomen and pelvis is performed without oral or IV contrast.

 

COMPARISON: Chest x-ray February 4, 2020

 

FINDINGS:  Within the limitations of a non-contrast study, the following observations are made.

 

LUNG BASES: Elevated right hemidiaphragm redemonstrated.

 

LIVER/GB: Cholecystectomy clips. Mild extrahepatic biliary dilatation up to 15 mm coronal image 55. M
ild-to-moderate central intrahepatic delivery dilatation noted. Correlation with old outside imaging 
would be beneficial to further assess.

 

PANCREAS: No significant abnormality is seen.

 

SPLEEN: Few punctate calcifications scattered throughout the spleen..

 

ADRENALS: No significant abnormality is seen.

 

KIDNEYS: Cortical thinning and small size to both kidneys. There is 2.3 cm thin-walled cyst laterally
 right kidney coronal image 66. No hydronephrosis seen bilaterally.

 

 BOWEL: Surgical changes epigastric region from gastric bypass procedure. No suspicious small or larg
e bowel dilatation. Diverticula in the left and sigmoid colon. No CT evidence for acute diverticuliti
s. Prominent bowel loop at site of surgical anastomosis left upper to mid abdomen. Suboptimal evaluat
ion without enteric contrast. Rounded 3.0 cm lesion upper to mid abdomen just left of midline axial i
mage 37 could reflect focal prominent bowel loop with difficult to connect to adjacent bowel loops, m
esenteric mass or adenopathy not excluded.

 

GENITAL ORGANS: Uterus surgically absent or markedly atrophic. Scattered bilateral pelvic phleboliths
.

 

LYMPH NODES: No greater than 1cm abdominal or pelvic lymph nodes are appreciated.

 

OSSEOUS STRUCTURES: Moderate axial joint space loss both hips. Straightening of spine on sagittal marciano
ges with moderate to severe disc space narrowing and vacuum disc phenomenon with endplate sclerosis L
2-L3 level. Moderate disc space narrowing and vacuum disc phenomenon L3-L4 level. Mild disc space benjamin
rowing and vacuum disc phenomenon L5-S1 level.

 

OTHER: Mild calcified plaque of the aorta extends into branch vessels. Small fat-containing umbilical
 hernia

 

IMPRESSION:

1. Gastric bypass changes. Overall nonobstructive bowel gas pattern. There is 3.0 cm left upper to mi
d abdominal round lesion, advise further investigation with repeat CT with oral and IV contrast to ru
le out mesenteric mass or adenopathy.

2. Mild to moderate central intrahepatic and intrahepatic biliary dilatation out of proportion to elidia
pected after cholecystectomy. Correlate clinically and with liver lab values. Correlation with old ou
tside imaging would be beneficial to determine need for further investigation by ERCP/MRCP.

2.

## 2021-05-05 NOTE — FL
EXAMINATION TYPE: FL barium swallow

 

DATE OF EXAM: 5/5/2021

 

CLINICAL HISTORY: History of gastric bypass over 10 years ago with recurrent vomiting and dysphasia o
pallavi last several months

 

TECHNIQUE:  A double contrast esophagram is performed utilizing air and barium.  A total of 43 second
s of fluoroscopic time was utilized during procedure and 36 images obtained

 

COMPARISON: Same day CT abdomen and pelvis study

 

FINDINGS: The esophagus shows the initial satisfactory motility and emptying into the gastric remnant
. With additional drinking there is incomplete emptying of the esophagus with episodes of abnormal se
condary and tertiary contractions. There is then contrast up above the subcarinal level. No evidence 
of hiatal hernia or esophageal stricture noted. There is flow of contrast into anastomotic small terrie
l loop but also some reflux into the esophagus. Cholecystectomy clips incidentally noted.

 

IMPRESSION: Gastric bypass changes.  Moderate esophageal dysmotility and moderate gastroesophageal re
flux.

## 2021-06-10 ENCOUNTER — HOSPITAL ENCOUNTER (OUTPATIENT)
Dept: HOSPITAL 47 - BARWHC3 | Age: 76
End: 2021-06-10
Attending: SURGERY
Payer: MEDICARE

## 2021-06-10 VITALS — BODY MASS INDEX: 42.3 KG/M2

## 2021-06-10 VITALS
RESPIRATION RATE: 16 BRPM | TEMPERATURE: 97.5 F | DIASTOLIC BLOOD PRESSURE: 82 MMHG | SYSTOLIC BLOOD PRESSURE: 191 MMHG | HEART RATE: 73 BPM

## 2021-06-10 DIAGNOSIS — Z79.891: ICD-10-CM

## 2021-06-10 DIAGNOSIS — J44.9: ICD-10-CM

## 2021-06-10 DIAGNOSIS — E78.5: ICD-10-CM

## 2021-06-10 DIAGNOSIS — M17.0: ICD-10-CM

## 2021-06-10 DIAGNOSIS — Z79.899: ICD-10-CM

## 2021-06-10 DIAGNOSIS — G89.4: ICD-10-CM

## 2021-06-10 DIAGNOSIS — I11.9: ICD-10-CM

## 2021-06-10 DIAGNOSIS — E66.01: Primary | ICD-10-CM

## 2021-06-10 DIAGNOSIS — Z79.01: ICD-10-CM

## 2021-06-10 DIAGNOSIS — E03.9: ICD-10-CM

## 2021-06-10 DIAGNOSIS — C55: ICD-10-CM

## 2021-06-10 DIAGNOSIS — Z99.2: ICD-10-CM

## 2021-06-10 DIAGNOSIS — M79.3: ICD-10-CM

## 2021-06-10 DIAGNOSIS — I83.90: ICD-10-CM

## 2021-06-10 DIAGNOSIS — R13.10: ICD-10-CM

## 2021-06-10 DIAGNOSIS — N18.6: ICD-10-CM

## 2021-06-10 DIAGNOSIS — Z12.11: ICD-10-CM

## 2021-06-10 PROCEDURE — 99211 OFF/OP EST MAY X REQ PHY/QHP: CPT

## 2021-06-10 NOTE — P.HPBAR
Bariatric H&P





- History & Physicial


H&P Date: 06/10/21


History & Physicial: 


Visit/CC: 





Patient initial contact: 





Initial weight: 


Initial weight in pounds: 


Height: 


Initial BMI: 





Last weight: 





Current weight: 


Current weight in pounds: 


Current BMI: 





Ideal body weight (based on NIH guidelines): 





Excess body weight loss: 








The patient is a 75 year-old F who presents for Bariatric Assessment.








DATE OF SERVICE: 06/10/2021





REASON FOR CONSULTATION: Panniculitis





HISTORY OF PRESENT ILLNESS:  Brenda Lynn is a 75-year-old female who comes 

with lifelong morbid obesity. Her highest weight was 362 pounds.  She lost over 

100 pounds after having a gastric bypass 20+ years ago.  She comes in with 

moderate-sized symptomatic pannus.  She reports activities of daily living is 

impaired from her pannus.  She sees a nephrologist.  She had recent laboratory 

work demonstrating severe copper deficiency.  She also takes chronic blood 

thinners.  She has tried local treatments for her panniculitis over 5 years with

minimal improvement.  Additional, she reports dysphagia. She reports no prior 

colonoscopy screening. 





At height of 5 feet 3 inches, her ideal body weight is 140 pounds.  Her highest 

weight is 362 pounds, BMI 64.3. She comes in 239 pounds.  Her body mass index is

42.4.  She is 99 pounds overweight. Lifetime weight loss of 123 pounds. Lifetime

percent excess weight loss of 56%. 





PAST MEDICAL HISTORY: 


1.  Morbid obesity due to excess calories


2.  Body mass index of 64.3, initial


3.  End stage renal disease, Stage 5 on dialysis, M, W, F


4.  Hypothyroidism


5.  Hyperlipidemia


6.  Chronic pain syndrome


7.  Chronic obstructive pulmonary disease with asthma


8.  Hypertensive heart disease


9.  Osteoarthritis bilateral knees. 


10.  Uterine cancer


11.  Varicose veins


12.  Panniculitis





PAST SURGICAL HISTORY: 


1.  Bilateral knee replacement


2.  Right foot surgery


3.  Cholecystectomy


4.  Hysterectomy


5.  Gastric bypass


6.  Vascular shunt





HOME MEDICATIONS: 


                                Home Medications











 Medication  Instructions  Recorded  Confirmed


 


Furosemide [Lasix] 40 mg PO DAILY 02/04/20 08/27/21


 


Levothyroxine Sodium [Synthroid] 112 mcg PO DAILY 02/04/20 08/27/21


 


Rosuvastatin Calcium [Crestor] 10 mg PO DAILY 02/04/20 08/27/21


 


oxyCODONE-APAP 10-325MG [Percocet 1 tab PO TID 02/04/20 08/27/21





 mg]   


 


Albuterol Inhaler [Ventolin Hfa 2 puff INHALATION RT-QID PRN 11/09/20 08/27/21





Inhaler]   


 


Calcium Acetate 1,334 mg PO TID 11/09/20 08/27/21


 


Metoprolol Tartrate [Lopressor] 25 mg PO DAILY 11/09/20 08/27/21


 


Iron Injections 1 dose IM MOWEFR 06/21/21 08/27/21


 


amLODIPine [Norvasc] 10 mg PO HS 06/21/21 08/27/21


 


tiZANidine [Zanaflex] 4 mg PO HS 06/21/21 08/27/21


 


Ergocalciferol [Vitamin D2 (1250 1,250 mcg PO MO 08/27/21 08/27/21





Mcg = 33799 Iu)]   


 


Lidocaine-Prilocaine Cream [Emla 1 applic TOPICAL AS DIRECTED PRN 08/27/21 08/27/21





Cream 2.5%/2.5%]   


 


SILVER sulfADIAZINE CREAM 1 applic TOPICAL BID 08/27/21 08/27/21





[Silvadene Cream]   


 


Warfarin [Coumadin] 6 mg PO HS 08/27/21 08/27/21


 


amLODIPine [Norvasc] 10 mg PO HS 08/27/21 08/27/21


 


lisinopriL [Zestril] 5 mg PO HS 08/27/21 08/27/21


 


tiZANidine [Zanaflex] 4 mg PO HS 08/27/21 08/27/21








                                  Previous Rx's











 Medication  Instructions  Recorded


 


Repaglinide [Prandin] 0.5 mg PO AC-TID #90 tablet 02/12/20














ALLERGIES:


                                    Allergies











Allergy/AdvReac Type Severity Reaction Status Date / Time


 


naproxen [From Naprosyn] Allergy  Dyspnea Verified 08/27/21 13:08

















SOCIAL HISTORY: Past tobacco use.   





FAMILY HISTORY: No family history of ulcerative colitis disease or Crohn's 

disease. Family history of morbid obesity. No lupus in the family.  No reports 

of stomach or esophageal cancer.  Diabetes and hypertension in the family. 





REVIEW OF ORGAN SYSTEMS: 


CONSTITUTIONAL: At height of 5 feet 3 inches, her ideal body weight is 140 

pounds.  Her highest weight is 362 pounds, BMI 64.3. She comes in 239 pounds.  

Her body mass index is 42.4.  She is 99 pounds overweight. Lifetime weight loss 

of 123 pounds. Lifetime percent excess weight loss of 56%. 


HEENT: Denies any active troubles with hearing.  She wears glasses. 


ENDOCRINE: Denies diabetes. No hypothyroidism. 


CARDIOVASCULAR: Past reports of palpitations or heart attacks or chest pain.  

Has hypertensive heart disease.


RESPIRATORY: Has asthma. Has chronic obstructive pulmonary disease.


GASTROINTESTINAL: Denies any bright red blood per rectum.  No diarrhea. No 

constipation. 


GENITOURINARY: Past hysterectomy. No recent blood in urine. End stage 5 renal 

disease, dialysis dependent M, W, F.


MUSCULOSKELETAL: Has lower back pain and joint pain.  Has osteoarthritis of the 

knees.  


NEURO: No headaches. No seizure disorders. Has chronic pain. 


PSYCH: Denies depression. No suicidal ideation.   


RHEUMATOLOGIC: No lupus.  No rheumatoid arthritis.  


HEMATOLOGIC: Has abnormal bleeding or bruising.  On blood thinners.


SKIN: Has panniculitis.  No skin cancer.





PHYSICAL EXAM: 


VITAL SIGNS: Height 5 foot 3 inches, weight 239 pounds. BMI 42.4


                                   Vital Signs











Temp  97.5 F L  06/10/21 14:29


 


Pulse  73   06/10/21 14:29


 


Resp  16   06/10/21 14:29


 


BP  191/82   06/10/21 14:29


 


Pulse Ox      














GENERAL: Well-developed in no acute distress.  


HEENT: No scleral icterus.  Extraocular movements grossly intact.  Hears 

conversational speech.  No nasal drainage.


NECK: Supple without lymphadenopathy. 


CHEST: Nonlabored respirations with equal bilateral excursions. 


CARDIOVASCULAR: Regular rate and regular rhythm. Distal 2+ pulses. 


ABDOMEN: Obese, soft, nontender, nondistended. Pannus extends to the lower 

thigh. Grade 4 panniculosis.  Pannus over 20 pounds.  


MUSCULOSKELETAL: No clubbing, cyanosis. 


NEURO: No focal or lateralizing signs. Cranial nerves 2 through 12 grossly 

within normal limits. 


PSYCH: Appropriate affect. Alert and oriented to person, place and time.


SKIN: Good skin turgor.  Well perfused. Bruising noted along extremities.





LABS: 





ASSESSMENT: 


1.  Morbid obesity due to excess calories


2.  Body mass index of 64.3, initial to 42.4


3.  End stage renal disease, Stage 5 on dialysis, M, W, F


4.  Hypothyroidism


5.  Hyperlipidemia


6.  Chronic pain syndrome


7.  Chronic obstructive pulmonary disease with asthma


8.  Hypertensive heart disease


9.  Osteoarthritis bilateral knees. 


10.  Uterine cancer


11.  Varicose veins


12.  Panniculitis


13.  Dysphagia


14.  Colonoscopy screening.





PLAN: 


1.  Nystatin powder recommended for panniculitis.  


2.  Recommend dermatologist assessment.  


3.  Recommend correction of all nutritional deficiencies advised.  


4.  Dietary surveillance and counseling was reviewed. Increased protein intake 

over 65 grams daily advised.


5.  Will need cardiac risk assessment.


6.  Recommend medical risk assessment.


7.  Recommend 12-lead EKG.


8.  Recommend upper endoscopy and colonoscopy. She is elevated risk with blood 

thinners and dialysis dependent.





Thank you for this consultation.








Past Medical History


Past Medical History: Asthma, Cancer, Diabetes Mellitus, Hyperlipidemia, 

Hypertension, Osteoarthritis (OA), Renal Disease, Thyroid Disorder


Additional Past Medical History / Comment(s): Hx Uterine cancer 25 yrs ago, 

Dialysis Mon, Wed and Fri, varicose veins.


History of Any Multi-Drug Resistant Organisms: None Reported


Past Surgical History: Bariatric Surgery, Cholecystectomy, Hysterectomy, Joint 

Replacement


Additional Past Surgical History / Comment(s): Bilateral knee and right foot 

surgery.


Past Anesthesia/Blood Transfusion Reactions: No Reported Reaction


Past Psychological History: No Psychological Hx Reported


Smoking Status: Former smoker


Past Alcohol Use History: None Reported


Additional Past Alcohol Use History / Comment(s): Quit smoking 30+ yrs ago.


Past Drug Use History: None Reported





- Past Family History


  ** Mother


Family Medical History: Cancer, Diabetes Mellitus, Hypertension





  ** Father


Family Medical History: Diabetes Mellitus, Hypertension





  ** Sister(s)


Family Medical History: Cancer, Diabetes Mellitus





Bariatric Checklist


Checklist: 


Plan: 





Checklist: 





EGD: 


1. Hiatal hernia: 


2. H. Pylori: 





HgbA1c: 





Vitamin D: 





Smoking: Former smoker





Primary care physician referral: 





Psychiatry clearance: 





Cardiology clearance: 





Sleep study: 





Diet journal: 





VTE risk score: 





VTE risk level: 





Rehab needs at discharge:

## 2021-06-24 ENCOUNTER — HOSPITAL ENCOUNTER (OUTPATIENT)
Dept: HOSPITAL 47 - ORWHC2ENDO | Age: 76
Discharge: HOME | End: 2021-06-24
Attending: SURGERY
Payer: MEDICARE

## 2021-06-24 VITALS — HEART RATE: 68 BPM | DIASTOLIC BLOOD PRESSURE: 70 MMHG | SYSTOLIC BLOOD PRESSURE: 173 MMHG

## 2021-06-24 VITALS — RESPIRATION RATE: 16 BRPM | TEMPERATURE: 98.3 F

## 2021-06-24 DIAGNOSIS — Z79.899: ICD-10-CM

## 2021-06-24 DIAGNOSIS — K64.8: ICD-10-CM

## 2021-06-24 DIAGNOSIS — E11.9: ICD-10-CM

## 2021-06-24 DIAGNOSIS — K22.8: ICD-10-CM

## 2021-06-24 DIAGNOSIS — M19.90: ICD-10-CM

## 2021-06-24 DIAGNOSIS — E78.5: ICD-10-CM

## 2021-06-24 DIAGNOSIS — K57.90: ICD-10-CM

## 2021-06-24 DIAGNOSIS — Z99.2: ICD-10-CM

## 2021-06-24 DIAGNOSIS — Z12.11: Primary | ICD-10-CM

## 2021-06-24 DIAGNOSIS — Z85.42: ICD-10-CM

## 2021-06-24 DIAGNOSIS — Z98.84: ICD-10-CM

## 2021-06-24 DIAGNOSIS — D12.4: ICD-10-CM

## 2021-06-24 DIAGNOSIS — K21.00: ICD-10-CM

## 2021-06-24 DIAGNOSIS — D12.2: ICD-10-CM

## 2021-06-24 DIAGNOSIS — Z79.01: ICD-10-CM

## 2021-06-24 DIAGNOSIS — I10: ICD-10-CM

## 2021-06-24 DIAGNOSIS — D12.0: ICD-10-CM

## 2021-06-24 DIAGNOSIS — N28.9: ICD-10-CM

## 2021-06-24 DIAGNOSIS — Z87.891: ICD-10-CM

## 2021-06-24 DIAGNOSIS — D12.3: ICD-10-CM

## 2021-06-24 DIAGNOSIS — Z88.6: ICD-10-CM

## 2021-06-24 DIAGNOSIS — J45.909: ICD-10-CM

## 2021-06-24 LAB
GLUCOSE BLD-MCNC: 128 MG/DL (ref 75–99)
INR PPP: 1 (ref ?–1.2)
PT BLD: 10.6 SEC (ref 9–12)

## 2021-06-24 PROCEDURE — 45385 COLONOSCOPY W/LESION REMOVAL: CPT

## 2021-06-24 PROCEDURE — 84132 ASSAY OF SERUM POTASSIUM: CPT

## 2021-06-24 PROCEDURE — 43249 ESOPH EGD DILATION <30 MM: CPT

## 2021-06-24 PROCEDURE — 85610 PROTHROMBIN TIME: CPT

## 2021-06-24 PROCEDURE — 43243 EGD INJECTION VARICES: CPT

## 2021-06-24 PROCEDURE — 45381 COLONOSCOPY SUBMUCOUS NJX: CPT

## 2021-06-24 PROCEDURE — 88305 TISSUE EXAM BY PATHOLOGIST: CPT

## 2021-06-24 RX ADMIN — POTASSIUM CHLORIDE SCH MLS: 14.9 INJECTION, SOLUTION INTRAVENOUS at 07:10

## 2021-06-24 RX ADMIN — POTASSIUM CHLORIDE SCH MLS: 14.9 INJECTION, SOLUTION INTRAVENOUS at 07:37

## 2021-06-24 NOTE — P.GSHP
History of Present Illness


H&P Date: 06/24/21











CHIEF COMPLAINT: GERD and colon screen





HISTORY OF PRESENT ILLNESS: The patient is a 75-year-old female who


presents with gastroesophageal reflux disease and need for colon screen.  


Upper and lower endoscopy were offered for further evaluation and management.





PAST MEDICAL HISTORY: 


Please see list.





PAST SURGICAL HISTORY: 


Please see list.





MEDICATIONS: 


Please see list.





ALLERGIES:  Please see list. 





SOCIAL HISTORY: No illicit drug use





FAMILY HISTORY: No reports of Crohn disease or ulcerative colitis. 





REVIEW OF ORGAN SYSTEMS: 


CONSTITUTIONAL: No reports of fevers or chills. 





PHYSICAL EXAM: 


VITAL SIGNS:  Stable


GENERAL: Well-developed pleasant in no acute distress. 


HEENT: No scleral icterus. Extraocular movements grossly


intact. Moist buccal mucosa. 


NECK: Supple without lymphadenopathy. 


CHEST: Unlabored respirations. Equal bilateral excursions. 


CARDIOVASCULAR: Regular rate and rhythm. Distal 2+ pulses. 


ABDOMEN: Soft, nondistended.  


MUSCULOSKELETAL: No clubbing, cyanosis, or edema. 





ASSESSMENT: 


1.  Gastroesophageal reflux disease


2.  Colon screen.





PLAN: 


1. Recommend proceeding with an upper and lower endoscopy





Past Medical History


Past Medical History: Asthma, Cancer, Diabetes Mellitus, Hyperlipidemia, 

Hypertension, Osteoarthritis (OA), Renal Disease, Thyroid Disorder


Additional Past Medical History / Comment(s): Hx Uterine cancer 25 yrs ago, 

Dialysis Mon, Wed and Fri, varicose veins.


History of Any Multi-Drug Resistant Organisms: None Reported


Past Surgical History: Bariatric Surgery, Cholecystectomy, Hysterectomy, Joint 

Replacement


Additional Past Surgical History / Comment(s): Bilateral knee REPLACED.  right 

foot surgery.


Past Anesthesia/Blood Transfusion Reactions: No Reported Reaction, Family 

History of Problems w/ Anesthesia


Additional Past Anesthesia/Blood Transfusion Reaction / Comment(s): MOTHER PONV


Past Psychological History: No Psychological Hx Reported


Smoking Status: Former smoker


Past Alcohol Use History: None Reported


Additional Past Alcohol Use History / Comment(s): Quit smoking 30+ yrs ago.


Past Drug Use History: None Reported





- Past Family History


  ** Mother


Family Medical History: Cancer, Diabetes Mellitus, Hypertension





  ** Father


Family Medical History: Diabetes Mellitus, Hypertension





  ** Sister(s)


Family Medical History: Cancer, Diabetes Mellitus





Medications and Allergies


                                Home Medications











 Medication  Instructions  Recorded  Confirmed  Type


 


Furosemide [Lasix] 40 mg PO QAM 02/04/20 06/21/21 History


 


Levothyroxine Sodium [Synthroid] 112 mcg PO QAM 02/04/20 06/21/21 History


 


Rosuvastatin Calcium [Crestor] 10 mg PO DAILY 02/04/20 06/21/21 History


 


oxyCODONE-APAP 10-325MG [Percocet 1 tab PO TID 02/04/20 06/21/21 History





 mg]    


 


Repaglinide [Prandin] 0.5 mg PO AC-TID #90 tablet 02/12/20 06/21/21 Rx


 


Albuterol Inhaler [Ventolin Hfa 2 puff INHALATION RT-QID PRN 11/09/20 06/21/21 

History





Inhaler]    


 


Calcium Acetate 667 mg PO TID 11/09/20 06/21/21 History


 


Metoprolol Tartrate [Lopressor] 25 mg PO QAM 11/09/20 06/21/21 History


 


Vitamin B Complex 1 each PO DAILY 11/09/20 06/21/21 History


 


Iron Injections 1 dose IM MOWEFR 06/21/21  History


 


Warfarin [Coumadin] 5.5 mg PO DAILY 06/21/21 06/21/21 History


 


amLODIPine [Norvasc] 10 mg PO HS 06/21/21 06/21/21 History


 


tiZANidine [Zanaflex] 4 mg PO HS 06/21/21 06/21/21 History








                                    Allergies











Allergy/AdvReac Type Severity Reaction Status Date / Time


 


naproxen [From Naprosyn] Allergy  Dyspnea Verified 06/24/21 07:08














Surgical - Exam


                                   Vital Signs











Temp Pulse Resp BP Pulse Ox


 


 98.3 F   62   16   156/65   96 


 


 06/24/21 07:05  06/24/21 07:05  06/24/21 07:05  06/24/21 07:05  06/24/21 07:05














Results





- Labs





                                 06/24/21 07:06


                  Abnormal Lab Results - Last 24 Hours (Table)











  06/24/21 Range/Units





  07:10 


 


POC Glucose (mg/dL)  128 H  (75-99)  mg/dL








                                 Diabetes panel











  06/24/21 Range/Units





  07:06 


 


Potassium  3.6  (3.5-5.1)  mmol/L








                                 Pituitary panel











  06/24/21 Range/Units





  07:06 


 


Potassium  3.6  (3.5-5.1)  mmol/L








                                  Adrenal panel











  06/24/21 Range/Units





  07:06 


 


Potassium  3.6  (3.5-5.1)  mmol/L

## 2021-06-24 NOTE — P.PCN
Date of Procedure: 06/24/21


Description of Procedure: 





PREOPERATIVE DIAGNOSIS:


Colonoscopy screening


End-stage renal disease


Morbid obesity due to excess calories


Dialysis-dependent


Chronic anticoagulation





POSTOPERATIVE DIAGNOSIS:


Diverticulosis, sigmoid colon


Tubular adenomas ascending colon


Tubular adenoma cecum


Tubular adenomas traverse


Tubular adenomas descending colon





OPERATION:


Colonoscopy to the ileocecal valve and appendiceal orifice, cecum


Colonoscopy with hot snare polypectomy


Colonoscopy with injection of Betsy ink splenic flexure


Colonoscopy with ablation transverse colon





SURGEON: Ara Alfaro MD.





ANESTHESIA: MAC.





INDICATIONS:


The patient is an 75-year-old female who is for colonoscopy screening.  Last col

onoscopy over 20 years ago. Benefits and risks were described and informed 

consent was obtained.





DESCRIPTION OF PROCEDURE:


The patient had undergone Sutab prep.  The patient had been brought into the 

operating room and laid in the left lateral decubitus position.  After adequate 

intravenous sedation, the rectum was examined with 2% lidocaine jelly. The 

prostate was unremarkable.  No external hemorrhoids were encountered. The rectal

tone was within normal limits. No lesions were palpated in the rectal vault. An 

Olympus colonoscope was advanced until the cecum, ileocecal valve and 

appendiceal orifice were clearly viewed.  The prep was fair.  Impacted stool was

found within scattered diverticuli.  Sigmoid diverticulosis was encountered.  

Colonic polyps were found and removed.   At the distal transverse colon/splenic 

flexure, large adenoma over 1 cm was difficult to resect despite multiple 

attempts.  Injection of Betsy ink 2 mL was performed proximal distal to the 

lesion. No evidence of focal colitis was found. Retroflexion of the scope 

demonstrated grade 2 internal hemorrhoids without active bleeding or 

inflammation. The colon was desufflated. The patient had tolerated the procedure

well. 





Withdrawal time was over 6 minutes.





FINDINGS:


Aronchick preparation quality scale 2 (1-5)


Internal hemorrhoids, grade 1 with recent inflammation and bleeding


No external hemorrhoids


No arteriovenous malformations.


Sigmoid diverticulosis


Removal, injection and ablation of 15 polyps:


- Snare polypectomy cecal polyp, 5 mm tubulovillous adenoma polyp.


- Snare polypectomy proximal ascending, 8 mm villous adenoma polyp.


- Snare polypectomy mid ascending colon polyp 2, 5 to 8 villous adenoma polyp.


- Snare polypectomy descending colon polyp, 8 mm polyp.


- Snare polypectomy proximal transverse colon 2, in 8-10 mm polyp.


- Snare polypectomy mid transverse colontomy, 6 mm polyp.


- Snare polypectomy distal transverse colon, removed, 8 mm unable to retrieve 


- Snare polypectomy descending colon polyp, 10 mm polyp.


- Snare polypectomy at 40 cm from the anal verge 2, 6-8 mm polyp.


- Ablation 3 mm polyp, ascending colon 


- Ablation 4 mm polyp, transverse colon.


- Injection of Betsy 2 mL total, proximal and distal to lesion at splenic 

flexure/distal transverse colon over 2 cm, unable to resect or retrieve


No focal colitis.





RECOMMENDATIONS:


1.  Repeat colonoscopy in 3 months, September 2021 





Plan - Discharge Summary


Discharge Rx Participant: No


New Discharge Prescriptions: 


Continue


   Furosemide [Lasix] 40 mg PO QAM


   Levothyroxine Sodium [Synthroid] 112 mcg PO QAM


   oxyCODONE-APAP 10-325MG [Percocet  mg] 1 tab PO TID


   Rosuvastatin Calcium [Crestor] 10 mg PO DAILY


   Repaglinide [Prandin] 0.5 mg PO AC-TID #90 tablet


   Metoprolol Tartrate [Lopressor] 25 mg PO QAM


   Albuterol Inhaler [Ventolin Hfa Inhaler] 2 puff INHALATION RT-QID PRN


     PRN Reason: Asthma


   Vitamin B Complex 1 each PO DAILY


   Calcium Acetate 667 mg PO TID


   Warfarin [Coumadin] 5.5 mg PO DAILY


   amLODIPine [Norvasc] 10 mg PO HS


   tiZANidine [Zanaflex] 4 mg PO HS


   Iron Injections 1 dose IM MOWEFR


Discharge Medication List





Furosemide [Lasix] 40 mg PO QAM 02/04/20 [History]


Levothyroxine Sodium [Synthroid] 112 mcg PO QAM 02/04/20 [History]


Rosuvastatin Calcium [Crestor] 10 mg PO DAILY 02/04/20 [History]


oxyCODONE-APAP 10-325MG [Percocet  mg] 1 tab PO TID 02/04/20 [History]


Repaglinide [Prandin] 0.5 mg PO AC-TID #90 tablet 02/12/20 [Rx]


Albuterol Inhaler [Ventolin Hfa Inhaler] 2 puff INHALATION RT-QID PRN 11/09/20 

[History]


Calcium Acetate 667 mg PO TID 11/09/20 [History]


Metoprolol Tartrate [Lopressor] 25 mg PO QAM 11/09/20 [History]


Vitamin B Complex 1 each PO DAILY 11/09/20 [History]


Iron Injections 1 dose IM MOWEFR 06/21/21 [History]


Warfarin [Coumadin] 5.5 mg PO DAILY 06/21/21 [History]


amLODIPine [Norvasc] 10 mg PO HS 06/21/21 [History]


tiZANidine [Zanaflex] 4 mg PO HS 06/21/21 [History]








Follow up Appointment(s)/Referral(s): 


Ara Alfaro MD [STAFF PHYSICIAN] - 07/13/21


Patient Instructions/Handouts:  *[facility] - (Anesthesia) Endoscopy Discharge 

Instructions, Diverticulitis Diet (GEN), Diverticulosis (DC), Colorectal Polyps 

(DC)


Activity/Diet/Wound Care/Special Instructions: 


Start coumadin Saturday, June 26th. Follow-up in office. Repeat colonoscopy in 3

months, September 2021 


Discharge Disposition: HOME SELF-CARE

## 2021-06-24 NOTE — P.PCN
Date of Procedure: 06/24/21


Description of Procedure: 











PREOPERATIVE DIAGNOSIS:


Dysphagia.


Gastroesophageal reflux disease


Esophageal dysmotility





POSTOPERATIVE DIAGNOSIS:


Dysphagia.


Gastroesophageal reflux disease


Esophageal dysmotility





OPERATION:


Esophagogastroduodenoscopy with rigid dilator over the guidewire 51 Fr 

esophageal dilation





SURGEON: Ara Alfaro MD





ANESTHESIA: MAC.





INDICATIONS:


The patient is a 75-year-old female who presents with a history of dysphagia. 

Benefits and risks of the procedure were described. Informed consent was 

obtained.





DESCRIPTION:


The patient was brought into the endoscopy suite and laid in the left lateral 

decubitus position.  After a timeout was confirmed, the procedure was initiated.




An Olympus gastroscope was passed into the posterior oropharynx down to the 

distal esophagus were smallest 1 cm hiatal hernia recurrence was identified.  

The scope was entered into the gastric pouch.  No ulcerations were identified.  

Moderate tertiary contractions with diffuse esophageal stenosis was identified. 

At 40 cm from the incisors, a hiatus was identified.  The gastric pouch is less 

than 2 cm in size. Blind jejunal limb is 5 cm.  The scope was passed 50 cm from 

the incisors.  Rigid dilator over guidewire was selected.





Next using an American rigid dilator, a guidewire was placed through the 

gastroscope.  Next the scope was withdrawn.  A 51-French rigid American dilator 

was passed carefully along the posterior oropharynx to 50 cm and left in place 

for 2-3 minutes stretch. The dilator was withdrawn including the guidewire.  The

scope was reentered along the posterior oropharynx with no findings of full-

thickness tear of the upper esophageal sphincter.  





No full-thickness injury was encountered.  The GI tract was desufflated.  The 

patient tolerated the procedure well.  





FINDINGS:


LA grade A erosive esophagitis


No active ulceration along the anastomosis


Presbyesophagus with moderate tertiary contractions


Gastric pouch 2 cm


Blind jejunal limb 5 cm


American rigid dilator 51-French completed.





RECOMMENDATIONS:


Upper endoscopy as needed

## 2021-08-27 ENCOUNTER — HOSPITAL ENCOUNTER (EMERGENCY)
Dept: HOSPITAL 47 - EC | Age: 76
Discharge: OTHER INSTITUTION | End: 2021-08-27
Payer: MEDICARE

## 2021-08-27 VITALS — HEART RATE: 95 BPM

## 2021-08-27 VITALS — SYSTOLIC BLOOD PRESSURE: 160 MMHG | DIASTOLIC BLOOD PRESSURE: 71 MMHG | RESPIRATION RATE: 18 BRPM

## 2021-08-27 VITALS — TEMPERATURE: 99.4 F

## 2021-08-27 DIAGNOSIS — Z79.890: ICD-10-CM

## 2021-08-27 DIAGNOSIS — I50.9: ICD-10-CM

## 2021-08-27 DIAGNOSIS — Z79.51: ICD-10-CM

## 2021-08-27 DIAGNOSIS — Z79.899: ICD-10-CM

## 2021-08-27 DIAGNOSIS — R74.01: ICD-10-CM

## 2021-08-27 DIAGNOSIS — E11.22: ICD-10-CM

## 2021-08-27 DIAGNOSIS — Z87.891: ICD-10-CM

## 2021-08-27 DIAGNOSIS — E87.6: ICD-10-CM

## 2021-08-27 DIAGNOSIS — J45.909: ICD-10-CM

## 2021-08-27 DIAGNOSIS — I13.0: Primary | ICD-10-CM

## 2021-08-27 DIAGNOSIS — Z82.49: ICD-10-CM

## 2021-08-27 DIAGNOSIS — M19.90: ICD-10-CM

## 2021-08-27 DIAGNOSIS — Z83.3: ICD-10-CM

## 2021-08-27 DIAGNOSIS — E78.5: ICD-10-CM

## 2021-08-27 DIAGNOSIS — Z79.01: ICD-10-CM

## 2021-08-27 DIAGNOSIS — N18.9: ICD-10-CM

## 2021-08-27 LAB
ALBUMIN SERPL-MCNC: 4.3 G/DL (ref 3.5–5)
ALP SERPL-CCNC: 168 U/L (ref 38–126)
ALT SERPL-CCNC: 36 U/L (ref 4–34)
ANION GAP SERPL CALC-SCNC: 10 MMOL/L
APTT BLD: 27.5 SEC (ref 22–30)
AST SERPL-CCNC: 71 U/L (ref 14–36)
BASOPHILS # BLD AUTO: 0 K/UL (ref 0–0.2)
BASOPHILS NFR BLD AUTO: 0 %
BUN SERPL-SCNC: 24 MG/DL (ref 7–17)
CALCIUM SPEC-MCNC: 9.3 MG/DL (ref 8.4–10.2)
CHLORIDE SERPL-SCNC: 100 MMOL/L (ref 98–107)
CK SERPL-CCNC: 579 U/L (ref 30–135)
CO2 SERPL-SCNC: 30 MMOL/L (ref 22–30)
EOSINOPHIL # BLD AUTO: 0 K/UL (ref 0–0.7)
EOSINOPHIL NFR BLD AUTO: 0 %
ERYTHROCYTE [DISTWIDTH] IN BLOOD BY AUTOMATED COUNT: 4.34 M/UL (ref 3.8–5.4)
ERYTHROCYTE [DISTWIDTH] IN BLOOD: 14.1 % (ref 11.5–15.5)
GLUCOSE SERPL-MCNC: 139 MG/DL (ref 74–99)
HCT VFR BLD AUTO: 42.3 % (ref 34–46)
HGB BLD-MCNC: 14 GM/DL (ref 11.4–16)
INR PPP: 1.5 (ref ?–1.2)
LYMPHOCYTES # SPEC AUTO: 1.7 K/UL (ref 1–4.8)
LYMPHOCYTES NFR SPEC AUTO: 16 %
MCH RBC QN AUTO: 32.2 PG (ref 25–35)
MCHC RBC AUTO-ENTMCNC: 33.1 G/DL (ref 31–37)
MCV RBC AUTO: 97.4 FL (ref 80–100)
MONOCYTES # BLD AUTO: 0.5 K/UL (ref 0–1)
MONOCYTES NFR BLD AUTO: 4 %
NEUTROPHILS # BLD AUTO: 8 K/UL (ref 1.3–7.7)
NEUTROPHILS NFR BLD AUTO: 78 %
PLATELET # BLD AUTO: 256 K/UL (ref 150–450)
POTASSIUM SERPL-SCNC: 3.3 MMOL/L (ref 3.5–5.1)
PROT SERPL-MCNC: 7.3 G/DL (ref 6.3–8.2)
PT BLD: 14.7 SEC (ref 9–12)
SODIUM SERPL-SCNC: 140 MMOL/L (ref 137–145)
WBC # BLD AUTO: 10.3 K/UL (ref 3.8–10.6)

## 2021-08-27 PROCEDURE — 93005 ELECTROCARDIOGRAM TRACING: CPT

## 2021-08-27 PROCEDURE — 94640 AIRWAY INHALATION TREATMENT: CPT

## 2021-08-27 PROCEDURE — 70450 CT HEAD/BRAIN W/O DYE: CPT

## 2021-08-27 PROCEDURE — 96374 THER/PROPH/DIAG INJ IV PUSH: CPT

## 2021-08-27 PROCEDURE — 71046 X-RAY EXAM CHEST 2 VIEWS: CPT

## 2021-08-27 PROCEDURE — 85610 PROTHROMBIN TIME: CPT

## 2021-08-27 PROCEDURE — 36415 COLL VENOUS BLD VENIPUNCTURE: CPT

## 2021-08-27 PROCEDURE — 82550 ASSAY OF CK (CPK): CPT

## 2021-08-27 PROCEDURE — 83880 ASSAY OF NATRIURETIC PEPTIDE: CPT

## 2021-08-27 PROCEDURE — 83605 ASSAY OF LACTIC ACID: CPT

## 2021-08-27 PROCEDURE — 99285 EMERGENCY DEPT VISIT HI MDM: CPT

## 2021-08-27 PROCEDURE — 87040 BLOOD CULTURE FOR BACTERIA: CPT

## 2021-08-27 PROCEDURE — 84443 ASSAY THYROID STIM HORMONE: CPT

## 2021-08-27 PROCEDURE — 85730 THROMBOPLASTIN TIME PARTIAL: CPT

## 2021-08-27 PROCEDURE — 93306 TTE W/DOPPLER COMPLETE: CPT

## 2021-08-27 PROCEDURE — 84484 ASSAY OF TROPONIN QUANT: CPT

## 2021-08-27 PROCEDURE — 85025 COMPLETE CBC W/AUTO DIFF WBC: CPT

## 2021-08-27 PROCEDURE — 87635 SARS-COV-2 COVID-19 AMP PRB: CPT

## 2021-08-27 PROCEDURE — 80053 COMPREHEN METABOLIC PANEL: CPT

## 2021-08-27 PROCEDURE — 96375 TX/PRO/DX INJ NEW DRUG ADDON: CPT

## 2021-08-27 NOTE — ED
Medical Decision Making





- Medical Decision Making





Patient initially was to be admitted to this facility but computed tomography 

scan showed evidence of a very large right-sided hematoma with 2.3 cm thickness 

evidence of significant mass effect 1.7 cm left forward midline shift with 

effacement of the right ventricle and third ventricle evidence of early 

developing obstructive hydrocephalus left lateral ventricle left forward self 

also herniation please see the complete report the patient will be transferred 

to [facility] I did discuss the case with Dr. Goldberger as well as Dr. Davison





- Lab Data


Result diagrams: 


                                 08/27/21 12:17





                                 08/27/21 12:17





                                   Lab Results











  08/27/21 08/27/21 08/27/21 Range/Units





  12:17 12:17 12:17 


 


WBC  10.3    (3.8-10.6)  k/uL


 


RBC  4.34    (3.80-5.40)  m/uL


 


Hgb  14.0    (11.4-16.0)  gm/dL


 


Hct  42.3    (34.0-46.0)  %


 


MCV  97.4    (80.0-100.0)  fL


 


MCH  32.2    (25.0-35.0)  pg


 


MCHC  33.1    (31.0-37.0)  g/dL


 


RDW  14.1    (11.5-15.5)  %


 


Plt Count  256    (150-450)  k/uL


 


MPV  8.0    


 


Neutrophils %  78    %


 


Lymphocytes %  16    %


 


Monocytes %  4    %


 


Eosinophils %  0    %


 


Basophils %  0    %


 


Neutrophils #  8.0 H    (1.3-7.7)  k/uL


 


Lymphocytes #  1.7    (1.0-4.8)  k/uL


 


Monocytes #  0.5    (0-1.0)  k/uL


 


Eosinophils #  0.0    (0-0.7)  k/uL


 


Basophils #  0.0    (0-0.2)  k/uL


 


PT   14.7 H   (9.0-12.0)  sec


 


INR   1.5 H   (<1.2)  


 


APTT   27.5   (22.0-30.0)  sec


 


Sodium    140  (137-145)  mmol/L


 


Potassium    3.3 L  (3.5-5.1)  mmol/L


 


Chloride    100  ()  mmol/L


 


Carbon Dioxide    30  (22-30)  mmol/L


 


Anion Gap    10  mmol/L


 


BUN    24 H  (7-17)  mg/dL


 


Creatinine    2.92 H  (0.52-1.04)  mg/dL


 


Est GFR (CKD-EPI)AfAm    17  (>60 ml/min/1.73 sqM)  


 


Est GFR (CKD-EPI)NonAf    15  (>60 ml/min/1.73 sqM)  


 


Glucose    139 H  (74-99)  mg/dL


 


Plasma Lactic Acid Tomy     (0.7-2.0)  mmol/L


 


Calcium    9.3  (8.4-10.2)  mg/dL


 


Total Bilirubin    1.0  (0.2-1.3)  mg/dL


 


AST    71 H  (14-36)  U/L


 


ALT    36 H  (4-34)  U/L


 


Alkaline Phosphatase    168 H  ()  U/L


 


Creatine Kinase    579 H  ()  U/L


 


Troponin I     (0.000-0.034)  ng/mL


 


NT-Pro-B Natriuret Pep     pg/mL


 


Total Protein    7.3  (6.3-8.2)  g/dL


 


Albumin    4.3  (3.5-5.0)  g/dL


 


TSH     (0.465-4.680)  mIU/L


 


Coronavirus (PCR)     (Not Detectd)  














  08/27/21 08/27/21 08/27/21 Range/Units





  12:17 12:17 12:17 


 


WBC     (3.8-10.6)  k/uL


 


RBC     (3.80-5.40)  m/uL


 


Hgb     (11.4-16.0)  gm/dL


 


Hct     (34.0-46.0)  %


 


MCV     (80.0-100.0)  fL


 


MCH     (25.0-35.0)  pg


 


MCHC     (31.0-37.0)  g/dL


 


RDW     (11.5-15.5)  %


 


Plt Count     (150-450)  k/uL


 


MPV     


 


Neutrophils %     %


 


Lymphocytes %     %


 


Monocytes %     %


 


Eosinophils %     %


 


Basophils %     %


 


Neutrophils #     (1.3-7.7)  k/uL


 


Lymphocytes #     (1.0-4.8)  k/uL


 


Monocytes #     (0-1.0)  k/uL


 


Eosinophils #     (0-0.7)  k/uL


 


Basophils #     (0-0.2)  k/uL


 


PT     (9.0-12.0)  sec


 


INR     (<1.2)  


 


APTT     (22.0-30.0)  sec


 


Sodium     (137-145)  mmol/L


 


Potassium     (3.5-5.1)  mmol/L


 


Chloride     ()  mmol/L


 


Carbon Dioxide     (22-30)  mmol/L


 


Anion Gap     mmol/L


 


BUN     (7-17)  mg/dL


 


Creatinine     (0.52-1.04)  mg/dL


 


Est GFR (CKD-EPI)AfAm     (>60 ml/min/1.73 sqM)  


 


Est GFR (CKD-EPI)NonAf     (>60 ml/min/1.73 sqM)  


 


Glucose     (74-99)  mg/dL


 


Plasma Lactic Acid Tomy  1.2    (0.7-2.0)  mmol/L


 


Calcium     (8.4-10.2)  mg/dL


 


Total Bilirubin     (0.2-1.3)  mg/dL


 


AST     (14-36)  U/L


 


ALT     (4-34)  U/L


 


Alkaline Phosphatase     ()  U/L


 


Creatine Kinase     ()  U/L


 


Troponin I   0.020   (0.000-0.034)  ng/mL


 


NT-Pro-B Natriuret Pep    6050  pg/mL


 


Total Protein     (6.3-8.2)  g/dL


 


Albumin     (3.5-5.0)  g/dL


 


TSH     (0.465-4.680)  mIU/L


 


Coronavirus (PCR)     (Not Detectd)  














  08/27/21 08/27/21 08/27/21 Range/Units





  12:17 12:17 16:21 


 


WBC     (3.8-10.6)  k/uL


 


RBC     (3.80-5.40)  m/uL


 


Hgb     (11.4-16.0)  gm/dL


 


Hct     (34.0-46.0)  %


 


MCV     (80.0-100.0)  fL


 


MCH     (25.0-35.0)  pg


 


MCHC     (31.0-37.0)  g/dL


 


RDW     (11.5-15.5)  %


 


Plt Count     (150-450)  k/uL


 


MPV     


 


Neutrophils %     %


 


Lymphocytes %     %


 


Monocytes %     %


 


Eosinophils %     %


 


Basophils %     %


 


Neutrophils #     (1.3-7.7)  k/uL


 


Lymphocytes #     (1.0-4.8)  k/uL


 


Monocytes #     (0-1.0)  k/uL


 


Eosinophils #     (0-0.7)  k/uL


 


Basophils #     (0-0.2)  k/uL


 


PT     (9.0-12.0)  sec


 


INR     (<1.2)  


 


APTT     (22.0-30.0)  sec


 


Sodium     (137-145)  mmol/L


 


Potassium     (3.5-5.1)  mmol/L


 


Chloride     ()  mmol/L


 


Carbon Dioxide     (22-30)  mmol/L


 


Anion Gap     mmol/L


 


BUN     (7-17)  mg/dL


 


Creatinine     (0.52-1.04)  mg/dL


 


Est GFR (CKD-EPI)AfAm     (>60 ml/min/1.73 sqM)  


 


Est GFR (CKD-EPI)NonAf     (>60 ml/min/1.73 sqM)  


 


Glucose     (74-99)  mg/dL


 


Plasma Lactic Acid Tomy     (0.7-2.0)  mmol/L


 


Calcium     (8.4-10.2)  mg/dL


 


Total Bilirubin     (0.2-1.3)  mg/dL


 


AST     (14-36)  U/L


 


ALT     (4-34)  U/L


 


Alkaline Phosphatase     ()  U/L


 


Creatine Kinase     ()  U/L


 


Troponin I    0.028  (0.000-0.034)  ng/mL


 


NT-Pro-B Natriuret Pep     pg/mL


 


Total Protein     (6.3-8.2)  g/dL


 


Albumin     (3.5-5.0)  g/dL


 


TSH   1.900   (0.465-4.680)  mIU/L


 


Coronavirus (PCR)  Not Detected    (Not Detectd)  














- Radiology Data


Radiology results: report reviewed (I did receive a call from the radiologist CT

of the brain showed a very large subdural hematoma which appear to be chronic 

2.3 cm thick with 1.7 cm left forward midline shift and effacement of the right 

lateral ventricle and third ventricle also suspicious for developing obstructive

hydrocephalus in), image reviewed





Disposition


Clinical Impression: 


 Weakness, Dyspnea, Congestive heart failure, Fall, Chronic renal failure, 

Hypokalemia, Elevated CPK, Transaminitis, Subdural hematoma





Disposition: OTHER INSTITUTION NOT DEFINED


Condition: Fair


Referrals: 


Esau Ashraf DO [Primary Care Provider] - 1-2 days





- Out of Hospital Transfer - Req. Specs


Out of Hospital Transfer - Requested Specifics: Other Emergency Center

## 2021-08-27 NOTE — CT
EXAMINATION TYPE: CT brain wo con

 

DATE OF EXAM: 8/27/2021

 

COMPARISON: 2/4/2020

 

HISTORY: 76-year-old female moderate to severe head trauma. Confusion, altered mental status

 

TECHNIQUE:  Examination was done in axial plane without intravenous contrast.  Coronal and sagittal r
econstructions performed.

 

CT DLP: 1137.4 mGycm

Automated exposure control for dose reduction was used.

 

FINDINGS:

There is a very large chronic subdural hematoma along the right lateral convexity measuring up to 1.9
 cm thick superiorly and 2.3 cm thick more anteriorly.

 

Admixed subacute blood products are present with some possible admixed acute hemorrhage anteriorly an
d inferiorly, axial image 7.

 

There is associated mass effect onto the right cerebrum effacing the right lateral ventricle and cont
ributing to 1.7 cm of leftward midline shift. There is leftward subfalcine herniation but no downward
 transtentorial herniation seen at this time.

 

Interval enlargement of the left lateral ventricle probably due to some degree of CSF obstruction at 
the level of the third ventricle.

 

Overall gray-white matter differentiation appears maintained at this time.

 

Partially empty sella.

 

There seems to be some right lateral periorbital soft tissue swelling. Paranasal sinuses and mastoid 
air cells well pneumatized.

 

 

 

IMPRESSION:

 

1. Very large subdural hematoma along the right lateral convexity measuring up to 2.3 cm thick. This 
is predominantly a chronic subdural but with strands of admixed subacute blood. Anteriorly and inferi
sukh, we suspect a small amount of admixed acute hemorrhage.

2. This causes significant mass effect with 1.7 cm leftward midline shift and effacement of the right
 lateral ventricle and third ventricle. Suspect early developing obstructive hydrocephalus of the lef
t lateral ventricle.

3. Leftward subfalcine herniation but no loss of gray-white matter differentiation to suggest acute i
nfarct at this time.

4. No downward transtentorial herniation seen.

5. Right lateral periorbital soft tissue swelling.

 

Critical findings called to Dr. Delatorre in the ER at 4:16 PM. No

## 2021-08-27 NOTE — ED
Weakness HPI





- General


Chief complaint: Weakness


Stated complaint: lumbar pain, weakness


Time Seen by Provider: 08/27/21 11:17


Source: EMS


Mode of arrival: EMS


Limitations: no limitations





- History of Present Illness


Initial comments: 





This 76-year-old female presents with the complaint of some weakness and 

shortness of breath.  She apparently fell to the ground yesterday morning.  She 

could not get back up.  She apparently was communicating with a family or friend

and the are present initially and state that she may have been confused early 

this morning.  The relate that they called the  who had 

maintenance do a well check on her and they found her on the ground and then 

called EMS.  The patient states that she has been very weak.  Her shortness of 

breath has been somewhat worse recently as well.  She has had a slight cough as 

well.  She denies any chest pain or fevers or chills.  There has been no leg 

pain or swelling.  He does relate that she apparently fell to the ground.  She 

is complaining of some back pain but states that this is chronic in nature, is 

present in her lower back, and is unchanged as compared to previous.  She denies

any head injury.  No other complaints or modifying factors.





- Related Data


                                Home Medications











 Medication  Instructions  Recorded  Confirmed


 


Furosemide [Lasix] 40 mg PO DAILY 02/04/20 08/27/21


 


Levothyroxine Sodium [Synthroid] 112 mcg PO DAILY 02/04/20 08/27/21


 


Rosuvastatin Calcium [Crestor] 10 mg PO DAILY 02/04/20 08/27/21


 


oxyCODONE-APAP 10-325MG [Percocet 1 tab PO TID 02/04/20 08/27/21





 mg]   


 


Albuterol Inhaler [Ventolin Hfa 2 puff INHALATION RT-QID PRN 11/09/20 08/27/21





Inhaler]   


 


Calcium Acetate 1,334 mg PO TID 11/09/20 08/27/21


 


Metoprolol Tartrate [Lopressor] 25 mg PO DAILY 11/09/20 08/27/21


 


Iron Injections 1 dose IM MOWEFR 06/21/21 08/27/21


 


amLODIPine [Norvasc] 10 mg PO HS 06/21/21 08/27/21


 


tiZANidine [Zanaflex] 4 mg PO HS 06/21/21 08/27/21


 


Ergocalciferol [Vitamin D2 (1250 1,250 mcg PO MO 08/27/21 08/27/21





Mcg = 74417 Iu)]   


 


Lidocaine-Prilocaine Cream [Emla 1 applic TOPICAL AS DIRECTED PRN 08/27/21 08/ 27/21





Cream 2.5%/2.5%]   


 


SILVER sulfADIAZINE CREAM 1 applic TOPICAL BID 08/27/21 08/27/21





[Silvadene Cream]   


 


Warfarin [Coumadin] 6 mg PO HS 08/27/21 08/27/21


 


amLODIPine [Norvasc] 10 mg PO HS 08/27/21 08/27/21


 


lisinopriL [Zestril] 5 mg PO HS 08/27/21 08/27/21


 


tiZANidine [Zanaflex] 4 mg PO HS 08/27/21 08/27/21








                                  Previous Rx's











 Medication  Instructions  Recorded


 


Repaglinide [Prandin] 0.5 mg PO AC-TID #90 tablet 02/12/20











                                    Allergies











Allergy/AdvReac Type Severity Reaction Status Date / Time


 


naproxen [From Naprosyn] Allergy  Dyspnea Verified 08/27/21 13:08














Review of Systems


ROS Statement: 


Those systems with pertinent positive or pertinent negative responses have been 

documented in the HPI.





ROS Other: All systems not noted in ROS Statement are negative.





Past Medical History


Past Medical History: Asthma, Cancer, Diabetes Mellitus, Hyperlipidemia, 

Hypertension, Osteoarthritis (OA), Renal Disease, Thyroid Disorder


Additional Past Medical History / Comment(s): Hx Uterine cancer 25 yrs ago, 

Dialysis Mon, Wed and Fri, varicose veins.


History of Any Multi-Drug Resistant Organisms: None Reported


Past Surgical History: Bariatric Surgery, Cholecystectomy, Hysterectomy, Joint 

Replacement


Additional Past Surgical History / Comment(s): Bilateral knee and right foot 

surgery.


Past Anesthesia/Blood Transfusion Reactions: No Reported Reaction


Additional Past Anesthesia/Blood Transfusion Reaction / Comment(s): MOTHER PONV


Past Psychological History: No Psychological Hx Reported


Smoking Status: Former smoker


Past Alcohol Use History: None Reported


Past Drug Use History: None Reported





- Past Family History


  ** Mother


Family Medical History: Cancer, Diabetes Mellitus, Hypertension





  ** Father


Family Medical History: Diabetes Mellitus, Hypertension





  ** Sister(s)


Family Medical History: Cancer, Diabetes Mellitus





General Exam





- General Exam Comments


Initial Comments: 





GENERAL: The patient is well nourished and well hydrated. 


VITAL SIGNS: Heart rate, blood pressure, respiratory rate reviewed as recorded 

in nurse's notes. 


EYES: Pupils are round and reactive. Extraocular movements are intact. No 

conjunctival / lid redness or swelling. 


ENT: No external evidence of injury, swelling, or ecchymosis. Airway is patent. 

Throat is clear. 


NECK: Nontender. No swelling or evidence of injury. No subcutaneous emphysema. 

Trachea is midline. No thyroid mass. 


HEART: Regular rate and rhythm. Good peripheral pulses. 


LUNGS/CHEST: Breath sounds clear and equal bilaterally. No rales, rhonchi, or 

wheezes. No ecchymosis, subcutaneous emphysema, or tenderness. 


ABDOMEN: Abdomen soft without tenderness. No palpable masses or organomegaly. No

peritoneal signs. No abdominal wall swelling or ecchymosis. 


EXTREMITIES: No extremity tenderness. Normal muscle tone and function. No 

thoracolumbar tenderness.  No leg swelling noted.


NEUROLOGIC: Sensation is grossly intact. Cranial nerve exam reveals face is 

symmetrical, tongue is midline, speech is clear. 


SKIN: No abrasions or ecchymosis is noted. No induration or masses noted. 


PSYCHIATRIC: Alert and oriented. Appropriate behavior and judgment.





Limitations: no limitations





Course





                                   Vital Signs











  08/27/21 08/27/21 08/27/21





  10:37 11:22 11:25


 


Temperature 99.4 F  


 


Pulse Rate 95  96


 


Pulse Rate [  96 





Cardiac Monitor   





]   


 


Respiratory 20  18





Rate   


 


Blood Pressure 172/93  160/71


 


O2 Sat by Pulse 100  100





Oximetry   














  08/27/21 08/27/21





  14:02 14:13


 


Temperature  


 


Pulse Rate 95 95


 


Pulse Rate [  





Cardiac Monitor  





]  


 


Respiratory  





Rate  


 


Blood Pressure  


 


O2 Sat by Pulse  





Oximetry  














Medical Decision Making





- Medical Decision Making





The patient was seen and examined.  All diagnostics were reviewed.  An IV is 

established and she is placed on a cardiac monitor.  No ectopy is identified.  

EKG shows a normal sinus rhythm at a rate of 95.  There is nonspecific ST T-wave

abnormality primarily in the lateral leads.  No ST elevation is identified.  The

ND intervals 146, QS duration is 98, and the QTC intervals 457.  The chest x-ray

apparently is somewhat improved as compared to previous but does show increased 

central vascularity.  CPK is slightly elevated at 579, ever function studies are

slightly elevated, creatinine is elevated at 2.9 to the patient does have a 

chronic renal disease diagnosis and previous was 2.7.  Potassium is somewhat low

at 3.3 INR is elevated at 1.5, and BNP is elevated at 6015.  Overall, it is felt

as though patient likely does have a degree of congestive heart failure and this

could be causing the dyspnea and weakness.  It does not appear as though she is 

in rhabdomyolysis currently.  She appears to have chronic renal failure as well.

 It is felt as though she would benefit from admission to the hospital for 

further treatment and diuresis.  The case is discussed with Dr. irwin who is 

agreeable patient will be admitted to a monitor bed with cardiology to consult. 

Patient feels improved on recheck.  Due to her confusion earlier today and fall 

it is felt as though she may benefit from a computed tomography scan of her b

rain despite her denying any known head injury.  This is ordered and is pending.





- Lab Data


Result diagrams: 


                                 08/27/21 12:17





                                 08/27/21 12:17





                                   Lab Results











  08/27/21 08/27/21 08/27/21 Range/Units





  12:17 12:17 12:17 


 


WBC  10.3    (3.8-10.6)  k/uL


 


RBC  4.34    (3.80-5.40)  m/uL


 


Hgb  14.0    (11.4-16.0)  gm/dL


 


Hct  42.3    (34.0-46.0)  %


 


MCV  97.4    (80.0-100.0)  fL


 


MCH  32.2    (25.0-35.0)  pg


 


MCHC  33.1    (31.0-37.0)  g/dL


 


RDW  14.1    (11.5-15.5)  %


 


Plt Count  256    (150-450)  k/uL


 


MPV  8.0    


 


Neutrophils %  78    %


 


Lymphocytes %  16    %


 


Monocytes %  4    %


 


Eosinophils %  0    %


 


Basophils %  0    %


 


Neutrophils #  8.0 H    (1.3-7.7)  k/uL


 


Lymphocytes #  1.7    (1.0-4.8)  k/uL


 


Monocytes #  0.5    (0-1.0)  k/uL


 


Eosinophils #  0.0    (0-0.7)  k/uL


 


Basophils #  0.0    (0-0.2)  k/uL


 


PT   14.7 H   (9.0-12.0)  sec


 


INR   1.5 H   (<1.2)  


 


APTT   27.5   (22.0-30.0)  sec


 


Sodium    140  (137-145)  mmol/L


 


Potassium    3.3 L  (3.5-5.1)  mmol/L


 


Chloride    100  ()  mmol/L


 


Carbon Dioxide    30  (22-30)  mmol/L


 


Anion Gap    10  mmol/L


 


BUN    24 H  (7-17)  mg/dL


 


Creatinine    2.92 H  (0.52-1.04)  mg/dL


 


Est GFR (CKD-EPI)AfAm    17  (>60 ml/min/1.73 sqM)  


 


Est GFR (CKD-EPI)NonAf    15  (>60 ml/min/1.73 sqM)  


 


Glucose    139 H  (74-99)  mg/dL


 


Plasma Lactic Acid Tomy     (0.7-2.0)  mmol/L


 


Calcium    9.3  (8.4-10.2)  mg/dL


 


Total Bilirubin    1.0  (0.2-1.3)  mg/dL


 


AST    71 H  (14-36)  U/L


 


ALT    36 H  (4-34)  U/L


 


Alkaline Phosphatase    168 H  ()  U/L


 


Creatine Kinase    579 H  ()  U/L


 


Troponin I     (0.000-0.034)  ng/mL


 


NT-Pro-B Natriuret Pep     pg/mL


 


Total Protein    7.3  (6.3-8.2)  g/dL


 


Albumin    4.3  (3.5-5.0)  g/dL


 


Coronavirus (PCR)     (Not Detectd)  














  08/27/21 08/27/21 08/27/21 Range/Units





  12:17 12:17 12:17 


 


WBC     (3.8-10.6)  k/uL


 


RBC     (3.80-5.40)  m/uL


 


Hgb     (11.4-16.0)  gm/dL


 


Hct     (34.0-46.0)  %


 


MCV     (80.0-100.0)  fL


 


MCH     (25.0-35.0)  pg


 


MCHC     (31.0-37.0)  g/dL


 


RDW     (11.5-15.5)  %


 


Plt Count     (150-450)  k/uL


 


MPV     


 


Neutrophils %     %


 


Lymphocytes %     %


 


Monocytes %     %


 


Eosinophils %     %


 


Basophils %     %


 


Neutrophils #     (1.3-7.7)  k/uL


 


Lymphocytes #     (1.0-4.8)  k/uL


 


Monocytes #     (0-1.0)  k/uL


 


Eosinophils #     (0-0.7)  k/uL


 


Basophils #     (0-0.2)  k/uL


 


PT     (9.0-12.0)  sec


 


INR     (<1.2)  


 


APTT     (22.0-30.0)  sec


 


Sodium     (137-145)  mmol/L


 


Potassium     (3.5-5.1)  mmol/L


 


Chloride     ()  mmol/L


 


Carbon Dioxide     (22-30)  mmol/L


 


Anion Gap     mmol/L


 


BUN     (7-17)  mg/dL


 


Creatinine     (0.52-1.04)  mg/dL


 


Est GFR (CKD-EPI)AfAm     (>60 ml/min/1.73 sqM)  


 


Est GFR (CKD-EPI)NonAf     (>60 ml/min/1.73 sqM)  


 


Glucose     (74-99)  mg/dL


 


Plasma Lactic Acid Tomy  1.2    (0.7-2.0)  mmol/L


 


Calcium     (8.4-10.2)  mg/dL


 


Total Bilirubin     (0.2-1.3)  mg/dL


 


AST     (14-36)  U/L


 


ALT     (4-34)  U/L


 


Alkaline Phosphatase     ()  U/L


 


Creatine Kinase     ()  U/L


 


Troponin I   0.020   (0.000-0.034)  ng/mL


 


NT-Pro-B Natriuret Pep    6050  pg/mL


 


Total Protein     (6.3-8.2)  g/dL


 


Albumin     (3.5-5.0)  g/dL


 


Coronavirus (PCR)     (Not Detectd)  














  08/27/21 Range/Units





  12:17 


 


WBC   (3.8-10.6)  k/uL


 


RBC   (3.80-5.40)  m/uL


 


Hgb   (11.4-16.0)  gm/dL


 


Hct   (34.0-46.0)  %


 


MCV   (80.0-100.0)  fL


 


MCH   (25.0-35.0)  pg


 


MCHC   (31.0-37.0)  g/dL


 


RDW   (11.5-15.5)  %


 


Plt Count   (150-450)  k/uL


 


MPV   


 


Neutrophils %   %


 


Lymphocytes %   %


 


Monocytes %   %


 


Eosinophils %   %


 


Basophils %   %


 


Neutrophils #   (1.3-7.7)  k/uL


 


Lymphocytes #   (1.0-4.8)  k/uL


 


Monocytes #   (0-1.0)  k/uL


 


Eosinophils #   (0-0.7)  k/uL


 


Basophils #   (0-0.2)  k/uL


 


PT   (9.0-12.0)  sec


 


INR   (<1.2)  


 


APTT   (22.0-30.0)  sec


 


Sodium   (137-145)  mmol/L


 


Potassium   (3.5-5.1)  mmol/L


 


Chloride   ()  mmol/L


 


Carbon Dioxide   (22-30)  mmol/L


 


Anion Gap   mmol/L


 


BUN   (7-17)  mg/dL


 


Creatinine   (0.52-1.04)  mg/dL


 


Est GFR (CKD-EPI)AfAm   (>60 ml/min/1.73 sqM)  


 


Est GFR (CKD-EPI)NonAf   (>60 ml/min/1.73 sqM)  


 


Glucose   (74-99)  mg/dL


 


Plasma Lactic Acid Tomy   (0.7-2.0)  mmol/L


 


Calcium   (8.4-10.2)  mg/dL


 


Total Bilirubin   (0.2-1.3)  mg/dL


 


AST   (14-36)  U/L


 


ALT   (4-34)  U/L


 


Alkaline Phosphatase   ()  U/L


 


Creatine Kinase   ()  U/L


 


Troponin I   (0.000-0.034)  ng/mL


 


NT-Pro-B Natriuret Pep   pg/mL


 


Total Protein   (6.3-8.2)  g/dL


 


Albumin   (3.5-5.0)  g/dL


 


Coronavirus (PCR)  Not Detected  (Not Detectd)  














Disposition


Clinical Impression: 


 Weakness, Dyspnea, Congestive heart failure, Fall, Chronic renal failure, 

Hypokalemia, Elevated CPK, Transaminitis





Disposition: ADMITTED AS IP TO THIS [facility]


Condition: Fair


Is patient prescribed a controlled substance at d/c from ED?: No


Referrals: 


Esau Ashraf DO [Primary Care Provider] - 1-2 days


Time of Disposition: 14:37


Decision Date: 08/27/21


Decision Time: 14:37

## 2021-08-27 NOTE — XR
EXAMINATION TYPE: XR chest 2V

 

DATE OF EXAM: 8/27/2021

 

COMPARISON: Chest x-ray 2/9/2020

 

HISTORY: Difficulty breathing

 

TECHNIQUE:  Frontal and lateral views of the chest are obtained.

 

FINDINGS:  There is no focal air space opacity, pleural effusion, or pneumothorax seen.  The cardiac 
silhouette size is possibly enlarged although the patient is rotated in technique is apical lordotic.
  There is persistent elevation of right hemidiaphragm. There is improvement in the interstitium and 
prominence the central vascularity, central venous catheter has been removed. There are overlying mansi
ds. The osseous structures are intact.

 

IMPRESSION:  Interval improvement in aeration, patient's volume status. Heart size may be improved.

## 2021-08-28 NOTE — ECHOF
Referral Reason:Heart Failure



MEASUREMENTS

--------

HEIGHT: 160.0 cm

WEIGHT: 115.7 kg

BP: 

IVSd:   1.3 cm     (0.6 - 1.1)

LVIDd:   4.7 cm     (3.9 - 5.3)

LVPWd:   1.3 cm     (0.6 - 1.1)

IVSs:   1.6 cm

LVIDs:   2.9 cm

LVPWs:   1.9 cm

Ao Diam:   3.0 cm     (2.0 - 3.7)

AV Cusp:   1.6 cm     (1.5 - 2.6)

LA Diam:   3.4 cm     (2.7 - 3.8)

MV EXCURSION:   15.965 mm     (> 18.000)

MV EF SLOPE:   47 mm/s     (70 - 150)

EPSS:   2.3 cm

MV E Alexandre:   0.86 m/s

MV DecT:   158 ms

MV A Alexandre:   1.26 m/s

MV E/A Ratio:   0.68 

RAP:   5.00 mmHg

RVSP:   16.77 mmHg







FINDINGS

--------

This was a technically difficult study with suboptimal views.

The left ventricular size is normal.   There is moderate concentric left ventricular hypertrophy.   O
verall left ventricular systolic function is normal with, an EF between 55 - 60 %.

The right ventricle is normal in size.

The left atrial size is normal.

The right atrial size is normal.

Lumason used

The aortic valve is trileaflet and appears structurally normal.

The mitral valve is normal.   There is trace mitral regurgitation.

The tricuspid valve appears structurally normal.   Trace tricuspid regurgitation present.   Right shruthi
tricular systolic pressure is normal at < 35 mmHg.

There is no pulmonic regurgitation present.

The aortic root size is normal.

IVC Not well visulized.

There is no pericardial effusion.



CONCLUSIONS

--------

1. The left ventricular size is normal.

2. There is moderate concentric left ventricular hypertrophy.

3. Overall left ventricular systolic function is normal with, an EF between 55 - 60 %.

4. There is trace mitral regurgitation.

5. Trace tricuspid regurgitation present.

6. There is no pericardial effusion.





SONOGRAPHER: Christy Hurt [facility]

## 2022-03-01 ENCOUNTER — HOSPITAL ENCOUNTER (OUTPATIENT)
Dept: HOSPITAL 47 - RADCTMAIN | Age: 77
Discharge: HOME | End: 2022-03-01
Attending: PSYCHIATRY & NEUROLOGY
Payer: MEDICARE

## 2022-03-01 DIAGNOSIS — M47.812: Primary | ICD-10-CM

## 2022-03-01 DIAGNOSIS — I62.03: ICD-10-CM

## 2022-03-01 PROCEDURE — 70490 CT SOFT TISSUE NECK W/O DYE: CPT

## 2022-03-01 PROCEDURE — 70450 CT HEAD/BRAIN W/O DYE: CPT

## 2022-03-01 NOTE — CT
EXAMINATION TYPE: CT soft tissue neck wo con

 

DATE OF EXAM: 3/1/2022

 

HISTORY: Cervicalgia, MRI Clearance

 

COMPARISON: None available

 

CT DLP: 465.4 mGycm.  Automated Exposure Control for Dose Reduction was Utilized.

 

TECHNIQUE:  CT scan of the neck is performed without IV contrast administration. Axial images are obt
ained, coronal and sagittal reformatted images are reviewed.

 

FINDINGS: 

 

Unremarkable nasopharynx, oropharynx, hypopharynx, larynx and visualized portion of the trachea and e
sophagus. Faint millimetric calcification in the right thyroid lobe, please correlate with thyroid ul
trasound results. Symmetrical unremarkable parotid and submandibular salivary glands.

 

Scattered arterial atherosclerotic calcifications with tortuous neck arteries and medialization of th
e right common carotid artery seen indenting the right posterolateral aspect of the hypopharynx.

 

Scattered subcentimeter bilateral cervical and submandibular lymph nodes, nonspecific. No pathologica
lly enlarged lymph nodes in the chest. Chronic inflammatory changes of the right maxillary sinus. Hyp
opneumatized right mastoid cells. Degenerative changes at C4-5, C5-6 and C6-7 levels.

 

IMPRESSION:

No definite lesion or suspicious lymphadenopathy seen in the neck. Incidental findings as described a
caroline.

## 2022-03-01 NOTE — CT
EXAMINATION TYPE: CT brain wo con

 

DATE OF EXAM: 3/1/2022

 

COMPARISON: CT dated 8/27/2021

 

HISTORY: Cervicalgia, MRI Clearance

 

CT DLP: 1090.4 mGycm

Automated exposure control for dose reduction was used.

 

TECHNIQUE: CT scan of the brain is performed without IV contrast administration.

 

FINDINGS: 

Interval drainage of the previously seen right chronic subdural hematoma. Questionable minimal residu
al focal subdural collection measuring 2 mm without significant mass effect. Resolution of the previo
usly seen mass effect, midline shift and transtentorial herniation. Tiny right high frontal cortical 
area of encephalomalacia.

 

No acute intracranial hemorrhage or gross acute cortical infarct. Bilateral cerebral white matter hyp
odensities likely representing chronic microvascular ischemic changes. Scattered arterial atheroscler
otic calcifications. No ventricular obstruction. Unremarkable basal cisterns, sella and CP angles.

 

No gross space-occupying lesion, vasogenic edema or mass effect. No gross orbital abnormality. Chroni
c inflammatory changes of the right maxillary sinus. Opacified right mastoid air cells with sclerotic
 changes suggestive of chronic mastoiditis. Osteopenia. Right frontoparietal lee holes.

 

IMPRESSION: 

Interval drainage of the previously seen right chronic subdural hematoma with interval complete resol
ution of the previously seen mass effect, midline shift and transtentorial herniation. No acute intra
cranial hemorrhage or gross acute cortical infarct. Other chronic and incidental findings as describe
d above.

## 2022-03-03 ENCOUNTER — HOSPITAL ENCOUNTER (OUTPATIENT)
Dept: HOSPITAL 47 - RADMRIMAIN | Age: 77
Discharge: HOME | End: 2022-03-03
Attending: PSYCHIATRY & NEUROLOGY
Payer: MEDICARE

## 2022-03-03 DIAGNOSIS — R90.89: Primary | ICD-10-CM

## 2022-03-03 DIAGNOSIS — M54.2: ICD-10-CM

## 2022-03-03 PROCEDURE — 70551 MRI BRAIN STEM W/O DYE: CPT

## 2022-03-03 NOTE — MR
EXAMINATION TYPE: MR brain wo con

 

DATE OF EXAM: 3/3/2022

 

COMPARISON: CT dated 3/1/2022

 

INDICATION: 76-year-old female, head and neck pain, prior brain surgery for brain bleed.

 

TECHNIQUE: 

Standard multiplanar, multisequence MRI of the brain without IV contrast administration.  

 

FINDINGS: 

Brain volume loss changes, likely age-related. Bilateral cerebral white matter and pontine T2 and FLA
IR hyperintensities, likely representing chronic microvascular ischemic changes. Questionable small a
kaley of encephalomalacia in the right postcentral gyrus, possibly related to sequela of previous surge
ry. Partial empty sella. Questionable right pontine DVA.

 

No intracranial hemorrhage or acute infarct. No midline shift or herniation. Unremarkable basal ciste
rns and CP angles. No space-occupying lesion, vasogenic edema or mass effect. Preserved signal void p
attern of major intracranial vessels. No gross orbital abnormality.

 

Mucosal thickening of the right maxillary sinus with chronic inflammatory changes. Milder mucosal thi
ckening of the ethmoid air cells. Clear mastoid air cells. Right parietal lee holes. Tiny cyst along
 the medial aspect of the right atlantoaxial articulation.

 

IMPRESSION: 

Brain volume loss changes, likely age-related, and suspected bilateral cerebral white matter chronic 
microvascular ischemic changes as described above.

 

No intracranial hemorrhage or acute infarct. No space-occupying lesion by this nonenhanced MRI. Incid
ental findings as described above.

## 2022-11-22 ENCOUNTER — HOSPITAL ENCOUNTER (OUTPATIENT)
Dept: HOSPITAL 47 - RADMAMWWP | Age: 77
Discharge: HOME | End: 2022-11-22
Attending: FAMILY MEDICINE
Payer: MEDICARE

## 2022-11-22 DIAGNOSIS — Z90.721: ICD-10-CM

## 2022-11-22 DIAGNOSIS — Z78.0: ICD-10-CM

## 2022-11-22 DIAGNOSIS — Z12.31: Primary | ICD-10-CM

## 2022-11-22 PROCEDURE — 77063 BREAST TOMOSYNTHESIS BI: CPT

## 2022-11-22 PROCEDURE — 77067 SCR MAMMO BI INCL CAD: CPT

## 2022-11-23 NOTE — MM
Reason for Exam: Screening  (asymptomatic). 

Last mammogram was performed 1 year(s) and 7 month(s) ago. 





Patient History: 

Menarche at age 15. Left ovary removed at age 49. Right ovary removed at age 49. Hysterectomy at age

49. Postmenopausal. 





Risk Values: 

[place]a 5 year model risk: 1.2%.

NCI Lifetime model risk: 2.4%.





Prior Study Comparison: 

1/15/2019 Bilateral Screening Mammogram, [facility]. 1/31/2020 Bilateral Screening Mammogram, [facility].

4/20/2021 Bilateral Screening Mammogram, [facility]. 





Tissue Density: 

The breast tissue is heterogeneously dense. This may lower the sensitivity of mammography.





Findings: 

Analyzed By CAD. 

There is no suspicious group of microcalcifications or new suspicious mass in either breast. 





Overall Assessment: Benign, BI-RAD 2





Management: 

Screening Mammogram of both breasts in 1 year.

A clinical breast exam by your physician is recommended on an annual basis and results should be

correlated with mammographic findings.



Electronically signed and approved by: Leopold M. Fregoli, M.D. Radiologis

## 2022-12-01 ENCOUNTER — HOSPITAL ENCOUNTER (OUTPATIENT)
Dept: HOSPITAL 47 - ORWHC2ENDO | Age: 77
Discharge: HOME | End: 2022-12-01
Attending: SURGERY
Payer: MEDICARE

## 2022-12-01 VITALS — BODY MASS INDEX: 39.8 KG/M2

## 2022-12-01 VITALS — SYSTOLIC BLOOD PRESSURE: 143 MMHG | DIASTOLIC BLOOD PRESSURE: 85 MMHG | HEART RATE: 70 BPM

## 2022-12-01 VITALS — RESPIRATION RATE: 16 BRPM

## 2022-12-01 VITALS — TEMPERATURE: 97 F

## 2022-12-01 DIAGNOSIS — E78.5: ICD-10-CM

## 2022-12-01 DIAGNOSIS — Z83.3: ICD-10-CM

## 2022-12-01 DIAGNOSIS — I12.0: ICD-10-CM

## 2022-12-01 DIAGNOSIS — K21.9: ICD-10-CM

## 2022-12-01 DIAGNOSIS — J45.909: ICD-10-CM

## 2022-12-01 DIAGNOSIS — Z87.891: ICD-10-CM

## 2022-12-01 DIAGNOSIS — Z96.653: ICD-10-CM

## 2022-12-01 DIAGNOSIS — Z85.42: ICD-10-CM

## 2022-12-01 DIAGNOSIS — N18.6: ICD-10-CM

## 2022-12-01 DIAGNOSIS — K57.30: ICD-10-CM

## 2022-12-01 DIAGNOSIS — Z79.899: ICD-10-CM

## 2022-12-01 DIAGNOSIS — K64.1: ICD-10-CM

## 2022-12-01 DIAGNOSIS — Z90.89: ICD-10-CM

## 2022-12-01 DIAGNOSIS — Z98.84: ICD-10-CM

## 2022-12-01 DIAGNOSIS — Z90.710: ICD-10-CM

## 2022-12-01 DIAGNOSIS — D12.3: Primary | ICD-10-CM

## 2022-12-01 DIAGNOSIS — Z82.49: ICD-10-CM

## 2022-12-01 DIAGNOSIS — I25.2: ICD-10-CM

## 2022-12-01 DIAGNOSIS — E11.22: ICD-10-CM

## 2022-12-01 DIAGNOSIS — M19.90: ICD-10-CM

## 2022-12-01 DIAGNOSIS — Z86.010: ICD-10-CM

## 2022-12-01 DIAGNOSIS — Z98.51: ICD-10-CM

## 2022-12-01 DIAGNOSIS — D12.2: ICD-10-CM

## 2022-12-01 DIAGNOSIS — Z90.49: ICD-10-CM

## 2022-12-01 LAB — GLUCOSE BLD-MCNC: 129 MG/DL (ref 70–110)

## 2022-12-01 PROCEDURE — 88305 TISSUE EXAM BY PATHOLOGIST: CPT

## 2022-12-01 PROCEDURE — 43235 EGD DIAGNOSTIC BRUSH WASH: CPT

## 2022-12-01 PROCEDURE — 45385 COLONOSCOPY W/LESION REMOVAL: CPT

## 2022-12-01 PROCEDURE — 45380 COLONOSCOPY AND BIOPSY: CPT

## 2022-12-01 NOTE — P.PCN
Date of Procedure: 12/01/22


Description of Procedure: 




















PREOPERATIVE DIAGNOSIS:


Personal history of colon polyps


End-stage renal disease


High-risk colon polyps





POSTOPERATIVE DIAGNOSIS:


Tubular adenoma, ascending colon


Tubular adenoma transverse colon, multiple


Sigmoid diverticulosis, severe


Internal hemorrhoids, grade 2





OPERATION:


Colonoscopy to the ileocecal valve and appendiceal orifice, cecum


Colonoscopy with hot snare polypectomy


Colonoscopy with cold forceps biopsy





SURGEON: Ara Alfaro MD.





ANESTHESIA: MAC.





INDICATIONS:


The patient is an 77-year-old male who presents personal history of colon 

polyps.  Last colonoscopy less than 5 years.  Benefits and risks were described 

and informed consent was obtained.





DESCRIPTION OF PROCEDURE:


The patient had undergone Sutab prep.  The patient had been brought into the 

operating room and laid in the left lateral decubitus position.  After adequate 

intravenous sedation, the rectum was examined with 2% lidocaine jelly. External 

hemorrhoids were encountered. The rectal tone was within normal limits. No 

lesions were palpated in the rectal vault. An Olympus colonoscope was advanced 

until the cecum, ileocecal valve and appendiceal orifice were clearly viewed.  

The prep was good.  Sigmoid diverticulosis, severe, was encountered.  Colonic 

polyps were found and removed.  No evidence of focal colitis was found. 

Retroflexion of the scope demonstrated grade 2 internal hemorrhoids without 

active bleeding or inflammation. The colon was desufflated. The patient had tole

rated the procedure well. 





Withdrawal time was over 6 minutes.





FINDINGS:


Aronchick preparation quality scale 2 (1-5)


Internal hemorrhoids, grade 2


External hemorrhoids, grade 2.


No arteriovenous malformations.


Sigmoid diverticulosis, severe


Removal of 6 polyps:


- Snare polypectomy transverse colon 5, 5 to 20 mm tubulovillous adenoma 

polyps, piecemeal resection.


- Cold forceps biopsy at ascending colon, 4 mm polyp.


No focal colitis.





RECOMMENDATIONS:


For piecemeal resection of 20 mm,  tubulovillous adenoma repeat colonoscopy 6 

months, May 2023





Plan - Discharge Summary


New Discharge Prescriptions: 


Continue


   Furosemide [Lasix] 40 mg PO DAILY


   Levothyroxine Sodium [Synthroid] 112 mcg PO DAILY


   Rosuvastatin Calcium [Crestor] 10 mg PO HS


   Metoprolol Tartrate [Lopressor] 50 mg PO BID


   Albuterol Inhaler [Ventolin Hfa Inhaler] 2 puff INHALATION RT-QID PRN


     PRN Reason: Asthma


   Calcium Acetate 667 mg PO AS DIRECTED


   amLODIPine [Norvasc] 10 mg PO HS


   Ergocalciferol [Vitamin D2 (1250 Mcg = 52669 Iu)] 1,250 mcg PO MO


   lisinopriL 40 mg PO DAILY


   oxyCODONE HCL/ACETAMINOPHEN [Percocet 7.5-325 mg] 1 tab PO TID


   Nephro-Hyacinth Tab 1 tab PO HS


Discharge Medication List





Furosemide [Lasix] 40 mg PO DAILY 02/04/20 [History]


Levothyroxine Sodium [Synthroid] 112 mcg PO DAILY 02/04/20 [History]


Rosuvastatin Calcium [Crestor] 10 mg PO HS 02/04/20 [History]


Albuterol Inhaler [Ventolin Hfa Inhaler] 2 puff INHALATION RT-QID PRN 11/09/20 

[History]


Calcium Acetate 667 mg PO AS DIRECTED 11/09/20 [History]


Metoprolol Tartrate [Lopressor] 50 mg PO BID 11/09/20 [History]


amLODIPine [Norvasc] 10 mg PO HS 06/21/21 [History]


Ergocalciferol [Vitamin D2 (1250 Mcg = 05202 Iu)] 1,250 mcg PO MO 08/27/21 

[History]


Nephro-Hyacinth Tab 1 tab PO HS 11/28/22 [History]


lisinopriL 40 mg PO DAILY 11/28/22 [History]


oxyCODONE HCL/ACETAMINOPHEN [Percocet 7.5-325 mg] 1 tab PO TID 11/28/22 

[History]








Follow up Appointment(s)/Referral(s): 


Ara Alfaro MD [STAFF PHYSICIAN] - 12/20/22


Patient Instructions/Handouts:  *Sur[facility] - (Anesthesia) Endoscopy Discharge 

Instructions, Colonoscopy (DC), Upper Endoscopy (DC)


Activity/Diet/Wound Care/Special Instructions: 


Repeat colonoscopy 6 months, May 2022


Discharge Disposition: HOME SELF-CARE

## 2022-12-01 NOTE — P.GSHP
History of Present Illness


H&P Date: 12/01/22











CHIEF COMPLAINT: GERD and colon screen





HISTORY OF PRESENT ILLNESS: The patient is a 77-year-old female who


presents with gastroesophageal reflux disease and need for colon screen.  


Upper and lower endoscopy were offered for further evaluation and management.





PAST MEDICAL HISTORY: 


Please see list.





PAST SURGICAL HISTORY: 


Please see list.





MEDICATIONS: 


Please see list.





ALLERGIES:  Please see list. 





SOCIAL HISTORY: No illicit drug use





FAMILY HISTORY: No reports of Crohn disease or ulcerative colitis. 





REVIEW OF ORGAN SYSTEMS: 


CONSTITUTIONAL: No reports of fevers or chills. 


GI:  Denies any blood in stools or constipation. 





PHYSICAL EXAM: 


VITAL SIGNS:  Stable


GENERAL: Well-developed pleasant in no acute distress. 


HEENT: No scleral icterus. Extraocular movements grossly


intact. Moist buccal mucosa. 


NECK: Supple without lymphadenopathy. 


CHEST: Unlabored respirations. Equal bilateral excursions. 


CARDIOVASCULAR: Regular rate and rhythm. Distal 2+ pulses. 


ABDOMEN: Soft, nondistended.  


MUSCULOSKELETAL: No clubbing, cyanosis, or edema. 





ASSESSMENT: 


1.  Gastroesophageal reflux disease


2.  Colon screen.





PLAN: 


1. Recommend proceeding with an upper and lower endoscopy





Past Medical History


Past Medical History: Asthma, Cancer, Diabetes Mellitus, Dialysis, 

Hyperlipidemia, Hypertension, Myocardial Infarction (MI), Osteoarthritis (OA), 

Renal Disease, Thyroid Disorder


Additional Past Medical History / Comment(s): Hx Uterine cancer 25 yrs ago, 

hemodialysis Mon, Wed and Fri, varicose veins., "mild heart attack"" "brain 

bleed twice" chronic diarrhea, hx "14 polyps", "chronic back pain", anemia, 

currentl edema rufina feet, diet control diabetic


Last Myocardial Infarction Date:: unknown


History of Any Multi-Drug Resistant Organisms: None Reported


Past Surgical History: Appendectomy, Bariatric Surgery, Cholecystectomy, 

Hysterectomy, Joint Replacement, Tonsillectomy


Additional Past Surgical History / Comment(s): gastric bypass, surgery after 

fall -fx rt leg and foot, rufina knee replacement, rufina cataracts, surgery x 2 or 

"brain bleed", fistula for dialysis


Past Anesthesia/Blood Transfusion Reactions: Family History of Problems w/ 

Anesthesia


Additional Past Anesthesia/Blood Transfusion Reaction / Comment(s): MOTHER PONV


Smoking Status: Former smoker





- Past Family History


  ** Mother


Family Medical History: Cancer





  ** Father


Family Medical History: Diabetes Mellitus, Hypertension





  ** Sister(s)


Family Medical History: Cancer





Medications and Allergies


                                Home Medications











 Medication  Instructions  Recorded  Confirmed  Type


 


Furosemide [Lasix] 40 mg PO DAILY 02/04/20 11/28/22 History


 


Levothyroxine Sodium [Synthroid] 112 mcg PO DAILY 02/04/20 11/28/22 History


 


Rosuvastatin Calcium [Crestor] 10 mg PO HS 02/04/20 11/28/22 History


 


Albuterol Inhaler [Ventolin Hfa 2 puff INHALATION RT-QID PRN 11/09/20 11/28/22 

History





Inhaler]    


 


Calcium Acetate 667 mg PO AS DIRECTED 11/09/20 11/28/22 History


 


Metoprolol Tartrate [Lopressor] 50 mg PO BID 11/09/20 11/28/22 History


 


amLODIPine [Norvasc] 10 mg PO HS 06/21/21 11/28/22 History


 


Ergocalciferol [Vitamin D2 (1250 1,250 mcg PO MO 08/27/21 11/28/22 History





Mcg = 69788 Iu)]    


 


Nephro-Hyacinth Tab 1 tab PO HS 11/28/22 11/28/22 History


 


lisinopriL 40 mg PO DAILY 11/28/22 11/28/22 History


 


oxyCODONE HCL/ACETAMINOPHEN 1 tab PO TID 11/28/22 11/28/22 History





[Percocet 7.5-325 mg]    








                                    Allergies











Allergy/AdvReac Type Severity Reaction Status Date / Time


 


naproxen [From Naprosyn] Allergy  Dyspnea Verified 11/28/22 15:31


 


blood thinner Allergy  "brain Uncoded 11/28/22 15:31





   bleed"  














Surgical - Exam


                                   Vital Signs











Temp Pulse Resp BP Pulse Ox


 


 97 F L  58 L  18   170/76   95 


 


 12/01/22 07:25  12/01/22 07:25  12/01/22 07:25  12/01/22 07:25  12/01/22 07:25














Results





- Labs


                  Abnormal Lab Results - Last 24 Hours (Table)











  12/01/22 Range/Units





  07:30 


 


POC Glucose (mg/dL)  129 H  ()  mg/dL

## 2022-12-01 NOTE — P.PCN
Date of Procedure: 12/01/22


Description of Procedure: 











PREOPERATIVE DIAGNOSES:


1.  Dysphagia


2.  Nausea and vomiting. 


3.  History of gastric bypass.


4.  History of gastric ulcers.





POSTOPERATIVE DIAGNOSES: 


1.  Dysphagia


2.  Nausea and vomiting. 


3.  History of gastric bypass.


4.  History of gastric ulcers.





PROCEDURE PERFORMED:  Esophagogastrojejunoscopy. 





SURGEON: Ara Alfaro MD 





ANESTHESIA: MAC. 





INDICATIONS: The patient is a 77-year-old female with prior history of Isaiah-en-Y

gastric bypass over 10 years ago.  She has dysphagia with nausea and vomiting.  

With history of Isaiah-en-Y gastric bypass, upper endoscopy was offered for 

further evaluation and management. 





DESCRIPTION: Patient was brought to the endoscopy suite and laid in the


left lateral decubitus position. After adequate IV sedation, a bite


block was placed. An Olympus gastroscope was passed along the posterior


oropharynx down to the distal esophagus where the squamocolumnar


junction was found at approximately 38 cm from the incisors.  Gastric pouch of 5

cm was identified unremarkable.  


No evidence of foreign body was found. No evidence of


active gastrojejunal ulcerations were encountered. The GI tract was


desufflated. The patient tolerated the procedure well. 





FINDINGS: 


1. No acute gastrojejunal ulceration. 


2. No foreign body found along the anastomosis. 





PLAN: 


1.  Recommend upper endoscopy as needed.

## 2023-01-07 ENCOUNTER — HOSPITAL ENCOUNTER (EMERGENCY)
Dept: HOSPITAL 47 - EC | Age: 78
Discharge: HOME | End: 2023-01-07
Payer: MEDICARE

## 2023-01-07 VITALS
DIASTOLIC BLOOD PRESSURE: 71 MMHG | TEMPERATURE: 98.8 F | HEART RATE: 70 BPM | RESPIRATION RATE: 18 BRPM | SYSTOLIC BLOOD PRESSURE: 150 MMHG
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
## 2023-01-07 DIAGNOSIS — I25.2: ICD-10-CM

## 2023-01-07 DIAGNOSIS — Z88.6: ICD-10-CM

## 2023-01-07 DIAGNOSIS — E78.5: ICD-10-CM

## 2023-01-07 DIAGNOSIS — Z79.899: ICD-10-CM

## 2023-01-07 DIAGNOSIS — M54.6: Primary | ICD-10-CM

## 2023-01-07 DIAGNOSIS — I10: ICD-10-CM

## 2023-01-07 DIAGNOSIS — Z88.8: ICD-10-CM

## 2023-01-07 DIAGNOSIS — J45.909: ICD-10-CM

## 2023-01-07 DIAGNOSIS — Z87.891: ICD-10-CM

## 2023-01-07 PROCEDURE — 71111 X-RAY EXAM RIBS/CHEST4/> VWS: CPT

## 2023-01-07 PROCEDURE — 99284 EMERGENCY DEPT VISIT MOD MDM: CPT

## 2023-01-07 PROCEDURE — 72072 X-RAY EXAM THORAC SPINE 3VWS: CPT

## 2023-01-07 PROCEDURE — 96374 THER/PROPH/DIAG INJ IV PUSH: CPT

## 2023-01-07 PROCEDURE — 96376 TX/PRO/DX INJ SAME DRUG ADON: CPT

## 2023-01-07 NOTE — XR
EXAMINATION TYPE: XR ribs bilat w pa chest xray

 

DATE OF EXAM: 1/7/2023

 

COMPARISON: 8/27/2021

 

HISTORY: Chest pain

 

TECHNIQUE: 9 views

 

FINDINGS: There is 13 x 3 cm geometric density over the left midlung field and is outside of the ches
t and could be artifact or chest wall foreign body. The heart size is normal. There are some intersti
tial infiltrates and subsegmental atelectasis in the mid and lower lung fields. No rib fractures seen
. No pneumothorax.

 

IMPRESSION: There are interstitial infiltrates and atelectasis in the lung fields which are new erica
red to old exam. Artifact or foreign body on the left posterior chest. No rib fracture.

## 2023-01-07 NOTE — ED
General Adult HPI





- General


Chief complaint: Back Pain/Injury


Stated complaint: back pain


Time Seen by Provider: 01/07/23 13:38


Source: patient, EMS, RN notes reviewed


Mode of arrival: EMS


Limitations: no limitations





- History of Present Illness


Initial comments: 





Patient is a pleasant 77-year-old female presenting to the emergency department 

with concerns with back pain.  Onset of symptoms was prior to arrival.  Patient 

was grabbing her walker out of the car.  Patient turned with sudden discomfort 

of thoracic back pain.  Patient states discomfort increases greatly with 

movement.  Patient has chronic lower back pain however no history of thoracic 

back pain.  No direct trauma.  Patient states it hurts to take a deep breath 

however no dyspnea.  No chest pain.  No abdominal pain.  No weakness.  No 

incontinence or retention of bowel or bladder





- Related Data


                                Home Medications











 Medication  Instructions  Recorded  Confirmed


 


Furosemide [Lasix] 40 mg PO DAILY 02/04/20 01/07/23


 


Levothyroxine Sodium [Synthroid] 112 mcg PO AC-BRKFST 02/04/20 01/07/23


 


Rosuvastatin Calcium [Crestor] 10 mg PO HS 02/04/20 01/07/23


 


Calcium Acetate 667 mg PO ACHS 11/09/20 01/07/23


 


Metoprolol Tartrate [Lopressor] 50 mg PO HS 11/09/20 01/07/23


 


amLODIPine [Norvasc] 10 mg PO HS 06/21/21 01/07/23


 


Ergocalciferol [Vitamin D2 (1250 1,250 mcg PO MO 08/27/21 01/07/23





Mcg = 86848 Iu)]   


 


Nephro-Hyacinth Tab 1 tab PO HS 11/28/22 01/07/23


 


lisinopriL 40 mg PO DAILY 11/28/22 01/07/23


 


Metoprolol Tartrate [Lopressor] 25 mg PO DAILY 01/07/23 01/07/23


 


oxyCODONE-APAP 10-325MG [Percocet 1 tab PO TID PRN 01/07/23 01/07/23





 mg]   








                                  Previous Rx's











 Medication  Instructions  Recorded


 


Cyclobenzaprine [Flexeril] 10 mg PO TID PRN #12 tablet 01/07/23


 


methylPREDNISolone Dose Pack 4 mg PO AS DIRECTED #21 tab 01/07/23





[Medrol Dose Pack]  











                                    Allergies











Allergy/AdvReac Type Severity Reaction Status Date / Time


 


naproxen [From Naprosyn] Allergy  Dyspnea Verified 01/07/23 15:09


 


blood thinner Allergy  "brain Uncoded 01/07/23 15:09





   bleed"  














Review of Systems


ROS Statement: 


Those systems with pertinent positive or pertinent negative responses have been 

documented in the HPI.





ROS Other: All systems not noted in ROS Statement are negative.


Constitutional: Denies: fever


Eyes: Denies: eye pain


ENT: Denies: ear pain


Respiratory: Denies: dyspnea


Cardiovascular: Denies: chest pain


Gastrointestinal: Denies: abdominal pain


Genitourinary: Denies: dysuria


Musculoskeletal: Reports: as per HPI


Skin: Denies: rash


Neurological: Denies: weakness





Past Medical History


Past Medical History: Asthma, Cancer, Diabetes Mellitus, Dialysis, 

Hyperlipidemia, Hypertension, Myocardial Infarction (MI), Osteoarthritis (OA), R

enal Disease, Thyroid Disorder


Additional Past Medical History / Comment(s): Hx Uterine cancer 25 yrs ago, 

hemodialysis Mon, Wed and Fri, varicose veins., "mild heart attack"" "brain 

bleed twice" chronic diarrhea, hx "14 polyps", "chronic back pain", anemia, 

currentl edema rufina feet, diet control diabetic


Last Myocardial Infarction Date:: unknown


History of Any Multi-Drug Resistant Organisms: None Reported


Past Surgical History: Appendectomy, Bariatric Surgery, Cholecystectomy, 

Hysterectomy, Joint Replacement, Tonsillectomy


Additional Past Surgical History / Comment(s): gastric bypass, surgery after 

fall -fx rt leg and foot, rufina knee replacement, rufina cataracts, surgery x 2 or 

"brain bleed", fistula for dialysis


Past Anesthesia/Blood Transfusion Reactions: Family History of Problems w/ 

Anesthesia


Additional Past Anesthesia/Blood Transfusion Reaction / Comment(s): MOTHER PONV


Smoking Status: Former smoker





- Past Family History


  ** Mother


Family Medical History: Cancer





  ** Father


Family Medical History: Diabetes Mellitus, Hypertension





  ** Sister(s)


Family Medical History: Cancer





General Exam


Limitations: no limitations


General appearance: alert, in no apparent distress


Head exam: Present: normocephalic


Eye exam: Present: normal appearance


Neck exam: Present: normal inspection.  Absent: tenderness


Respiratory exam: Present: normal lung sounds bilaterally


Cardiovascular Exam: Present: regular rate, normal rhythm


  ** Expanded


Peripheral pulses: 2+: Posterior Tibialis (R), Posterior Tibialis (L)


GI/Abdominal exam: Present: soft.  Absent: distended, tenderness, pulsatile mass


Extremities exam: Present: normal inspection, full ROM.  Absent: tenderness


Back exam: Present: tenderness (T5 to T7 region as well as lateral to this)


Neurological exam: Present: alert.  Absent: motor sensory deficit


  ** Expanded


Sensory exam: Lower Extremity Light Touch: Normal


Motor strength exam: RUE: 5, LUE: 5, RLE: 5, LLE: 5


Psychiatric exam: Present: normal affect, normal mood


Skin exam: Present: normal color





Course


                                   Vital Signs











  01/07/23





  13:37


 


Temperature 97.7 F


 


Pulse Rate 65


 


Respiratory 20





Rate 


 


Blood Pressure 141/62


 


O2 Sat by Pulse 97





Oximetry 














Medical Decision Making





- Medical Decision Making





Was pt. sent in by a medical professional or institution (, PA, NP, urgent 

care, hospital, or nursing home...) When possible be specific


@  -No


Did you speak to anyone other than the patient for history (EMS, parent, family,

police, friend...)? What history was obtained from this source 


@  -No


Did you review nursing and triage notes (agree or disagree)?  Why? 


@  -I reviewed and agree with nursing and triage notes


Were old charts reviewed (outside hosp., previous admission, EMS record, old 

EKG, old radiological studies, urgent care reports/EKG's, nursing home records)?

Report findings 


@  -No old charts were reviewed


Differential Diagnosis (chest pain, altered mental status, abdominal pain women,

abdominal pain men, vaginal bleeding, weakness, fever, dyspnea, syncope, headach

e, dizziness, GI bleed, back pain, seizure, CVA, palpatations, mental health)? 


@  -Differential Back Pain:


Strain, zoster, cauda equina syndrome, epidural abscess, vertebral osteom

yelitis, discitis, fracture, subluxation, disc herniation, DJD, spinal stenosis,

dissection, AAA, pancreatitis, peptic ulcer disease, pyelonephritis, kidney 

stone, this is not meant to be an all-inclusive list.


EKG interpreted by me (3pts min.).


@  -As above


X-rays interpreted by me (1pt min.).


@  -As above


CT interpreted by me (1pt min.).


@  -None done


U/S interpreted by me (1pt. min.).


@  -None done


What testing was considered but not performed or refused? (CT, X-rays, U/S, 

labs)? Why?


@  -None


What meds were considered but not given or refused? Why?


@  -None


Did you discuss the management of the patient with other professionals 

(professionals i.e. , PA, NP, lab, RT, psych nurse, , , 

teacher, , )? Give summary


@  -No


Was smoking cessation discussed for >3mins.?


@  -No


Was critical care preformed (if so, how long)?


@  -No


Were there social determinants of health that impacted care today? How? 

(Homelessness, low income, unemployed, alcoholism, drug addiction, 

transportation, low edu. Level, literacy, decrease access to med. care, detention, 

rehab)?


@  -No


Was there de-escalation of care discussed even if they declined (Discuss DNR or 

withdrawal of care, Hospice)? DNR status


@  -No


What co-morbidities impacted this encounter? (DM, HTN, Smoking, COPD, CAD, 

Cancer, CVA, ARF, Chemo, Hep., AIDS, mental health diagnosis, sleep apnea, 

morbid obesity)?


@  -None


Was patient admitted / discharged? Hospital course, mention meds given and 

route, prescriptions, significant lab abnormalities, going to OR and other 

pertinent info.


@  -Patient will be discharged.  Patient was reevaluated and improved.  Patient 

is receptive to more medication prior to discharge.  A shin with a prescription 

for medicine at home as well.  Patient will have somebody pick her up.


Undiagnosed new problem with uncertain prognosis?


@  -No


Drug Therapy requiring intensive monitoring for toxicity (Heparin, Nitro, 

Insulin, Cardizem)?


@  -No


Were any procedures done?


@  -No


Diagnosis/symptom?


@  -Thoracic back pain


Acute, or Chronic, or Acute on Chronic?


@  -Acute


Uncomplicated (without systemic symptoms) or Complicated (systemic symptoms)?


@  -Uncomplicated


Side effects of treatment?


@  -No


Exacerbation, Progression, or Severe Exacerbation?


@  -No


Poses a threat to life or bodily function? How? (Chest pain, USA, MI, pneumonia,

PE, COPD, DKA, ARF, appy, cholecystitis, CVA, Diverticulitis, Homicidal, 

Suicidal, threat to staff... and all critical care pts)


@  -No





- Radiology Data


Interpreted by me: 





Bilateral ribs and chest x-ray show some increased interstitial marking.  No 

fracture.  Thoracic spine shows no evidence of fracture.  There is concern for 

possible foreign body





Disposition


Clinical Impression: 


 Thoracic back pain





Disposition: HOME SELF-CARE


Condition: Stable


Instructions (If sedation given, give patient instructions):  Thoracic Back 

Strain (ED)


Additional Instructions: 


Prescriptions have been sent to pharmacy.  Please do follow-up with primary care

physician in the next day or 2 for recheck.  Return for weakness, unable to take

care of self, loss of control of bowel or bladder, worsening symptoms or other 

concerns.


Prescriptions: 


Cyclobenzaprine [Flexeril] 10 mg PO TID PRN #12 tablet


 PRN Reason: Pain


methylPREDNISolone Dose Pack [Medrol Dose Pack] 4 mg PO AS DIRECTED #21 tab


Is patient prescribed a controlled substance at d/c from ED?: No


Referrals: 


Esau Ashraf DO [Primary Care Provider] - 1-2 days


Time of Disposition: 15:56

## 2023-01-07 NOTE — XR
EXAMINATION TYPE: XR thoracic spine complete

 

DATE OF EXAM: 1/7/2023

 

COMPARISON: NONE

 

HISTORY: Back pain

 

TECHNIQUE: 3 views

 

FINDINGS: The thoracic vertebra have normal alignment. Posterior elements are intact. There is no par
aspinal mass. There is some spurring in the mid and lower thoracic spine.

 

IMPRESSION: Spondylotic changes in the thoracic spine. No fracture. Foreign body seen over the left p
osterior chest wall.

## 2023-06-12 ENCOUNTER — HOSPITAL ENCOUNTER (OUTPATIENT)
Dept: HOSPITAL 47 - EC | Age: 78
Setting detail: OBSERVATION
LOS: 3 days | Discharge: HOME | End: 2023-06-15
Attending: HOSPITALIST | Admitting: HOSPITALIST
Payer: MEDICARE

## 2023-06-12 DIAGNOSIS — Z98.41: ICD-10-CM

## 2023-06-12 DIAGNOSIS — Z98.42: ICD-10-CM

## 2023-06-12 DIAGNOSIS — J44.1: Primary | ICD-10-CM

## 2023-06-12 DIAGNOSIS — Z90.49: ICD-10-CM

## 2023-06-12 DIAGNOSIS — Z85.42: ICD-10-CM

## 2023-06-12 DIAGNOSIS — I25.2: ICD-10-CM

## 2023-06-12 DIAGNOSIS — N18.6: ICD-10-CM

## 2023-06-12 DIAGNOSIS — Z79.890: ICD-10-CM

## 2023-06-12 DIAGNOSIS — Z83.3: ICD-10-CM

## 2023-06-12 DIAGNOSIS — Z80.9: ICD-10-CM

## 2023-06-12 DIAGNOSIS — I83.90: ICD-10-CM

## 2023-06-12 DIAGNOSIS — Z86.73: ICD-10-CM

## 2023-06-12 DIAGNOSIS — Z99.2: ICD-10-CM

## 2023-06-12 DIAGNOSIS — Z82.49: ICD-10-CM

## 2023-06-12 DIAGNOSIS — Z79.899: ICD-10-CM

## 2023-06-12 DIAGNOSIS — Z87.891: ICD-10-CM

## 2023-06-12 DIAGNOSIS — I12.0: ICD-10-CM

## 2023-06-12 DIAGNOSIS — Z98.84: ICD-10-CM

## 2023-06-12 DIAGNOSIS — Z20.822: ICD-10-CM

## 2023-06-12 DIAGNOSIS — Z96.653: ICD-10-CM

## 2023-06-12 LAB
ALBUMIN SERPL-MCNC: 3.5 G/DL (ref 3.5–5)
ALP SERPL-CCNC: 136 U/L (ref 38–126)
ALT SERPL-CCNC: 22 U/L (ref 4–34)
ANION GAP SERPL CALC-SCNC: 12 MMOL/L
APTT BLD: 21.5 SEC (ref 22–30)
AST SERPL-CCNC: 38 U/L (ref 14–36)
BASOPHILS # BLD AUTO: 0 K/UL (ref 0–0.2)
BASOPHILS NFR BLD AUTO: 0 %
BUN SERPL-SCNC: 42 MG/DL (ref 7–17)
CALCIUM SPEC-MCNC: 8.6 MG/DL (ref 8.4–10.2)
CHLORIDE SERPL-SCNC: 105 MMOL/L (ref 98–107)
CO2 SERPL-SCNC: 16 MMOL/L (ref 22–30)
EOSINOPHIL # BLD AUTO: 0.2 K/UL (ref 0–0.7)
EOSINOPHIL NFR BLD AUTO: 4 %
ERYTHROCYTE [DISTWIDTH] IN BLOOD BY AUTOMATED COUNT: 3.3 M/UL (ref 3.8–5.4)
ERYTHROCYTE [DISTWIDTH] IN BLOOD: 13.5 % (ref 11.5–15.5)
GLUCOSE SERPL-MCNC: 116 MG/DL (ref 74–99)
HCT VFR BLD AUTO: 32.1 % (ref 34–46)
HGB BLD-MCNC: 10.6 GM/DL (ref 11.4–16)
INR PPP: 0.9 (ref ?–1.2)
LYMPHOCYTES # SPEC AUTO: 0.9 K/UL (ref 1–4.8)
LYMPHOCYTES NFR SPEC AUTO: 15 %
MCH RBC QN AUTO: 32.1 PG (ref 25–35)
MCHC RBC AUTO-ENTMCNC: 33 G/DL (ref 31–37)
MCV RBC AUTO: 97.3 FL (ref 80–100)
MONOCYTES # BLD AUTO: 0.4 K/UL (ref 0–1)
MONOCYTES NFR BLD AUTO: 6 %
NEUTROPHILS # BLD AUTO: 4.1 K/UL (ref 1.3–7.7)
NEUTROPHILS NFR BLD AUTO: 72 %
PLATELET # BLD AUTO: 186 K/UL (ref 150–450)
POTASSIUM SERPL-SCNC: 4.3 MMOL/L (ref 3.5–5.1)
PROT SERPL-MCNC: 6.5 G/DL (ref 6.3–8.2)
PT BLD: 9.9 SEC (ref 9–12)
SODIUM SERPL-SCNC: 133 MMOL/L (ref 137–145)
WBC # BLD AUTO: 5.7 K/UL (ref 3.8–10.6)

## 2023-06-12 PROCEDURE — 99285 EMERGENCY DEPT VISIT HI MDM: CPT

## 2023-06-12 PROCEDURE — 87636 SARSCOV2 & INF A&B AMP PRB: CPT

## 2023-06-12 PROCEDURE — 96372 THER/PROPH/DIAG INJ SC/IM: CPT

## 2023-06-12 PROCEDURE — 96374 THER/PROPH/DIAG INJ IV PUSH: CPT

## 2023-06-12 PROCEDURE — 94640 AIRWAY INHALATION TREATMENT: CPT

## 2023-06-12 PROCEDURE — 85610 PROTHROMBIN TIME: CPT

## 2023-06-12 PROCEDURE — 85730 THROMBOPLASTIN TIME PARTIAL: CPT

## 2023-06-12 PROCEDURE — 85025 COMPLETE CBC W/AUTO DIFF WBC: CPT

## 2023-06-12 PROCEDURE — 83036 HEMOGLOBIN GLYCOSYLATED A1C: CPT

## 2023-06-12 PROCEDURE — 83880 ASSAY OF NATRIURETIC PEPTIDE: CPT

## 2023-06-12 PROCEDURE — 93005 ELECTROCARDIOGRAM TRACING: CPT

## 2023-06-12 PROCEDURE — 96376 TX/PRO/DX INJ SAME DRUG ADON: CPT

## 2023-06-12 PROCEDURE — 86706 HEP B SURFACE ANTIBODY: CPT

## 2023-06-12 PROCEDURE — 36415 COLL VENOUS BLD VENIPUNCTURE: CPT

## 2023-06-12 PROCEDURE — 84484 ASSAY OF TROPONIN QUANT: CPT

## 2023-06-12 PROCEDURE — 96375 TX/PRO/DX INJ NEW DRUG ADDON: CPT

## 2023-06-12 PROCEDURE — 80053 COMPREHEN METABOLIC PANEL: CPT

## 2023-06-12 PROCEDURE — 90935 HEMODIALYSIS ONE EVALUATION: CPT

## 2023-06-12 PROCEDURE — 71046 X-RAY EXAM CHEST 2 VIEWS: CPT

## 2023-06-12 PROCEDURE — 87340 HEPATITIS B SURFACE AG IA: CPT

## 2023-06-12 PROCEDURE — 83605 ASSAY OF LACTIC ACID: CPT

## 2023-06-12 RX ADMIN — METOPROLOL TARTRATE SCH MG: 25 TABLET, FILM COATED ORAL at 19:56

## 2023-06-12 RX ADMIN — METHYLPREDNISOLONE SODIUM SUCCINATE SCH MG: 40 INJECTION, POWDER, FOR SOLUTION INTRAMUSCULAR; INTRAVENOUS at 21:14

## 2023-06-12 RX ADMIN — ENOXAPARIN SODIUM SCH MG: 30 INJECTION SUBCUTANEOUS at 21:20

## 2023-06-12 RX ADMIN — ATORVASTATIN CALCIUM SCH MG: 20 TABLET, FILM COATED ORAL at 19:56

## 2023-06-12 RX ADMIN — OXYCODONE HYDROCHLORIDE AND ACETAMINOPHEN PRN EACH: 10; 325 TABLET ORAL at 23:44

## 2023-06-12 RX ADMIN — BUDESONIDE SCH MG: 1 SUSPENSION RESPIRATORY (INHALATION) at 21:40

## 2023-06-12 RX ADMIN — DOXYCYCLINE SCH MG: 100 CAPSULE ORAL at 21:20

## 2023-06-12 RX ADMIN — OXYCODONE HYDROCHLORIDE AND ACETAMINOPHEN PRN EACH: 10; 325 TABLET ORAL at 19:55

## 2023-06-12 RX ADMIN — FORMOTEROL FUMARATE DIHYDRATE SCH MCG: 20 SOLUTION RESPIRATORY (INHALATION) at 21:39

## 2023-06-12 RX ADMIN — LORATADINE, PSEUDOEPHEDRINE SULFATE SCH EACH: 5; 120 TABLET, FILM COATED, EXTENDED RELEASE ORAL at 21:27

## 2023-06-12 RX ADMIN — Medication SCH EACH: at 19:57

## 2023-06-12 RX ADMIN — METHYLPREDNISOLONE SODIUM SUCCINATE SCH MG: 40 INJECTION, POWDER, FOR SOLUTION INTRAMUSCULAR; INTRAVENOUS at 23:25

## 2023-06-12 RX ADMIN — Medication SCH MG: at 16:16

## 2023-06-12 RX ADMIN — IPRATROPIUM BROMIDE AND ALBUTEROL SULFATE SCH ML: .5; 3 SOLUTION RESPIRATORY (INHALATION) at 21:40

## 2023-06-12 NOTE — P.HPIM
History of Present Illness


H&P Date: 06/12/23


Chief Complaint: Short of breath





History of presenting complaint:


This is a pleasant 77 a patient of Dr. Ashraf.  Chronic stable medical 

conditions include hypertension, hyperlipidemia, depression, anxiety, 

osteoarthritis.  End-stage kidney disease on hemodialysis.


Patient presents to the ER with worsening short of breath.  Cough.  Some nasal 

drainage.  Relevant to current of which is not known.  No obvious fever and 

chills.  Appetite is fair.  Some edema.  Did not get hemodialysis today.  No 

chest pain.





Review of systems:


GEN.:  Tired


EYES: None


HEENT: None


NECK: None


RESPIRATORY: As above


CARDIOVASCULAR: Edema


GASTROINTESTINAL: None


GENITOURINARY: None


MUSCULOSKELETAL: Joint pains


LYMPHATICS: None


HEMATOLOGICAL: None  


PSYCHIATRY: Anxious


NEUROLOGICAL: No focal currently





Past medical history to include:


Hypertension, hyperlipidemia, depression, anxiety, osteoarthritis, end-stage 

kidney disease on hemodialysis, uterine cancer, bariatric surgery, depression, 

osteoarthritis





Social history:


Quit smoking over 30 years ago.  Lives alone





Physical examination:


VITAL SIGNS: 98.5, 70, 24, 139/69, 99% room air


GENERAL: BMI 42.5, sitting on bed, short of breath.


EYES: Pupils equal.  Conjunctiva normal.


HEENT: External appearance of nose and ears normal, oral cavity grossly normal.


NECK: JVD not raised; masses not palpable.


HEART: First and second heart sounds are normal; some edema.  


LUNGS: Respiratory rate increased; wheezing and some crackles.  


ABDOMEN: Soft,  nontender, liver spleen not palpable, no masses palpable.  


PSYCH: Alert and oriented x3;  mood  and affect anxiousl.  


MUSCULOSKELETAL:No Clubbing/cyanosis;muscles-grossly intact.  OA


NEUROLOGICAL: Cranial nerves grossly intact; no facial asymmetry,   power and 

sensation grossly intact. 


LYMPHATICS: No lymph nodes palpable in the axilla and neck











INVESTIGATIONS, reviewed in the clinical context:


White count 5.7 hemoglobin 10.6 platelets 186 sodium 133 potassium 4.3 BUN 42 

creatinine 5.47


Influenza type A, B, RSV, COVID-19: Not detected


EKG tracing personally reviewed by me-normal sinus rhythm.  Anterior leads poor 

R-wave progression


Chest x-ray film personally reviewed by me-right diaphragm elevated.  Some 

venous prominence





Assessment and plan:





-Acute COPD exacerbation in a prior smoker


DuoNeb 4 times a day, nebulized Perforomist, Pulmicort.  IV sodium Medrol.





-Postnasal drainage.


Claritin-D





-Acute fluid overload from incomplete hemodialysis/pulmonary edema


Hemodialysis.





-Anemia of chronic kidney disease








-End-stage kidney disease on hemodialysis.  From diabetic nephropathy


Follow with nephrology








-Hyperlipidemia Crestor 10 mg daily at bedtime





-Essential hypertension, 


Lisinopril 40 mg daily, Norvasc 10 mg daily, Lopressor 25 mg twice a day








-Primary osteoarthritis


Tylenol as needed





-Morbid obesity BMI 2.5


Weight loss measures








-DO NOT RESUSCITATE





Bronchodilators, nebulized Perforomist and Pulmicort.  I sodium Medrol.  Resume 

home medications.  Nephrology consulted.





Past Medical History


Past Medical History: Asthma, Cancer, Diabetes Mellitus, Dialysis, 

Hyperlipidemia, Hypertension, Myocardial Infarction (MI), Osteoarthritis (OA), 

Pneumonia, Renal Disease, Thyroid Disorder


Additional Past Medical History / Comment(s): Hx Uterine cancer 25 yrs ago, 

hemodialysis Mon, Wed and Fri, varicose veins., "mild heart attack"" "brain 

bleed twice" chronic diarrhea, hx "14 polyps", "chronic back pain", anemia, 

currentl edema rufina feet, diet control diabetic


Last Myocardial Infarction Date:: unknown


History of Any Multi-Drug Resistant Organisms: None Reported


Past Surgical History: Appendectomy, Bariatric Surgery, Cholecystectomy, 

Hysterectomy, Joint Replacement, Tonsillectomy


Additional Past Surgical History / Comment(s): gastric bypass, surgery after 

fall -fx rt leg and foot, rufina knee replacement, rufina cataracts, surgery x 2 or 

"brain bleed", fistula for dialysis


Past Anesthesia/Blood Transfusion Reactions: Family History of Problems w/ 

Anesthesia


Additional Past Anesthesia/Blood Transfusion Reaction / Comment(s): MOTHER PONV


Past Psychological History: No Psychological Hx Reported


Smoking Status: Former smoker


Past Alcohol Use History: None Reported


Past Drug Use History: None Reported





- Past Family History


  ** Mother


Family Medical History: Cancer





  ** Father


Family Medical History: Diabetes Mellitus, Hypertension





  ** Sister(s)


Family Medical History: Cancer





Medications and Allergies


                                Home Medications











 Medication  Instructions  Recorded  Confirmed  Type


 


Furosemide [Lasix] 40 mg PO DAILY 02/04/20 06/12/23 History


 


Levothyroxine Sodium [Synthroid] 112 mcg PO AC-BRKFST 02/04/20 06/12/23 History


 


Rosuvastatin Calcium [Crestor] 10 mg PO HS 02/04/20 06/12/23 History


 


Calcium Acetate 1,334 mg PO AC-TID 11/09/20 06/12/23 History


 


Metoprolol Tartrate [Lopressor] 25 mg PO BID 11/09/20 06/12/23 History


 


amLODIPine [Norvasc] 10 mg PO DAILY 06/21/21 06/12/23 History


 


Ergocalciferol [Vitamin D2 (1250 1,250 mcg PO MO 08/27/21 06/12/23 History





Mcg = 17545 Iu)]    


 


Nephro-Hyacinth Tab 1 tab PO HS 11/28/22 06/12/23 History


 


lisinopriL 40 mg PO DAILY 11/28/22 06/12/23 History


 


oxyCODONE-APAP 10-325MG [Percocet 1 tab PO TID PRN 01/07/23 06/12/23 History





 mg]    


 


Albuterol Inhaler [Ventolin Hfa 2 puff INHALATION RT-QID PRN 05/04/23 06/12/23 

History





Inhaler]    


 


Albuterol Nebulized [Ventolin 2.5 mg INHALATION RT-QID PRN 05/04/23 06/12/23 

History





Nebulized]    


 


Docusate [Colace] 100 mg PO Q4H PRN 05/04/23 06/12/23 History


 


Lidocaine-Prilocaine Cream [Emla 1 applic TOPICAL DAILY PRN 05/04/23 06/12/23 

History





Cream 2.5%/2.5%]    


 


hydrALAZINE HCL [Apresoline] 25 mg PO HS 06/12/23 06/12/23 History








                                    Allergies











Allergy/AdvReac Type Severity Reaction Status Date / Time


 


naproxen [From Naprosyn] Allergy  Dyspnea Verified 06/12/23 11:33


 


blood thinner Allergy  "brain Uncoded 06/12/23 11:33





   bleed"  














Physical Exam


Vitals: 


                                   Vital Signs











  Temp Pulse Resp BP Pulse Ox


 


 06/12/23 19:54   103 H  18  142/75  95


 


 06/12/23 16:00    18  


 


 06/12/23 13:42   76   


 


 06/12/23 13:32   76   


 


 06/12/23 12:42   72  18  123/58  98


 


 06/12/23 12:00    18  


 


 06/12/23 09:46   75   


 


 06/12/23 09:35   73   


 


 06/12/23 08:30    20  


 


 06/12/23 08:28  98.5 F  70  24  139/69  99








                                Intake and Output











 06/12/23 06/12/23 06/12/23





 06:59 14:59 22:59


 


Other:   


 


  Weight  102.058 kg 














Results


CBC & Chem 7: 


                                 06/12/23 09:17





                                 06/12/23 09:17


Labs: 


                  Abnormal Lab Results - Last 24 Hours (Table)











  06/12/23 06/12/23 06/12/23 Range/Units





  09:17 09:17 09:17 


 


RBC  3.30 L    (3.80-5.40)  m/uL


 


Hgb  10.6 L    (11.4-16.0)  gm/dL


 


Hct  32.1 L    (34.0-46.0)  %


 


Lymphocytes #  0.9 L    (1.0-4.8)  k/uL


 


APTT   21.5 L   (22.0-30.0)  sec


 


Sodium    133 L  (137-145)  mmol/L


 


Carbon Dioxide    16 L  (22-30)  mmol/L


 


BUN    42 H  (7-17)  mg/dL


 


Creatinine    5.47 H  (0.52-1.04)  mg/dL


 


Glucose    116 H  (74-99)  mg/dL


 


AST    38 H  (14-36)  U/L


 


Alkaline Phosphatase    136 H  ()  U/L

## 2023-06-12 NOTE — ED
SOB HPI





- General


Chief Complaint: Shortness of Breath


Stated Complaint: SOB


Time Seen by Provider: 06/12/23 08:50


Source: patient, RN notes reviewed


Mode of arrival: ambulatory


Limitations: no limitations





- History of Present Illness


Initial Comments: 


This is a 77-year-old female who presents to the emergency department for 

shortness of breath.  Patient states that this has been ongoing for the last 

couple of weeks.  Coughing is much worse when trying to lay flat.  States that 

she has also been having problems with ongoing drainage from her nose and mouth 

causing repetitive episodes of coughing.  Coughing is leading to chest pain.  

Her breathing treatments have not been very effective in managing this shortness

of breath. Additionally, patient does have ESRD and is on dialysis M, W, and F, 

and she missed her dialysis session today.





Denies any fevers, chills, sore throat, palpitations, abdominal pain, nausea, 

vomiting, diarrhea, back pain, or headaches.





MD Complaint: shortness of breath, cough





- Related Data


                                Home Medications











 Medication  Instructions  Recorded  Confirmed


 


Furosemide [Lasix] 40 mg PO DAILY 02/04/20 06/12/23


 


Levothyroxine Sodium [Synthroid] 112 mcg PO AC-BRKFST 02/04/20 06/12/23


 


Rosuvastatin Calcium [Crestor] 10 mg PO HS 02/04/20 06/12/23


 


Calcium Acetate 1,334 mg PO AC-TID 11/09/20 06/12/23


 


Metoprolol Tartrate [Lopressor] 25 mg PO BID 11/09/20 06/12/23


 


amLODIPine [Norvasc] 10 mg PO DAILY 06/21/21 06/12/23


 


Ergocalciferol [Vitamin D2 (1250 1,250 mcg PO MO 08/27/21 06/12/23





Mcg = 99421 Iu)]   


 


Nephro-Hyacinth Tab 1 tab PO HS 11/28/22 06/12/23


 


lisinopriL 40 mg PO DAILY 11/28/22 06/12/23


 


oxyCODONE-APAP 10-325MG [Percocet 1 tab PO TID PRN 01/07/23 06/12/23





 mg]   


 


Albuterol Inhaler [Ventolin Hfa 2 puff INHALATION RT-QID PRN 05/04/23 06/12/23





Inhaler]   


 


Albuterol Nebulized [Ventolin 2.5 mg INHALATION RT-QID PRN 05/04/23 06/12/23





Nebulized]   


 


Docusate [Colace] 100 mg PO Q4H PRN 05/04/23 06/12/23


 


Lidocaine-Prilocaine Cream [Emla 1 applic TOPICAL DAILY PRN 05/04/23 06/12/23





Cream 2.5%/2.5%]   


 


hydrALAZINE HCL [Apresoline] 25 mg PO HS 06/12/23 06/12/23











                                    Allergies











Allergy/AdvReac Type Severity Reaction Status Date / Time


 


naproxen [From Naprosyn] Allergy  Dyspnea Verified 06/12/23 11:33


 


blood thinner Allergy  "brain Uncoded 06/12/23 11:33





   bleed"  














Review of Systems


ROS Statement: 


Those systems with pertinent positive or pertinent negative responses have been 

documented in the HPI.





ROS Other: All systems not noted in ROS Statement are negative.





Past Medical History


Past Medical History: Asthma, Cancer, Diabetes Mellitus, Dialysis, Hyperlipidem

ia, Hypertension, Myocardial Infarction (MI), Osteoarthritis (OA), Pneumonia, 

Renal Disease, Thyroid Disorder


Additional Past Medical History / Comment(s): Hx Uterine cancer 25 yrs ago, 

hemodialysis Mon, Wed and Fri, varicose veins., "mild heart attack"" "brain 

bleed twice" chronic diarrhea, hx "14 polyps", "chronic back pain", anemia, 

currentl edema rufina feet, diet control diabetic


Last Myocardial Infarction Date:: unknown


History of Any Multi-Drug Resistant Organisms: None Reported


Past Surgical History: Appendectomy, Bariatric Surgery, Cholecystectomy, 

Hysterectomy, Joint Replacement, Tonsillectomy


Additional Past Surgical History / Comment(s): gastric bypass, surgery after 

fall -fx rt leg and foot, rufina knee replacement, rufina cataracts, surgery x 2 or 

"brain bleed", fistula for dialysis


Past Anesthesia/Blood Transfusion Reactions: Family History of Problems w/ 

Anesthesia


Additional Past Anesthesia/Blood Transfusion Reaction / Comment(s): MOTHER PONV


Past Psychological History: No Psychological Hx Reported


Smoking Status: Former smoker


Past Alcohol Use History: None Reported


Past Drug Use History: None Reported





- Past Family History


  ** Mother


Family Medical History: Cancer





  ** Father


Family Medical History: Diabetes Mellitus, Hypertension





  ** Sister(s)


Family Medical History: Cancer





General Exam


Limitations: no limitations


General appearance: alert, in no apparent distress


Head exam: Present: atraumatic, normocephalic, normal inspection


Respiratory exam: Present: wheezes, rales, accessory muscle use, decreased 

breath sounds, prolonged expiratory


Cardiovascular Exam: Present: regular rate, normal rhythm, normal heart sounds. 

Absent: systolic murmur, diastolic murmur, rubs, gallop, clicks


Neurological exam: Present: alert, oriented X3, CN II-XII intact


Psychiatric exam: Present: normal affect, normal mood


Skin exam: Present: warm, dry, intact, normal color.  Absent: rash





Course


                                   Vital Signs











  06/12/23 06/12/23 06/12/23





  08:28 08:30 09:35


 


Temperature 98.5 F  


 


Pulse Rate 70  73


 


Respiratory 24 20 





Rate   


 


Blood Pressure 139/69  


 


O2 Sat by Pulse 99  





Oximetry   














  06/12/23 06/12/23 06/12/23





  09:46 12:42 13:32


 


Temperature   


 


Pulse Rate 75 72 76


 


Respiratory  18 





Rate   


 


Blood Pressure  123/58 


 


O2 Sat by Pulse  98 





Oximetry   














  06/12/23





  13:42


 


Temperature 


 


Pulse Rate 76


 


Respiratory 





Rate 


 


Blood Pressure 


 


O2 Sat by Pulse 





Oximetry 














Medical Decision Making





- Medical Decision Making


This is a 77-year-old female who presents to the emergency department for 

shortness of breath.





Was pt. sent in by a medical professional or institution?


@  -No   


Did you speak to anyone other than the patient for history?  


@  -No


Did you review nursing and triage notes? 


@  -Yes, and I agree, it is accurate with regards to the patient's symptoms.


Were old charts reviewed? 


@  -No


Differential Diagnosis? 


@  -Differential Dyspnea:


Coronary syndrome, arrhythmia, tamponade, asthma, COPD, pulmonary embolism, 

pneumonia, pneumothorax, pulmonary effusion, anaphylaxis, diabetic ketoacidosis,

flailed chest, pulmonary contusion, diaphragmatic rupture, anemia, 

neuromuscular, this is not meant to be an all-inclusive list. 


EKG interpreted by me (3pts min.)?


@  -EKG interpreted by me demonstrating the following: Sinus rhythm.  

Ventricular rate 72 beats per minute, NC interval 169 ms, QRS duration 93 ms, 

 ms.


X-rays interpreted by me (1pt min.)?


@  -Chest x-ray obtained, my interpretation identifies no localized 

consolidations or infiltrates.


CT interpreted by me (1pt min.)?


@  -Not obtained


U/S interpreted by me (1pt. min.)?


@  -Not obtained


What testing was considered but not performed? (CT, X-rays, U/S, labs)? Why?


@  -None


What meds were considered but not given? Why?


@  -None


Did you discuss the management of the patient with other professionals?


@  -Yes, Dr. Valle, who accepts the patient for admission.


Did you reconcile home meds?


@  -Yes


Was smoking cessation discussed for >3mins.?


@  -No


Was critical care preformed (if so, how long)?


@  -No


Were there social determinants of health that impacted care today? How? 

(Homelessness, low income, unemployed, alcoholism, drug addiction, 

transportation, low edu. Level, literacy, decrease access to med. care, shelter, 

rehab)?


@  -No


Was there de-escalation of care discussed even if they declined? (Discuss DNR or

withdrawal of care, Hospice)?


@  -No


What co-morbidities impacted this encounter? (DM, HTN, Smoking, COPD, CAD, 

Cancer, CVA, Hep., AIDS, mental health diagnosis, sleep apnea, morbid obesity)?


@  -CHF, COPD, DM, HLD, HTN, ESRD


Was patient admitted / discharged?


@  -Admitted. Lab work obtained revealing poor renal function.  While this is 

worse than last month, it is consistent with the patient's end-stage renal 

disease.  BNP is 3400.  However, this is improved from last month when it was 

7950.  Chest x-ray reveals no acute process.  Patient was given 2 DuoNeb 

breathing treatments in the emergency department with no improvement in 

symptoms.  She also has very poor lung sounds on auscultation with accessory 

muscle use. Patient is scared of going home due to her symptoms. She also missed

her dialysis appointment today.  Patient admitted to medicine for COPD 

exacerbation.  Nephrology consulted for end-stage renal disease and hemodialysis

management.


Undiagnosed new problem with uncertain prognosis?


@  -None


Drug Therapy requiring intensive monitoring for toxicity (Heparin, Nitro, Ins

ulin, Cardizem)?


@  -None


Were any procedures done?


@  -None


Diagnosis/symptom?


@  -COPD exacerbation


Acute, or Chronic, or Acute on Chronic?


@  -Acute on chronic


Uncomplicated (without systemic symptoms) or Complicated (systemic symptoms)?


@  -Complicated


Side effects of treatment?


@  -None


Exacerbation, Progression, or Severe Exacerbation]


@  -Severe exacerbation


Poses a threat to life or bodily function?


@  -Yes





This case was discussed in detail with the attending ED physician, Dr. Velasquez 

Presentation, findings, and treatment plan discussed in detail as well. 








- Lab Data


Result diagrams: 


                                 06/12/23 09:17





                                 06/12/23 09:17


                                   Lab Results











  06/12/23 06/12/23 06/12/23 Range/Units





  09:17 09:17 09:17 


 


WBC  5.7    (3.8-10.6)  k/uL


 


RBC  3.30 L    (3.80-5.40)  m/uL


 


Hgb  10.6 L    (11.4-16.0)  gm/dL


 


Hct  32.1 L    (34.0-46.0)  %


 


MCV  97.3    (80.0-100.0)  fL


 


MCH  32.1    (25.0-35.0)  pg


 


MCHC  33.0    (31.0-37.0)  g/dL


 


RDW  13.5    (11.5-15.5)  %


 


Plt Count  186    (150-450)  k/uL


 


MPV  8.3    


 


Neutrophils %  72    %


 


Lymphocytes %  15    %


 


Monocytes %  6    %


 


Eosinophils %  4    %


 


Basophils %  0    %


 


Neutrophils #  4.1    (1.3-7.7)  k/uL


 


Lymphocytes #  0.9 L    (1.0-4.8)  k/uL


 


Monocytes #  0.4    (0-1.0)  k/uL


 


Eosinophils #  0.2    (0-0.7)  k/uL


 


Basophils #  0.0    (0-0.2)  k/uL


 


PT   9.9   (9.0-12.0)  sec


 


INR   0.9   (<1.2)  


 


APTT   21.5 L   (22.0-30.0)  sec


 


Sodium    133 L  (137-145)  mmol/L


 


Potassium    4.3  (3.5-5.1)  mmol/L


 


Chloride    105  ()  mmol/L


 


Carbon Dioxide    16 L  (22-30)  mmol/L


 


Anion Gap    12  mmol/L


 


BUN    42 H  (7-17)  mg/dL


 


Creatinine    5.47 H  (0.52-1.04)  mg/dL


 


Est GFR (CKD-EPI)AfAm    8  (>60 ml/min/1.73 sqM)  


 


Est GFR (CKD-EPI)NonAf    7  (>60 ml/min/1.73 sqM)  


 


Glucose    116 H  (74-99)  mg/dL


 


Plasma Lactic Acid Tomy     (0.7-2.0)  mmol/L


 


Calcium    8.6  (8.4-10.2)  mg/dL


 


Total Bilirubin    0.7  (0.2-1.3)  mg/dL


 


AST    38 H  (14-36)  U/L


 


ALT    22  (4-34)  U/L


 


Alkaline Phosphatase    136 H  ()  U/L


 


Troponin I     (0.000-0.034)  ng/mL


 


NT-Pro-B Natriuret Pep     pg/mL


 


Total Protein    6.5  (6.3-8.2)  g/dL


 


Albumin    3.5  (3.5-5.0)  g/dL


 


Influenza Type A (PCR)     (Not Detectd)  


 


Influenza Type B (PCR)     (Not Detectd)  


 


RSV (PCR)     (Not Detectd)  


 


SARS-CoV-2 (PCR)     (Not Detectd)  














  06/12/23 06/12/23 06/12/23 Range/Units





  09:17 09:17 09:17 


 


WBC     (3.8-10.6)  k/uL


 


RBC     (3.80-5.40)  m/uL


 


Hgb     (11.4-16.0)  gm/dL


 


Hct     (34.0-46.0)  %


 


MCV     (80.0-100.0)  fL


 


MCH     (25.0-35.0)  pg


 


MCHC     (31.0-37.0)  g/dL


 


RDW     (11.5-15.5)  %


 


Plt Count     (150-450)  k/uL


 


MPV     


 


Neutrophils %     %


 


Lymphocytes %     %


 


Monocytes %     %


 


Eosinophils %     %


 


Basophils %     %


 


Neutrophils #     (1.3-7.7)  k/uL


 


Lymphocytes #     (1.0-4.8)  k/uL


 


Monocytes #     (0-1.0)  k/uL


 


Eosinophils #     (0-0.7)  k/uL


 


Basophils #     (0-0.2)  k/uL


 


PT     (9.0-12.0)  sec


 


INR     (<1.2)  


 


APTT     (22.0-30.0)  sec


 


Sodium     (137-145)  mmol/L


 


Potassium     (3.5-5.1)  mmol/L


 


Chloride     ()  mmol/L


 


Carbon Dioxide     (22-30)  mmol/L


 


Anion Gap     mmol/L


 


BUN     (7-17)  mg/dL


 


Creatinine     (0.52-1.04)  mg/dL


 


Est GFR (CKD-EPI)AfAm     (>60 ml/min/1.73 sqM)  


 


Est GFR (CKD-EPI)NonAf     (>60 ml/min/1.73 sqM)  


 


Glucose     (74-99)  mg/dL


 


Plasma Lactic Acid Tomy  1.5    (0.7-2.0)  mmol/L


 


Calcium     (8.4-10.2)  mg/dL


 


Total Bilirubin     (0.2-1.3)  mg/dL


 


AST     (14-36)  U/L


 


ALT     (4-34)  U/L


 


Alkaline Phosphatase     ()  U/L


 


Troponin I   <0.012   (0.000-0.034)  ng/mL


 


NT-Pro-B Natriuret Pep    3400  pg/mL


 


Total Protein     (6.3-8.2)  g/dL


 


Albumin     (3.5-5.0)  g/dL


 


Influenza Type A (PCR)     (Not Detectd)  


 


Influenza Type B (PCR)     (Not Detectd)  


 


RSV (PCR)     (Not Detectd)  


 


SARS-CoV-2 (PCR)     (Not Detectd)  














  06/12/23 Range/Units





  09:17 


 


WBC   (3.8-10.6)  k/uL


 


RBC   (3.80-5.40)  m/uL


 


Hgb   (11.4-16.0)  gm/dL


 


Hct   (34.0-46.0)  %


 


MCV   (80.0-100.0)  fL


 


MCH   (25.0-35.0)  pg


 


MCHC   (31.0-37.0)  g/dL


 


RDW   (11.5-15.5)  %


 


Plt Count   (150-450)  k/uL


 


MPV   


 


Neutrophils %   %


 


Lymphocytes %   %


 


Monocytes %   %


 


Eosinophils %   %


 


Basophils %   %


 


Neutrophils #   (1.3-7.7)  k/uL


 


Lymphocytes #   (1.0-4.8)  k/uL


 


Monocytes #   (0-1.0)  k/uL


 


Eosinophils #   (0-0.7)  k/uL


 


Basophils #   (0-0.2)  k/uL


 


PT   (9.0-12.0)  sec


 


INR   (<1.2)  


 


APTT   (22.0-30.0)  sec


 


Sodium   (137-145)  mmol/L


 


Potassium   (3.5-5.1)  mmol/L


 


Chloride   ()  mmol/L


 


Carbon Dioxide   (22-30)  mmol/L


 


Anion Gap   mmol/L


 


BUN   (7-17)  mg/dL


 


Creatinine   (0.52-1.04)  mg/dL


 


Est GFR (CKD-EPI)AfAm   (>60 ml/min/1.73 sqM)  


 


Est GFR (CKD-EPI)NonAf   (>60 ml/min/1.73 sqM)  


 


Glucose   (74-99)  mg/dL


 


Plasma Lactic Acid Tomy   (0.7-2.0)  mmol/L


 


Calcium   (8.4-10.2)  mg/dL


 


Total Bilirubin   (0.2-1.3)  mg/dL


 


AST   (14-36)  U/L


 


ALT   (4-34)  U/L


 


Alkaline Phosphatase   ()  U/L


 


Troponin I   (0.000-0.034)  ng/mL


 


NT-Pro-B Natriuret Pep   pg/mL


 


Total Protein   (6.3-8.2)  g/dL


 


Albumin   (3.5-5.0)  g/dL


 


Influenza Type A (PCR)  Not Detected  (Not Detectd)  


 


Influenza Type B (PCR)  Not Detected  (Not Detectd)  


 


RSV (PCR)  Not Detected  (Not Detectd)  


 


SARS-CoV-2 (PCR)  Not Detected  (Not Detectd)  














- Radiology Data


Radiology results: report reviewed, image reviewed





Disposition


Clinical Impression: 


 COPD exacerbation





Disposition: ADMITTED AS IP TO THIS HOSP

## 2023-06-12 NOTE — XR
EXAMINATION TYPE: XR chest 2V

 

DATE OF EXAM: 6/12/2023

 

COMPARISON: 5/4/23

 

HISTORY: Shortness of breath

 

TECHNIQUE:  Frontal and lateral views of the chest are obtained.

 

FINDINGS:

 

Scattered senescent parenchymal changes noted. Hyperinflation compatible with COPD. 

 

No evidence for infiltrate. No evidence for atelectasis.

 

Heart size is stable.

 

Mediastinal structures are stable and grossly unremarkable.

 

No evidence for hilar prominence.

 

Degenerative changes dorsal spine. 

 

IMPRESSION:

1. No evidence for acute pulmonary disease.

## 2023-06-13 LAB
GLUCOSE BLD-MCNC: 296 MG/DL (ref 70–110)
HBV SURFACE AB SERPL IA-ACNC: 3.5 MIU/ML

## 2023-06-13 RX ADMIN — LORATADINE, PSEUDOEPHEDRINE SULFATE SCH EACH: 5; 120 TABLET, FILM COATED, EXTENDED RELEASE ORAL at 20:53

## 2023-06-13 RX ADMIN — METHYLPREDNISOLONE SODIUM SUCCINATE SCH MG: 40 INJECTION, POWDER, FOR SOLUTION INTRAMUSCULAR; INTRAVENOUS at 23:52

## 2023-06-13 RX ADMIN — METOPROLOL TARTRATE SCH MG: 25 TABLET, FILM COATED ORAL at 08:16

## 2023-06-13 RX ADMIN — FORMOTEROL FUMARATE DIHYDRATE SCH MCG: 20 SOLUTION RESPIRATORY (INHALATION) at 07:50

## 2023-06-13 RX ADMIN — ENOXAPARIN SODIUM SCH MG: 30 INJECTION SUBCUTANEOUS at 20:53

## 2023-06-13 RX ADMIN — IPRATROPIUM BROMIDE AND ALBUTEROL SULFATE SCH ML: .5; 3 SOLUTION RESPIRATORY (INHALATION) at 20:22

## 2023-06-13 RX ADMIN — INSULIN DETEMIR SCH UNIT: 100 INJECTION, SOLUTION SUBCUTANEOUS at 20:52

## 2023-06-13 RX ADMIN — Medication SCH EACH: at 20:53

## 2023-06-13 RX ADMIN — LORATADINE, PSEUDOEPHEDRINE SULFATE SCH: 5; 120 TABLET, FILM COATED, EXTENDED RELEASE ORAL at 11:12

## 2023-06-13 RX ADMIN — INSULIN ASPART SCH UNIT: 100 INJECTION, SOLUTION INTRAVENOUS; SUBCUTANEOUS at 20:52

## 2023-06-13 RX ADMIN — OXYCODONE HYDROCHLORIDE AND ACETAMINOPHEN PRN EACH: 10; 325 TABLET ORAL at 08:16

## 2023-06-13 RX ADMIN — DOXYCYCLINE SCH MG: 100 CAPSULE ORAL at 20:53

## 2023-06-13 RX ADMIN — LEVOTHYROXINE SODIUM SCH MCG: 0.11 TABLET ORAL at 06:40

## 2023-06-13 RX ADMIN — BUDESONIDE SCH MG: 1 SUSPENSION RESPIRATORY (INHALATION) at 07:50

## 2023-06-13 RX ADMIN — Medication SCH MG: at 06:40

## 2023-06-13 RX ADMIN — Medication SCH MG: at 12:18

## 2023-06-13 RX ADMIN — METOPROLOL TARTRATE SCH MG: 25 TABLET, FILM COATED ORAL at 20:52

## 2023-06-13 RX ADMIN — METHYLPREDNISOLONE SODIUM SUCCINATE SCH MG: 40 INJECTION, POWDER, FOR SOLUTION INTRAMUSCULAR; INTRAVENOUS at 08:17

## 2023-06-13 RX ADMIN — ATORVASTATIN CALCIUM SCH MG: 20 TABLET, FILM COATED ORAL at 20:52

## 2023-06-13 RX ADMIN — FUROSEMIDE SCH MG: 40 TABLET ORAL at 08:17

## 2023-06-13 RX ADMIN — FORMOTEROL FUMARATE DIHYDRATE SCH MCG: 20 SOLUTION RESPIRATORY (INHALATION) at 20:22

## 2023-06-13 RX ADMIN — OXYCODONE HYDROCHLORIDE AND ACETAMINOPHEN PRN EACH: 10; 325 TABLET ORAL at 18:02

## 2023-06-13 RX ADMIN — Medication SCH: at 18:44

## 2023-06-13 RX ADMIN — BUDESONIDE SCH MG: 1 SUSPENSION RESPIRATORY (INHALATION) at 20:22

## 2023-06-13 RX ADMIN — IPRATROPIUM BROMIDE AND ALBUTEROL SULFATE SCH ML: .5; 3 SOLUTION RESPIRATORY (INHALATION) at 07:50

## 2023-06-13 RX ADMIN — METHYLPREDNISOLONE SODIUM SUCCINATE SCH MG: 40 INJECTION, POWDER, FOR SOLUTION INTRAMUSCULAR; INTRAVENOUS at 18:48

## 2023-06-13 RX ADMIN — IPRATROPIUM BROMIDE AND ALBUTEROL SULFATE SCH: .5; 3 SOLUTION RESPIRATORY (INHALATION) at 16:20

## 2023-06-13 RX ADMIN — DOXYCYCLINE SCH MG: 100 CAPSULE ORAL at 08:16

## 2023-06-13 RX ADMIN — IPRATROPIUM BROMIDE AND ALBUTEROL SULFATE SCH ML: .5; 3 SOLUTION RESPIRATORY (INHALATION) at 11:58

## 2023-06-13 NOTE — P.PN
Progress Note - Text


Progress Note Date: 06/13/23





Chief Complaint: Short of breath





History of presenting complaint:


This is a pleasant 77 a patient of Dr. Ashraf.  Chronic stable medical 

conditions include hypertension, hyperlipidemia, depression, anxiety, 

osteoarthritis.  End-stage kidney disease on hemodialysis.


Patient presents to the ER with worsening short of breath.  Cough.  Some nasal 

drainage.  Relevant to current of which is not known.  No obvious fever and 

chills.  Appetite is fair.  Some edema.  Did not get hemodialysis today.  No 

chest pain.


Admitted with acute COPD exacerbation, acute bronchitis, acute fluid overload.  

Bronchodilators.  Doxycycline.  Hemodialysis.


June 13: Breathing a bit better.  Dose also wheezing.  Getting bronchodilators. 

Doxycycline.  Eating better.  Does not like hospital food.








Past medical history to include:


Hypertension, hyperlipidemia, depression, anxiety, osteoarthritis, end-stage 

kidney disease on hemodialysis, uterine cancer, bariatric surgery, depression, 

osteoarthritis





Social history:


Quit smoking over 30 years ago.  Lives alone





Physical examination:


VITAL SIGNS: 97.6, 83, 16, 103/54, 94% room air


GENERAL: BMI 42.5, declining, breathing better


EYES: Pupils equal.  Conjunctiva normal.


HEENT: External appearance of nose and ears normal, oral cavity grossly normal.


NECK: JVD not raised; masses not palpable.


HEART: First and second heart sounds are normal; some edema.  


LUNGS: Respiratory rate increased; wheezing and some crackles.  


ABDOMEN: Soft,  nontender, liver spleen not palpable, no masses palpable.  


PSYCH: Alert and oriented x3;  mood  and affect anxiousl.  


MUSCULOSKELETAL:No Clubbing/cyanosis;muscles-grossly intact.  OA














INVESTIGATIONS, reviewed in the clinical context:


White count 5.7 hemoglobin 10.6 platelets 186 sodium 133 potassium 4.3 BUN 42 

creatinine 5.47


Influenza type A, B, RSV, COVID-19: Not detected


EKG tracing personally reviewed by me-normal sinus rhythm.  Anterior leads poor 

R-wave progression


Chest x-ray film personally reviewed by me-right diaphragm elevated.  Some 

venous prominence





Assessment and plan:





-Acute COPD exacerbation in a prior smoker: Slow to respond


DuoNeb 4 times a day, nebulized Perforomist, Pulmicort.  IV l.42(28





-Postnasal drainage.


Claritin-D





-Acute bronchitis


Doxycycline





-Acute fluid overload from incomplete hemodialysis/pulmonary edema


Hemodialysis.





-Anemia of chronic kidney disease








-End-stage kidney disease on hemodialysis.  From diabetic nephropathy


Follow with nephrology








-Hyperlipidemia Crestor 10 mg daily at bedtime





-Essential hypertension, 


Lisinopril 40 mg daily, Norvasc 10 mg daily, Lopressor 25 mg twice a day








-Primary osteoarthritis


Tylenol as needed





-Morbid obesity BMI 2.5


Weight loss measures








-DO NOT RESUSCITATE





I do current medications.  Doxycycline.  Follow with nephrology.

## 2023-06-14 LAB
GLUCOSE BLD-MCNC: 146 MG/DL (ref 70–110)
GLUCOSE BLD-MCNC: 241 MG/DL (ref 70–110)
GLUCOSE BLD-MCNC: 248 MG/DL (ref 70–110)
GLUCOSE BLD-MCNC: 269 MG/DL (ref 70–110)
GLUCOSE BLD-MCNC: 300 MG/DL (ref 70–110)

## 2023-06-14 RX ADMIN — DOXYCYCLINE SCH MG: 100 CAPSULE ORAL at 07:50

## 2023-06-14 RX ADMIN — ATORVASTATIN CALCIUM SCH MG: 20 TABLET, FILM COATED ORAL at 20:36

## 2023-06-14 RX ADMIN — FORMOTEROL FUMARATE DIHYDRATE SCH MCG: 20 SOLUTION RESPIRATORY (INHALATION) at 20:41

## 2023-06-14 RX ADMIN — IPRATROPIUM BROMIDE AND ALBUTEROL SULFATE SCH ML: .5; 3 SOLUTION RESPIRATORY (INHALATION) at 20:41

## 2023-06-14 RX ADMIN — METOPROLOL TARTRATE SCH MG: 25 TABLET, FILM COATED ORAL at 12:00

## 2023-06-14 RX ADMIN — INSULIN DETEMIR SCH UNIT: 100 INJECTION, SOLUTION SUBCUTANEOUS at 20:36

## 2023-06-14 RX ADMIN — Medication SCH MG: at 18:34

## 2023-06-14 RX ADMIN — FUROSEMIDE SCH MG: 40 TABLET ORAL at 12:00

## 2023-06-14 RX ADMIN — METHYLPREDNISOLONE SODIUM SUCCINATE SCH MG: 40 INJECTION, POWDER, FOR SOLUTION INTRAMUSCULAR; INTRAVENOUS at 07:48

## 2023-06-14 RX ADMIN — IPRATROPIUM BROMIDE AND ALBUTEROL SULFATE SCH ML: .5; 3 SOLUTION RESPIRATORY (INHALATION) at 15:41

## 2023-06-14 RX ADMIN — LORATADINE, PSEUDOEPHEDRINE SULFATE SCH EACH: 5; 120 TABLET, FILM COATED, EXTENDED RELEASE ORAL at 20:36

## 2023-06-14 RX ADMIN — OXYCODONE HYDROCHLORIDE AND ACETAMINOPHEN PRN EACH: 10; 325 TABLET ORAL at 11:57

## 2023-06-14 RX ADMIN — Medication SCH EACH: at 20:36

## 2023-06-14 RX ADMIN — IPRATROPIUM BROMIDE AND ALBUTEROL SULFATE SCH: .5; 3 SOLUTION RESPIRATORY (INHALATION) at 11:43

## 2023-06-14 RX ADMIN — INSULIN ASPART SCH UNIT: 100 INJECTION, SOLUTION INTRAVENOUS; SUBCUTANEOUS at 06:31

## 2023-06-14 RX ADMIN — FORMOTEROL FUMARATE DIHYDRATE SCH MCG: 20 SOLUTION RESPIRATORY (INHALATION) at 08:53

## 2023-06-14 RX ADMIN — OXYCODONE HYDROCHLORIDE AND ACETAMINOPHEN PRN EACH: 10; 325 TABLET ORAL at 03:50

## 2023-06-14 RX ADMIN — METHYLPREDNISOLONE SODIUM SUCCINATE SCH MG: 40 INJECTION, POWDER, FOR SOLUTION INTRAMUSCULAR; INTRAVENOUS at 16:55

## 2023-06-14 RX ADMIN — BUDESONIDE SCH MG: 1 SUSPENSION RESPIRATORY (INHALATION) at 08:53

## 2023-06-14 RX ADMIN — Medication SCH MG: at 12:01

## 2023-06-14 RX ADMIN — LEVOTHYROXINE SODIUM SCH MCG: 0.11 TABLET ORAL at 06:31

## 2023-06-14 RX ADMIN — OXYCODONE HYDROCHLORIDE AND ACETAMINOPHEN PRN EACH: 10; 325 TABLET ORAL at 20:41

## 2023-06-14 RX ADMIN — Medication SCH MG: at 06:31

## 2023-06-14 RX ADMIN — LORATADINE, PSEUDOEPHEDRINE SULFATE SCH EACH: 5; 120 TABLET, FILM COATED, EXTENDED RELEASE ORAL at 07:50

## 2023-06-14 RX ADMIN — FORMOTEROL FUMARATE DIHYDRATE SCH: 20 SOLUTION RESPIRATORY (INHALATION) at 09:10

## 2023-06-14 RX ADMIN — METOPROLOL TARTRATE SCH MG: 25 TABLET, FILM COATED ORAL at 20:36

## 2023-06-14 RX ADMIN — INSULIN ASPART SCH: 100 INJECTION, SOLUTION INTRAVENOUS; SUBCUTANEOUS at 11:52

## 2023-06-14 RX ADMIN — DOXYCYCLINE SCH MG: 100 CAPSULE ORAL at 20:36

## 2023-06-14 RX ADMIN — INSULIN ASPART SCH UNIT: 100 INJECTION, SOLUTION INTRAVENOUS; SUBCUTANEOUS at 20:36

## 2023-06-14 RX ADMIN — IPRATROPIUM BROMIDE AND ALBUTEROL SULFATE SCH ML: .5; 3 SOLUTION RESPIRATORY (INHALATION) at 08:53

## 2023-06-14 RX ADMIN — BUDESONIDE SCH MG: 1 SUSPENSION RESPIRATORY (INHALATION) at 20:42

## 2023-06-14 RX ADMIN — INSULIN ASPART SCH UNIT: 100 INJECTION, SOLUTION INTRAVENOUS; SUBCUTANEOUS at 18:05

## 2023-06-14 NOTE — P.PN
Subjective





She was seen in follow-up for end-stage renal disease.  She is maintained on 

hemodialysis on Monday Wednesday Friday schedule.  Tolerating dialysis well.  

Blood pressure stable.  Complaining of productive cough.





Vital signs are stable.


General: No acute distress.


HEENT: Head exam is unremarkable. 


LUNGS: No audible rhonchi or wheezes.


HEART: Rate and Rhythm are regular.


ABDOMEN: Nontender.  Obese.


EXTREMITITES: No edema.





Objective





- Vital Signs


Vital signs: 


                                   Vital Signs











Temp  97.6 F   06/14/23 07:00


 


Pulse  80   06/14/23 09:07


 


Resp  16   06/14/23 07:00


 


BP  110/61   06/14/23 07:00


 


Pulse Ox  97   06/14/23 07:00


 


FiO2      








                                 Intake & Output











 06/13/23 06/14/23 06/14/23





 18:59 06:59 18:59


 


Intake Total 600  


 


Output Total 2500  


 


Balance -1900  


 


Weight 102.058 kg  


 


Intake:   


 


  Hemodialysis 600  


 


Output:   


 


  Hemodialysis 2500  


 


Other:   


 


  Voiding Method  Toilet 


 


  # Voids 0 2 














- Labs


CBC & Chem 7: 


                                 06/12/23 09:17





                                 06/12/23 09:17


Labs: 


                  Abnormal Lab Results - Last 24 Hours (Table)











  06/13/23 06/14/23 06/14/23 Range/Units





  19:44 03:54 06:05 


 


POC Glucose (mg/dL)  296 H  248 H  269 H  ()  mg/dL














Assessment and Plan


Plan: 





Assessment:


1.  End-stage renal disease maintained on hemodialysis on Monday Wednesday Friday schedule.


2.  Shortness of breath possibly from URI.  ?COPD exac. On antibiotics and IV 

steroids. 


3.  Hypertension with chronic kidney disease.  Stable.


4.  History of hemorrhagic CVA.


5.  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 


6.  Metabolic acidosis due to CKD. Expect improvement post HD.





Plan:


Currently seen while undergoing hemodialysis.  Next treatment on Friday.

## 2023-06-14 NOTE — P.PN
Progress Note - Text


Progress Note Date: 06/14/23





Chief Complaint: Short of breath





History of presenting complaint:


This is a pleasant 77 a patient of Dr. Ashraf.  Chronic stable medical 

conditions include hypertension, hyperlipidemia, depression, anxiety, 

osteoarthritis.  End-stage kidney disease on hemodialysis.


Patient presents to the ER with worsening short of breath.  Cough.  Some nasal 

drainage.  Relevant to current of which is not known.  No obvious fever and 

chills.  Appetite is fair.  Some edema.  Did not get hemodialysis today.  No 

chest pain.


Admitted with acute COPD exacerbation, acute bronchitis, acute fluid overload.  

Bronchodilators.  Doxycycline.  Hemodialysis.


June 13: Breathing a bit better.  Dose also wheezing.  Getting bronchodilators. 

Doxycycline.  Eating better.  Does not like hospital food.


June 14: Breathing better.  hemodialyzed today.  Had a bout of coughing and 

wheezing earlier.  Better now.  Requesting 1 more day stay in hospital.  No 

sputum.





Active Medications





Acetaminophen (Acetaminophen Tab 325 Mg Tab)  650 mg PO Q6HR PRN


   PRN Reason: Mild Pain or Fever > 100.5


Albuterol/Ipratropium (Ipratropium-Albuterol 3 Ml Neb)  3 ml INHALATION RT-QID 

[facility]


   Last Admin: 06/14/23 11:43 Dose:  Not Given


   


Amlodipine Besylate (Amlodipine 10 Mg Tab)  10 mg PO DAILY [facility]


   Last Admin: 06/14/23 12:00 Dose:  10 mg


   


Atorvastatin Calcium (Atorvastatin 20 Mg Tab)  20 mg PO HS [facility]


   Last Admin: 06/13/23 20:52 Dose:  20 mg


   


Budesonide (Budesonide 1 Mg/2 Ml Nebu)  1 mg INHALATION RT-BID [facility]


   Last Admin: 06/14/23 08:53 Dose:  1 mg


   


Calcium Acetate (Calcium Acetate 667 Mg Tab)  1,334 mg PO AC-TID [facility]


   Last Admin: 06/14/23 12:01 Dose:  1,334 mg


   


Calcium Carbonate/Glycine (Calcium Carbonate 500 Mg Chewable)  1,000 mg PO Q4HR 

PRN


   PRN Reason: Dyspepsia


Dextrose/Water (Dextrose 50% Syringe 50 Ml)  25 ml IVP PER PROTOCOL PRN; 

Protocol


   PRN Reason: Hypoglycemia


Dextrose/Water (Dextrose 50% Syringe 50 Ml)  50 ml IVP PER PROTOCOL PRN; 

Protocol


   PRN Reason: Hypoglycemia


Docusate Sodium (Docusate 100 Mg Cap)  100 mg PO Q4H PRN


   PRN Reason: Constipation


Doxycycline Monohydrate (Doxycycline 100 Mg Cap)  100 mg PO BID [facility]; Protocol


   Last Admin: 06/14/23 07:50 Dose:  100 mg


   


Enoxaparin Sodium (Enoxaparin 30 Mg/0.3 Ml Syringe)  30 mg SQ HS [facility]


   Last Admin: 06/13/23 20:53 Dose:  30 mg


   


Ergocalciferol (Ergocalciferol 1,250 Mcg (50,000 Iu) Capsule)  1,250 mcg PO MO 

[facility]


Formoterol Fumarate (Formoterol Fumarate 20 Mcg/2 Ml Nebu)  20 mcg INHALATION 

RT-BID [facility]


   Last Admin: 06/14/23 09:10 Dose:  Not Given


   


Furosemide (Furosemide 40 Mg Tab)  40 mg PO DAILY [facility]


   Last Admin: 06/14/23 12:00 Dose:  40 mg


   


Guaifenesin (Guaifenesin 600 Mg Tablet.Er)  600 mg PO QID [facility]


   Last Admin: 06/14/23 12:01 Dose:  600 mg


   


Insulin Aspart (Insulin Aspart (Novolog) 100 Unit/Ml Vial)  0 unit SQ [facility]; 

Protocol


   Last Admin: 06/14/23 11:52 Dose:  Not Given


   


Insulin Detemir (Insulin Detemir (Levemir) 100 Unit/Ml Syr)  12 unit SQ [facility]


   Last Admin: 06/13/23 20:52 Dose:  12 unit


   


Lactulose (Lactulose 20 Gm/30 Ml Cup)  20 gm PO DAILY PRN


   PRN Reason: Constipation


Levothyroxine Sodium (Levothyroxine 112 Mcg Tab)  112 mcg PO AC-BRKFST [facility]


   Last Admin: 06/14/23 06:31 Dose:  112 mcg


   


Lisinopril (Lisinopril 20 Mg Tab)  40 mg PO DAILY [facility]


   Last Admin: 06/14/23 12:00 Dose:  40 mg


   


Loratadine/Pseudoephedrine Sulfate (Loratadine-Pseudoeph 5-120 Mg 1 Each 

Tab.Er.12h)  1 each PO Q12HR [facility]


   Last Admin: 06/14/23 07:50 Dose:  1 each


   


Lorazepam (Lorazepam 0.5 Mg Tab)  0.5 mg PO Q6HR PRN


   PRN Reason: Anxiety


   Last Admin: 06/13/23 02:47 Dose:  0.5 mg


   


Melatonin (Melatonin 3 Mg Tablet)  3 mg PO HS PRN


   PRN Reason: Insomnia


   Last Admin: 06/13/23 02:47 Dose:  3 mg


   


Methylprednisolone Sodium Succinate (Methylprednisolone Sod Succi 40 Mg/Ml 1 Ml 

Vial)  40 mg IV Q8HR [facility]


   Last Admin: 06/14/23 07:48 Dose:  40 mg


   


Metoprolol Tartrate (Metoprolol Tartrate 25 Mg Tab)  25 mg PO BID [facility]


   Last Admin: 06/14/23 12:00 Dose:  25 mg


   


Multivit/Ca Carb/B Cmplx/FA/Prenat (Folic Acid-Vit B Complex-Vit C 1 Cap)  1 

each PO HS [facility]


   Last Admin: 06/13/23 20:53 Dose:  1 each


   


Naloxone HCl (Naloxone 0.4 Mg/Ml 1 Ml Vial)  0.2 mg IV Q2M PRN


   PRN Reason: Opioid Reversal


Ondansetron HCl (Ondansetron 4 Mg/2 Ml Vial)  4 mg IVP Q8HR PRN


   PRN Reason: Nausea And Vomiting


   Last Admin: 06/13/23 05:34 Dose:  4 mg


   


Oxycodone/Acetaminophen (Oxycodone-Apap 10-325mg 1 Each Tab)  1 each PO TID PRN


   PRN Reason: Pain


   Last Admin: 06/14/23 11:57 Dose:  1 each


   














Past medical history to include:


Hypertension, hyperlipidemia, depression, anxiety, osteoarthritis, end-stage 

kidney disease on hemodialysis, uterine cancer, bariatric surgery, depression, 

osteoarthritis





Social history:


Quit smoking over 30 years ago.  Lives alone





Physical examination:


VITAL SIGNS: 97.2, 82, 16, 127 Lasix 9, 97% room air


GENERAL: BMI 42.5, declining, but in distress


EYES: Pupils equal.  Conjunctiva normal.


HEENT: External appearance of nose and ears normal, oral cavity grossly normal.


NECK: JVD not raised; masses not palpable.


HEART: First and second heart sounds are normal; some edema.  


LUNGS: Respiratory rate increased; creased wheezing


ABDOMEN: Soft,  nontender, liver spleen not palpable, no masses palpable.  


PSYCH: Alert and oriented x3;  mood  and affect anxiousl.  


MUSCULOSKELETAL:No Clubbing/cyanosis;muscles-grossly intact.  OA














INVESTIGATIONS, reviewed in the clinical context:


White count 5.7 hemoglobin 10.6 platelets 186 sodium 133 potassium 4.3 BUN 42 

creatinine 5.47


Influenza type A, B, RSV, COVID-19: Not detected


EKG tracing personally reviewed by me-normal sinus rhythm.  Anterior leads poor 

R-wave progression


Chest x-ray film personally reviewed by me-right diaphragm elevated.  Some 

venous prominence





Assessment and plan:





-Acute COPD exacerbation in a prior smoker: Improving


DuoNeb 4 times a day, nebulized Perforomist, Pulmicort.  IV Solu-Medrol





-Postnasal drainage.


Claritin-D





-Acute bronchitis


Doxycycline





-Acute fluid overload from incomplete hemodialysis/pulmonary edema


Hemodialysis.





-Anemia of chronic kidney disease








-End-stage kidney disease on hemodialysis.  From diabetic nephropathy


Follow with nephrology








-Hyperlipidemia Crestor 10 mg daily at bedtime





-Essential hypertension, 


Lisinopril 40 mg daily, Norvasc 10 mg daily, Lopressor 25 mg twice a day








-Primary osteoarthritis


Tylenol as needed





-Morbid obesity BMI 2.5


Weight loss measures








-DO NOT RESUSCITATE





Continue current medications.  Discussed with patient.  Hopefully discharge 

tomorrow.

## 2023-06-15 VITALS — SYSTOLIC BLOOD PRESSURE: 129 MMHG | TEMPERATURE: 97.9 F | DIASTOLIC BLOOD PRESSURE: 70 MMHG | RESPIRATION RATE: 16 BRPM

## 2023-06-15 VITALS — HEART RATE: 84 BPM

## 2023-06-15 LAB — GLUCOSE BLD-MCNC: 214 MG/DL (ref 70–110)

## 2023-06-15 RX ADMIN — METOPROLOL TARTRATE SCH MG: 25 TABLET, FILM COATED ORAL at 08:45

## 2023-06-15 RX ADMIN — Medication SCH MG: at 08:44

## 2023-06-15 RX ADMIN — LEVOTHYROXINE SODIUM SCH MCG: 0.11 TABLET ORAL at 06:20

## 2023-06-15 RX ADMIN — BUDESONIDE SCH MG: 1 SUSPENSION RESPIRATORY (INHALATION) at 07:55

## 2023-06-15 RX ADMIN — OXYCODONE HYDROCHLORIDE AND ACETAMINOPHEN PRN EACH: 10; 325 TABLET ORAL at 04:06

## 2023-06-15 RX ADMIN — LORATADINE, PSEUDOEPHEDRINE SULFATE SCH EACH: 5; 120 TABLET, FILM COATED, EXTENDED RELEASE ORAL at 08:44

## 2023-06-15 RX ADMIN — METHYLPREDNISOLONE SODIUM SUCCINATE SCH MG: 40 INJECTION, POWDER, FOR SOLUTION INTRAMUSCULAR; INTRAVENOUS at 00:03

## 2023-06-15 RX ADMIN — IPRATROPIUM BROMIDE AND ALBUTEROL SULFATE SCH ML: .5; 3 SOLUTION RESPIRATORY (INHALATION) at 11:03

## 2023-06-15 RX ADMIN — METHYLPREDNISOLONE SODIUM SUCCINATE SCH MG: 40 INJECTION, POWDER, FOR SOLUTION INTRAMUSCULAR; INTRAVENOUS at 08:45

## 2023-06-15 RX ADMIN — IPRATROPIUM BROMIDE AND ALBUTEROL SULFATE SCH ML: .5; 3 SOLUTION RESPIRATORY (INHALATION) at 07:55

## 2023-06-15 RX ADMIN — DOXYCYCLINE SCH MG: 100 CAPSULE ORAL at 08:44

## 2023-06-15 RX ADMIN — INSULIN ASPART SCH UNIT: 100 INJECTION, SOLUTION INTRAVENOUS; SUBCUTANEOUS at 06:20

## 2023-06-15 RX ADMIN — ENOXAPARIN SODIUM SCH MG: 30 INJECTION SUBCUTANEOUS at 00:03

## 2023-06-15 RX ADMIN — FUROSEMIDE SCH MG: 40 TABLET ORAL at 08:45

## 2023-06-15 RX ADMIN — FORMOTEROL FUMARATE DIHYDRATE SCH MCG: 20 SOLUTION RESPIRATORY (INHALATION) at 07:55

## 2023-06-15 NOTE — P.PN
Subjective





Patient is seen for follow-up for end-stage renal disease.  She is maintained on

a Monday Wednesday Friday schedule.


Patient is complaining of cough this morning however her respiratory status is 

improved since admission.  She is being discharged today.





Objective





- Vital Signs


Vital signs: 


                                   Vital Signs











Temp  97.9 F   06/15/23 07:00


 


Pulse  84   06/15/23 11:14


 


Resp  16   06/15/23 08:00


 


BP  129/70   06/15/23 07:00


 


Pulse Ox  95   06/15/23 07:00


 


FiO2      








                                 Intake & Output











 06/14/23 06/15/23 06/15/23





 18:59 06:59 18:59


 


Intake Total 400  408


 


Output Total 2400  


 


Balance -2000  408


 


Intake:   


 


  Oral   408


 


  Hemodialysis 400  


 


Output:   


 


  Hemodialysis 2400  


 


Other:   


 


  Voiding Method Toilet Toilet Toilet


 


  # Voids  1 














- Exam





Awake, comfortable, no acute distress


Examination of the heart S1 and S2


Examination lungs bilateral breath sounds are heard occasional wheezing heard


Abdomen is soft obese


Examination lower extremity shows no evidence of edema


CNS exam grossly intact





- Labs


CBC & Chem 7: 


                                 06/12/23 09:17





                                 06/12/23 09:17


Labs: 


                  Abnormal Lab Results - Last 24 Hours (Table)











  06/14/23 06/14/23 06/14/23 Range/Units





  06:40 17:17 20:21 


 


POC Glucose (mg/dL)   241 H  300 H  ()  mg/dL


 


Hemoglobin A1c  6.1 H    (<=6.0)  %














  06/15/23 Range/Units





  06:14 


 


POC Glucose (mg/dL)  214 H  ()  mg/dL


 


Hemoglobin A1c   (<=6.0)  %














Assessment and Plan


Assessment: 





Assessment:


1.  End-stage renal disease maintained on hemodialysis on Monday Wednesday Friday schedule.


2.  Shortness of breath possibly from URI.  ?COPD exac. On antibiotics and 

steroids. 


3.  Hypertension with chronic kidney disease.  Stable.


4.  History of hemorrhagic CVA.


5.  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 


6.  Metabolic acidosis due to CKD. Expect improvement post HD.





Plan: 





Hemodialysis in a.m. as outpatient.

## 2023-06-15 NOTE — P.DS
Providers


Date of admission: 


06/12/23 12:31





Expected date of discharge: 06/15/23


Attending physician: 


Rogelio Valle





Consults: 





                                        





06/12/23 12:31


Consult Physician Urgent 


   Consulting Provider: James Garcia


   Consult Reason/Comments: ESRD on dialysis, missed dialysis today


   Do you want consulting provider notified?: Yes











Primary care physician: 


Esau Ashraf





[facility] Course: 





Chief Complaint: Short of breath





History of presenting complaint:


This is a pleasant 77 a patient of Dr. Ashraf.  Chronic stable medical 

conditions include hypertension, hyperlipidemia, depression, anxiety, 

osteoarthritis.  End-stage kidney disease on hemodialysis.


Patient presents to the ER with worsening short of breath.  Cough.  Some nasal 

drainage.  Relevant to current of which is not known.  No obvious fever and 

chills.  Appetite is fair.  Some edema.  Did not get hemodialysis today.  No 

chest pain.


Admitted with acute COPD exacerbation, acute bronchitis, acute fluid overload.  

Bronchodilators.  Doxycycline.  Hemodialysis.


June 13: Breathing a bit better.  Dose also wheezing.  Getting bronchodilators. 

Doxycycline.  Eating better.  Does not like hospital food.


June 14: Breathing better.  hemodialyzed today.  Had a bout of coughing and 

wheezing earlier.  Better now.  Requesting 1 more day stay in hospital.  No 

sputum.


Andria 15: Breathing overall much better.  Discussed with the patient using 

incentive spirometry.  And ambulating and sitting up in a chair.  Eating well.  

His hemodialysis yesterday.  We will discharge on DuoNeb, prednisone tapered in 

Symbicort.  Mucinex.


Discussion and discharge planning more than 35 minutes











Past medical history to include:


Hypertension, hyperlipidemia, depression, anxiety, osteoarthritis, end-stage 

kidney disease on hemodialysis, uterine cancer, bariatric surgery, depression, 

osteoarthritis





Social history:


Quit smoking over 30 years ago.  Lives alone





Physical examination:


VITAL SIGNS: 97.9, 80, 16, 129/70, 95% room air


GENERAL: BMI 42.5, renal, comfortable


EYES: Pupils equal.  Conjunctiva normal.


HEENT: External appearance of nose and ears normal, oral cavity grossly normal.


NECK: JVD not raised; masses not palpable.


HEART: First and second heart sounds are normal; some edema.  


LUNGS: Respiratory rate normal; decreased breath sound


ABDOMEN: Soft,  nontender, liver spleen not palpable, no masses palpable.  


PSYCH: Alert and oriented x3;  mood  and affect anxiousl.  


MUSCULOSKELETAL:No Clubbing/cyanosis;muscles-grossly intact.  OA














INVESTIGATIONS, reviewed in the clinical context:


White count 5.7 hemoglobin 10.6 platelets 186 sodium 133 potassium 4.3 BUN 42 

creatinine 5.47


Influenza type A, B, RSV, COVID-19: Not detected


EKG tracing personally reviewed by me-normal sinus rhythm.  Anterior leads poor 

R-wave progression


Chest x-ray film personally reviewed by me-right diaphragm elevated.  Some 

venous prominence





Assessment and plan:





-Acute COPD exacerbation in a prior smoker: Better


DuoNeb 4 times a day, nebulized Perforomist, Pulmicort.  IV Solu-Medrol


Discharged on DuoNeb 3 times a day, prednisone taper, Symbicort 1 puff twice a 

day, Mucinex





-Postnasal drainage.


Claritin-D





-Acute bronchitis


Doxycycline-3 days





-Acute fluid overload from incomplete hemodialysis/pulmonary edema: Better


Hemodialysis.





-Anemia of chronic kidney disease








-End-stage kidney disease on hemodialysis.  From diabetic nephropathy


Follow with nephrology








-Hyperlipidemia Crestor 10 mg daily at bedtime





-Essential hypertension, 


Lisinopril 40 mg daily, Norvasc 10 mg daily, Lopressor 25 mg twice a day








-Primary osteoarthritis


Tylenol as needed





-Morbid obesity BMI 2.5


Weight loss measures








-DO NOT RESUSCITATE





Disposition:


Home











Plan - Discharge Summary


New Discharge Prescriptions: 


New


   Loratadine-Pseudoeph 5-120 mg [Claritin-D 12 Hour] 1 each PO Q12HR #10 tab


   Ipratropium-Albuterol Nebulize [Duoneb 0.5 mg-3 mg/3 ml Soln] 3 ml INHALATION

TID #90 each


   predniSONE 10 mg PO DAILY #30 tab


   Budesonide/Formoterol Fumarate [Symbicort 160-4.5 Mcg Inhaler] 1 puff 

INHALATION BID #10.2 gm


   Doxycycline [Vibramycin] 100 mg PO BID #6 cap


   guaiFENesin [Mucinex] 600 mg PO QID #30 tab





Continue


   Furosemide [Lasix] 40 mg PO DAILY


   Levothyroxine Sodium [Synthroid] 112 mcg PO AC-BRKFST


   Rosuvastatin Calcium [Crestor] 10 mg PO HS


   Metoprolol Tartrate [Lopressor] 25 mg PO BID


   Calcium Acetate 1,334 mg PO AC-TID


   amLODIPine [Norvasc] 10 mg PO DAILY


   Ergocalciferol [Vitamin D2 (1250 Mcg = 89220 Iu)] 1,250 mcg PO MO


   lisinopriL 40 mg PO DAILY


   oxyCODONE-APAP 10-325MG [Percocet  mg] 1 tab PO TID PRN


     PRN Reason: Pain


   Albuterol Inhaler [Ventolin Hfa Inhaler] 2 puff INHALATION RT-QID PRN


     PRN Reason: Shortness Of Breath


   Albuterol Nebulized [Ventolin Nebulized] 2.5 mg INHALATION RT-QID PRN


     PRN Reason: Shortness Of Breath


   Nephro-Hyacinth Tab 1 tab PO HS


   Lidocaine-Prilocaine Cream [Emla Cream 2.5%/2.5%] 1 applic TOPICAL DAILY PRN


     PRN Reason: dialysis





Discontinued


   hydrALAZINE HCL [Apresoline] 25 mg PO HS


   Docusate [Colace] 100 mg PO Q4H PRN


     PRN Reason: Constipation


Discharge Medication List





Furosemide [Lasix] 40 mg PO DAILY 02/04/20 [History]


Levothyroxine Sodium [Synthroid] 112 mcg PO AC-BRKFST 02/04/20 [History]


Rosuvastatin Calcium [Crestor] 10 mg PO HS 02/04/20 [History]


Calcium Acetate 1,334 mg PO AC-TID 11/09/20 [History]


Metoprolol Tartrate [Lopressor] 25 mg PO BID 11/09/20 [History]


amLODIPine [Norvasc] 10 mg PO DAILY 06/21/21 [History]


Ergocalciferol [Vitamin D2 (1250 Mcg = 28162 Iu)] 1,250 mcg PO MO 08/27/21 

[History]


Nephro-Hyacinth Tab 1 tab PO HS 11/28/22 [History]


lisinopriL 40 mg PO DAILY 11/28/22 [History]


oxyCODONE-APAP 10-325MG [Percocet  mg] 1 tab PO TID PRN 01/07/23 [History]


Albuterol Inhaler [Ventolin Hfa Inhaler] 2 puff INHALATION RT-QID PRN 05/04/23 

[History]


Albuterol Nebulized [Ventolin Nebulized] 2.5 mg INHALATION RT-QID PRN 05/04/23 

[History]


Lidocaine-Prilocaine Cream [Emla Cream 2.5%/2.5%] 1 applic TOPICAL DAILY PRN 

05/04/23 [History]


Budesonide/Formoterol Fumarate [Symbicort 160-4.5 Mcg Inhaler] 1 puff INHALATION

BID #10.2 gm 06/15/23 [Rx]


Doxycycline [Vibramycin] 100 mg PO BID #6 cap 06/15/23 [Rx]


Ipratropium-Albuterol Nebulize [Duoneb 0.5 mg-3 mg/3 ml Soln] 3 ml INHALATION 

TID #90 each 06/15/23 [Rx]


Loratadine-Pseudoeph 5-120 mg [Claritin-D 12 Hour] 1 each PO Q12HR #10 tab 

06/15/23 [Rx]


guaiFENesin [Mucinex] 600 mg PO QID #30 tab 06/15/23 [Rx]


predniSONE 10 mg PO DAILY #30 tab 06/15/23 [Rx]








Follow up Appointment(s)/Referral(s): 


Esau Ashraf DO [Primary Care Provider] - 1-2 days


Suraj Escudero DO [Doctor of Osteopathic Medicine] - 06/28/23 9:45 am


Patient Instructions/Handouts:  COPD (Chronic Obstructive Pulmonary Disease) 

(GEN), Chronic Lung Disease and Infection Prevention (DC), Energy Conservation 

Techniques (ED)


Discharge/Stand Alone Forms:  Assisted Living Facilities


Discharge Disposition: HOME SELF-CARE

## 2023-11-02 ENCOUNTER — HOSPITAL ENCOUNTER (OUTPATIENT)
Dept: HOSPITAL 47 - ORWHC2ENDO | Age: 78
Discharge: HOME | End: 2023-11-02
Attending: SURGERY
Payer: MEDICARE

## 2023-11-02 VITALS — HEART RATE: 82 BPM | SYSTOLIC BLOOD PRESSURE: 153 MMHG | DIASTOLIC BLOOD PRESSURE: 80 MMHG

## 2023-11-02 VITALS — BODY MASS INDEX: 39.8 KG/M2

## 2023-11-02 VITALS — TEMPERATURE: 98 F | RESPIRATION RATE: 18 BRPM

## 2023-11-02 DIAGNOSIS — Z79.51: ICD-10-CM

## 2023-11-02 DIAGNOSIS — Z79.83: ICD-10-CM

## 2023-11-02 DIAGNOSIS — E03.9: ICD-10-CM

## 2023-11-02 DIAGNOSIS — Z12.11: Primary | ICD-10-CM

## 2023-11-02 DIAGNOSIS — Z79.899: ICD-10-CM

## 2023-11-02 DIAGNOSIS — K21.00: ICD-10-CM

## 2023-11-02 DIAGNOSIS — J44.9: ICD-10-CM

## 2023-11-02 DIAGNOSIS — Z86.010: ICD-10-CM

## 2023-11-02 DIAGNOSIS — J45.909: ICD-10-CM

## 2023-11-02 DIAGNOSIS — E11.9: ICD-10-CM

## 2023-11-02 DIAGNOSIS — Z79.890: ICD-10-CM

## 2023-11-02 DIAGNOSIS — E78.5: ICD-10-CM

## 2023-11-02 DIAGNOSIS — Z88.6: ICD-10-CM

## 2023-11-02 DIAGNOSIS — D12.3: ICD-10-CM

## 2023-11-02 DIAGNOSIS — Z88.3: ICD-10-CM

## 2023-11-02 DIAGNOSIS — I25.2: ICD-10-CM

## 2023-11-02 DIAGNOSIS — K57.30: ICD-10-CM

## 2023-11-02 DIAGNOSIS — I11.0: ICD-10-CM

## 2023-11-02 DIAGNOSIS — K64.1: ICD-10-CM

## 2023-11-02 DIAGNOSIS — Z79.84: ICD-10-CM

## 2023-11-02 LAB
ANION GAP SERPL CALC-SCNC: 14 MMOL/L
BUN SERPL-SCNC: 22 MG/DL (ref 7–17)
CALCIUM SPEC-MCNC: 8.9 MG/DL (ref 8.4–10.2)
CHLORIDE SERPL-SCNC: 99 MMOL/L (ref 98–107)
CO2 SERPL-SCNC: 29 MMOL/L (ref 22–30)
GLUCOSE BLD-MCNC: 114 MG/DL (ref 70–110)
GLUCOSE SERPL-MCNC: 121 MG/DL (ref 74–99)
POTASSIUM SERPL-SCNC: 3.7 MMOL/L (ref 3.5–5.1)
SODIUM SERPL-SCNC: 142 MMOL/L (ref 137–145)

## 2023-11-02 PROCEDURE — 43249 ESOPH EGD DILATION <30 MM: CPT

## 2023-11-02 PROCEDURE — 88305 TISSUE EXAM BY PATHOLOGIST: CPT

## 2023-11-02 PROCEDURE — 45385 COLONOSCOPY W/LESION REMOVAL: CPT

## 2023-11-02 PROCEDURE — 80048 BASIC METABOLIC PNL TOTAL CA: CPT

## 2023-11-02 NOTE — P.PCN
Date of Procedure: 11/02/23


Description of Procedure: 





PREOPERATIVE DIAGNOSIS:


Dysphagia.


Nausea with vomiting.


History of gastric bypass





POSTOPERATIVE DIAGNOSIS:


Dysphagia.


Upper esophageal stenosis


Presbyesophagus


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





OPERATION:


Esophagogastrojejunoscopy with balloon dilatation from 15 to 20 mm for gastric 

stricture


Esophagogastrojejunoscopy with rigid dilator, 54-French to address upper 

esophageal stenosis








SURGEON: Ara Alfaro MD





ANESTHESIA: MAC.





INDICATIONS:


The patient is a 78-year-old female who presents with a history of gastric 

bypass, dysphagia, including nausea and vomiting.  Benefits and risks of the 

procedure were described. Informed consent was obtained.





DESCRIPTION:


The patient was brought into the endoscopy suite and laid in the left lateral 

decubitus position.  After a timeout was confirmed, the procedure was initiated.

An Olympus gastroscope was passed along the posterior oropharynx down to the 

distal esophagus where the squamocolumnar junction was unremarkable.  Moderate 

tertiary contractions consistent with presbyesophagus was found. The gastric 

pouch was entered.  A gastrojejunal stricture of 15 mm was found as the adult 

gastroscope was 9.5 mm in size. 





Attention was brought to the upper esophageal stenosis.  The bite block was 

removed and mouth was opened with fingers. A rigid dilator, 51 French was placed

over a guidewire to 45 cm from the incisors and left for 2 minutes after 

exchanging the scope.  A [employer]k balloon dilator was placed through 

the scope.   The scope was reentered for balloon dilation of the gastrojejunal 

anastomosis.  Final insufflation from 15 to 20 mm was performed with a total of 

2 minutes.  The scope was advanced up to 60 cm from the incisors into the Isaiah 

limb. The mucosa of the gastrojejunal anastomosis was intact.  No chronic 

gastrojejunal marginal ulcer was encountered.  No full-thickness injury was 

encountered.  The GI tract was desufflated.  The patient tolerated the procedure

well.  





FINDINGS:


Squamocolumnar junction unremarkable at 40 cm.


Stricture of approximately 15 mm encountered.


Moderate to severe tertiary contractions for presbyesophagus 


Rigid dilation of upper esophageal sphincter, 54-French 


No chronic gastrojejunal ulceration encountered.


Successful balloon dilatation to 20 mm for jejunal stricture





RECOMMENDATIONS:


Recommend blenderized diet due to combined presbyesophagus and esophageal 

stenosis


Upper endoscopy as needed.

## 2023-11-02 NOTE — P.GSHP
History of Present Illness


H&P Date: 11/02/23














CHIEF COMPLAINT: Colon screen





HISTORY OF PRESENT ILLNESS: The patient is a 78-year-old female who


presents for colon screen.  Lower endoscopy was offered for further evaluation 

and management.





PAST MEDICAL HISTORY: 


Please see list.





PAST SURGICAL HISTORY: 


Please see list.





MEDICATIONS: 


Please see list.





ALLERGIES:  Please see list. 





SOCIAL HISTORY: No illicit drug use





FAMILY HISTORY: No reports of Crohn disease or ulcerative colitis. 





REVIEW OF ORGAN SYSTEMS: 


CONSTITUTIONAL: No reports of fevers or chills.  





PHYSICAL EXAM: 


VITAL SIGNS:  Stable


GENERAL: Well-developed pleasant in no acute distress. 


HEENT: No scleral icterus. Extraocular movements grossly


intact. Moist buccal mucosa. 


NECK: Supple without lymphadenopathy. 


CHEST: Unlabored respirations. Equal bilateral excursions. 


CARDIOVASCULAR: Regular rate and rhythm. Distal 2+ pulses. 


ABDOMEN: Soft, nontender, nondistended. 


MUSCULOSKELETAL: No clubbing, cyanosis, or edema. 





ASSESSMENT: 


1.  Colon screen.





PLAN: 


1. Recommend proceeding with a lower endoscopy





Past Medical History


Past Medical History: Asthma, Cancer, COPD, Diabetes Mellitus, Dialysis, 

Hyperlipidemia, Hypertension, Myocardial Infarction (MI), Osteoarthritis (OA), 

Pneumonia, Renal Disease, Thyroid Disorder


Additional Past Medical History / Comment(s): Hx Uterine cancer 25 yrs ago, 

hemodialysis Mon, Wed and Fri, varicose veins., "mild heart attack"" "brain 

bleed twice" chronic diarrhea, hx "14 polyps", "chronic back pain", anemia, 

current edema rufina feet, diet control diabetic


Last Myocardial Infarction Date:: unknown


History of Any Multi-Drug Resistant Organisms: None Reported


Past Surgical History: Appendectomy, Bariatric Surgery, Cholecystectomy, 

Hysterectomy, Joint Replacement, Tonsillectomy


Additional Past Surgical History / Comment(s): gastric bypass, surgery after 

fall -fx rt leg and foot, rufina knee replacement, rufina cataracts, surgery x 2 or 

"brain bleed", fistula for dialysis, COLONOSCOPY,


Past Anesthesia/Blood Transfusion Reactions: Family History of Problems w/ 

Anesthesia


Additional Past Anesthesia/Blood Transfusion Reaction / Comment(s): MOTHER PONV


Smoking Status: Former smoker





- Past Family History


  ** Mother


Family Medical History: Cancer





  ** Father


Family Medical History: Diabetes Mellitus, Hypertension





  ** Sister(s)


Family Medical History: Cancer





Medications and Allergies


                                Home Medications











 Medication  Instructions  Recorded  Confirmed  Type


 


Furosemide [Lasix] 20 mg PO BID 02/04/20 10/31/23 History


 


Levothyroxine Sodium [Synthroid] 112 mcg PO AC-BRKFST 02/04/20 10/31/23 History


 


Rosuvastatin Calcium [Crestor] 10 mg PO HS 02/04/20 10/31/23 History


 


Calcium Acetate 1,334 mg PO AC-TID 11/09/20 10/31/23 History


 


Metoprolol Tartrate [Lopressor] 25 mg PO DAILY 11/09/20 10/31/23 History


 


amLODIPine [Norvasc] 10 mg PO DAILY 06/21/21 10/31/23 History


 


oxyCODONE-APAP 10-325MG [Percocet 1 tab PO TID PRN 01/07/23 10/31/23 History





 mg]    


 


Albuterol Inhaler [Ventolin Hfa 2 puff INHALATION RT-QID PRN 05/04/23 10/31/23 

History





Inhaler]    


 


Albuterol Nebulized [Ventolin 2.5 mg INHALATION RT-QID PRN 05/04/23 10/31/23 

History





Nebulized]    


 


Ipratropium-Albuterol Nebulize 3 ml INHALATION TID #90 each 06/15/23 10/31/23 Rx





[Duoneb 0.5 mg-3 mg/3 ml Soln]    


 


Vit B Comp No.3/Folic/C/Biotin 1 each PO DAILY 10/31/23 10/31/23 History





[Bella-Hyacinth Rx Tablet]    








                                    Allergies











Allergy/AdvReac Type Severity Reaction Status Date / Time


 


naproxen [From Naprosyn] Allergy  Dyspnea Verified 10/31/23 11:10


 


blood thinner Allergy  "brain Uncoded 10/31/23 11:10





   bleed"  














Surgical - Exam


                                   Vital Signs











Temp Pulse Resp BP Pulse Ox


 


 98 F   69   18   143/69   97 


 


 11/02/23 07:28  11/02/23 07:28  11/02/23 07:28  11/02/23 07:28  11/02/23 07:28














Results





- Labs


                  Abnormal Lab Results - Last 24 Hours (Table)











  11/02/23 Range/Units





  07:25 


 


POC Glucose (mg/dL)  114 H  ()  mg/dL

## 2023-11-02 NOTE — P.PCN
Date of Procedure: 11/02/23


Description of Procedure: 




















PREOPERATIVE DIAGNOSIS:


Personal history of colon polyps


Colonoscopy screening





POSTOPERATIVE DIAGNOSIS:


Tubular adenoma transverse colon


Sigmoid diverticulosis, severe


Internal hemorrhoids, grade 2


Arteriovenous malformation, proximal ascending colon





OPERATION:


Colonoscopy to the ileocecal valve and appendiceal orifice, cecum


Colonoscopy with hot snare polypectomy





SURGEON: Ara Alfaro MD.





ANESTHESIA: MAC.





INDICATIONS:


The patient is an 78-year-old female who presents personal history of colon 

polyps.  Last colonoscopy 5 years.  Benefits and risks were described and 

informed consent was obtained.





DESCRIPTION OF PROCEDURE:


The patient had undergone Suprep.  The patient had been brought into the 

operating room and laid in the left lateral decubitus position.  After adequate 

intravenous sedation, the rectum was examined with 2% lidocaine jelly. External 

hemorrhoids were encountered. The rectal tone was within normal limits. No 

lesions were palpated in the rectal vault. An Olympus colonoscope was advanced 

until the cecum, ileocecal valve and appendiceal orifice were clearly viewed.  

The prep was fair.  Sigmoid diverticulosis, severe was encountered.  Colonic 

polyps were found and removed.  No evidence of focal colitis was found. 

Retroflexion of the scope demonstrated grade 2 internal hemorrhoids without 

active bleeding or inflammation. The colon was desufflated. The patient had 

tolerated the procedure well. 





Withdrawal time was over 6 minutes.





FINDINGS:


Aronchick preparation quality scale 3 (1-5)


Internal hemorrhoids, grade 2


External hemorrhoids, grade 2.


Arteriovenous malformations proximal ascending colon, 


Sigmoid diverticulosis, severe


Removal of 1 polyps:


- Snare polypectomy mid transverse colon, 5 mm polyp.


No focal colitis.





RECOMMENDATIONS:


Repeat colonoscopy in 3 years, 2026





Plan - Discharge Summary


Discharge Rx Participant: No


New Discharge Prescriptions: 


Continue


   Furosemide [Lasix] 20 mg PO BID


   Levothyroxine Sodium [Synthroid] 112 mcg PO AC-BRKFST


   Rosuvastatin Calcium [Crestor] 10 mg PO HS


   Metoprolol Tartrate [Lopressor] 25 mg PO DAILY


   Calcium Acetate 1,334 mg PO AC-TID


   amLODIPine [Norvasc] 10 mg PO DAILY


   oxyCODONE-APAP 10-325MG [Percocet  mg] 1 tab PO TID PRN


     PRN Reason: Pain


   Albuterol Inhaler [Ventolin Hfa Inhaler] 2 puff INHALATION RT-QID PRN


     PRN Reason: Shortness Of Breath


   Albuterol Nebulized [Ventolin Nebulized] 2.5 mg INHALATION RT-QID PRN


     PRN Reason: Shortness Of Breath


   Ipratropium-Albuterol Nebulize [Duoneb 0.5 mg-3 mg/3 ml Soln] 3 ml INHALATION

TID #90 each


   Vit B Comp No.3/Folic/C/Biotin [Bella-Hyacinth Rx Tablet] 1 each PO DAILY


Discharge Medication List





Furosemide [Lasix] 20 mg PO BID 02/04/20 [History]


Levothyroxine Sodium [Synthroid] 112 mcg PO AC-BRKFST 02/04/20 [History]


Rosuvastatin Calcium [Crestor] 10 mg PO HS 02/04/20 [History]


Calcium Acetate 1,334 mg PO AC-TID 11/09/20 [History]


Metoprolol Tartrate [Lopressor] 25 mg PO DAILY 11/09/20 [History]


amLODIPine [Norvasc] 10 mg PO DAILY 06/21/21 [History]


oxyCODONE-APAP 10-325MG [Percocet  mg] 1 tab PO TID PRN 01/07/23 [History]


Albuterol Inhaler [Ventolin Hfa Inhaler] 2 puff INHALATION RT-QID PRN 05/04/23 

[History]


Albuterol Nebulized [Ventolin Nebulized] 2.5 mg INHALATION RT-QID PRN 05/04/23 

[History]


Ipratropium-Albuterol Nebulize [Duoneb 0.5 mg-3 mg/3 ml Soln] 3 ml INHALATION 

TID #90 each 06/15/23 [Rx]


Vit B Comp No.3/Folic/C/Biotin [Bella-Hyacinth Rx Tablet] 1 each PO DAILY 10/31/23 

[History]








Follow up Appointment(s)/Referral(s): 


Ara Alfaro MD [STAFF PHYSICIAN] - 11/21/23 10:00 am


Patient Instructions/Handouts:  *Surgery [facility] - (Anesthesia) Discharge 

Instructions Outpatient Surgery, Diverticulosis Diet (GEN), Colorectal Polyps 

(GEN), Esophageal Dilation (DC)


Activity/Diet/Wound Care/Special Instructions: 


Repeat colonoscopy in 3 years, 2026


Discharge Disposition: HOME SELF-CARE

## 2023-12-06 ENCOUNTER — HOSPITAL ENCOUNTER (INPATIENT)
Dept: HOSPITAL 47 - EC | Age: 78
LOS: 5 days | Discharge: HOME HEALTH | DRG: 177 | End: 2023-12-11
Attending: HOSPITALIST | Admitting: HOSPITALIST
Payer: MEDICARE

## 2023-12-06 DIAGNOSIS — E87.20: ICD-10-CM

## 2023-12-06 DIAGNOSIS — Z88.8: ICD-10-CM

## 2023-12-06 DIAGNOSIS — Z88.1: ICD-10-CM

## 2023-12-06 DIAGNOSIS — K30: ICD-10-CM

## 2023-12-06 DIAGNOSIS — J44.0: ICD-10-CM

## 2023-12-06 DIAGNOSIS — Z96.653: ICD-10-CM

## 2023-12-06 DIAGNOSIS — I25.2: ICD-10-CM

## 2023-12-06 DIAGNOSIS — R03.1: ICD-10-CM

## 2023-12-06 DIAGNOSIS — G89.29: ICD-10-CM

## 2023-12-06 DIAGNOSIS — Z87.19: ICD-10-CM

## 2023-12-06 DIAGNOSIS — D63.1: ICD-10-CM

## 2023-12-06 DIAGNOSIS — N18.6: ICD-10-CM

## 2023-12-06 DIAGNOSIS — I12.0: ICD-10-CM

## 2023-12-06 DIAGNOSIS — U07.1: Primary | ICD-10-CM

## 2023-12-06 DIAGNOSIS — Z88.6: ICD-10-CM

## 2023-12-06 DIAGNOSIS — E87.5: ICD-10-CM

## 2023-12-06 DIAGNOSIS — E86.0: ICD-10-CM

## 2023-12-06 DIAGNOSIS — Z85.42: ICD-10-CM

## 2023-12-06 DIAGNOSIS — Z87.891: ICD-10-CM

## 2023-12-06 DIAGNOSIS — M54.9: ICD-10-CM

## 2023-12-06 DIAGNOSIS — Z86.73: ICD-10-CM

## 2023-12-06 DIAGNOSIS — M19.90: ICD-10-CM

## 2023-12-06 DIAGNOSIS — E87.6: ICD-10-CM

## 2023-12-06 DIAGNOSIS — Z99.2: ICD-10-CM

## 2023-12-06 DIAGNOSIS — Z79.899: ICD-10-CM

## 2023-12-06 DIAGNOSIS — Z79.890: ICD-10-CM

## 2023-12-06 DIAGNOSIS — Z98.84: ICD-10-CM

## 2023-12-06 DIAGNOSIS — Z79.51: ICD-10-CM

## 2023-12-06 DIAGNOSIS — Z88.2: ICD-10-CM

## 2023-12-06 DIAGNOSIS — E11.22: ICD-10-CM

## 2023-12-06 DIAGNOSIS — E66.9: ICD-10-CM

## 2023-12-06 DIAGNOSIS — M89.8X9: ICD-10-CM

## 2023-12-06 DIAGNOSIS — E78.5: ICD-10-CM

## 2023-12-06 LAB
ALBUMIN SERPL-MCNC: 3.5 G/DL (ref 3.5–5)
ALP SERPL-CCNC: 194 U/L (ref 38–126)
ALT SERPL-CCNC: 26 U/L (ref 4–34)
ANION GAP SERPL CALC-SCNC: 15 MMOL/L
APTT BLD: 22.3 SEC (ref 22–30)
AST SERPL-CCNC: 38 U/L (ref 14–36)
BASOPHILS # BLD AUTO: 0 K/UL (ref 0–0.2)
BASOPHILS NFR BLD AUTO: 0 %
BUN SERPL-SCNC: 27 MG/DL (ref 7–17)
CALCIUM SPEC-MCNC: 8.5 MG/DL (ref 8.4–10.2)
CHLORIDE SERPL-SCNC: 94 MMOL/L (ref 98–107)
CO2 SERPL-SCNC: 26 MMOL/L (ref 22–30)
EOSINOPHIL # BLD AUTO: 0.1 K/UL (ref 0–0.7)
EOSINOPHIL NFR BLD AUTO: 3 %
ERYTHROCYTE [DISTWIDTH] IN BLOOD BY AUTOMATED COUNT: 2.95 M/UL (ref 3.8–5.4)
ERYTHROCYTE [DISTWIDTH] IN BLOOD: 14.2 % (ref 11.5–15.5)
GLUCOSE SERPL-MCNC: 165 MG/DL (ref 74–99)
HCT VFR BLD AUTO: 29.6 % (ref 34–46)
HGB BLD-MCNC: 9.8 GM/DL (ref 11.4–16)
INR PPP: 0.9 (ref ?–1.2)
LYMPHOCYTES # SPEC AUTO: 1 K/UL (ref 1–4.8)
LYMPHOCYTES NFR SPEC AUTO: 25 %
MCH RBC QN AUTO: 33.3 PG (ref 25–35)
MCHC RBC AUTO-ENTMCNC: 33.2 G/DL (ref 31–37)
MCV RBC AUTO: 100.4 FL (ref 80–100)
MONOCYTES # BLD AUTO: 0.3 K/UL (ref 0–1)
MONOCYTES NFR BLD AUTO: 7 %
NEUTROPHILS # BLD AUTO: 2.6 K/UL (ref 1.3–7.7)
NEUTROPHILS NFR BLD AUTO: 62 %
NT-PROBNP SERPL-MCNC: 4360 PG/ML
PLATELET # BLD AUTO: 194 K/UL (ref 150–450)
POTASSIUM SERPL-SCNC: 3 MMOL/L (ref 3.5–5.1)
PROT SERPL-MCNC: 6.3 G/DL (ref 6.3–8.2)
PT BLD: 9.8 SEC (ref 10–12.5)
SODIUM SERPL-SCNC: 135 MMOL/L (ref 137–145)
WBC # BLD AUTO: 4.2 K/UL (ref 3.8–10.6)

## 2023-12-06 PROCEDURE — 90935 HEMODIALYSIS ONE EVALUATION: CPT

## 2023-12-06 PROCEDURE — 71046 X-RAY EXAM CHEST 2 VIEWS: CPT

## 2023-12-06 PROCEDURE — 85610 PROTHROMBIN TIME: CPT

## 2023-12-06 PROCEDURE — 82728 ASSAY OF FERRITIN: CPT

## 2023-12-06 PROCEDURE — 83615 LACTATE (LD) (LDH) ENZYME: CPT

## 2023-12-06 PROCEDURE — 80053 COMPREHEN METABOLIC PANEL: CPT

## 2023-12-06 PROCEDURE — 85025 COMPLETE CBC W/AUTO DIFF WBC: CPT

## 2023-12-06 PROCEDURE — 93005 ELECTROCARDIOGRAM TRACING: CPT

## 2023-12-06 PROCEDURE — 36415 COLL VENOUS BLD VENIPUNCTURE: CPT

## 2023-12-06 PROCEDURE — 99285 EMERGENCY DEPT VISIT HI MDM: CPT

## 2023-12-06 PROCEDURE — 87340 HEPATITIS B SURFACE AG IA: CPT

## 2023-12-06 PROCEDURE — 83735 ASSAY OF MAGNESIUM: CPT

## 2023-12-06 PROCEDURE — 83880 ASSAY OF NATRIURETIC PEPTIDE: CPT

## 2023-12-06 PROCEDURE — 86140 C-REACTIVE PROTEIN: CPT

## 2023-12-06 PROCEDURE — 85730 THROMBOPLASTIN TIME PARTIAL: CPT

## 2023-12-06 PROCEDURE — 84484 ASSAY OF TROPONIN QUANT: CPT

## 2023-12-06 PROCEDURE — 80048 BASIC METABOLIC PNL TOTAL CA: CPT

## 2023-12-06 PROCEDURE — 86706 HEP B SURFACE ANTIBODY: CPT

## 2023-12-06 PROCEDURE — 83605 ASSAY OF LACTIC ACID: CPT

## 2023-12-06 PROCEDURE — 81001 URINALYSIS AUTO W/SCOPE: CPT

## 2023-12-06 PROCEDURE — 87636 SARSCOV2 & INF A&B AMP PRB: CPT

## 2023-12-06 PROCEDURE — 71275 CT ANGIOGRAPHY CHEST: CPT

## 2023-12-06 PROCEDURE — 82746 ASSAY OF FOLIC ACID SERUM: CPT

## 2023-12-06 PROCEDURE — 83540 ASSAY OF IRON: CPT

## 2023-12-06 PROCEDURE — 8E0ZXY6 ISOLATION: ICD-10-PCS

## 2023-12-06 PROCEDURE — 82607 VITAMIN B-12: CPT

## 2023-12-06 PROCEDURE — 83550 IRON BINDING TEST: CPT

## 2023-12-06 PROCEDURE — 96374 THER/PROPH/DIAG INJ IV PUSH: CPT

## 2023-12-06 PROCEDURE — 85379 FIBRIN DEGRADATION QUANT: CPT

## 2023-12-06 RX ADMIN — Medication SCH MG: at 17:48

## 2023-12-06 RX ADMIN — HYDROMORPHONE HYDROCHLORIDE PRN MG: 1 INJECTION, SOLUTION INTRAMUSCULAR; INTRAVENOUS; SUBCUTANEOUS at 23:34

## 2023-12-06 RX ADMIN — OXYCODONE HYDROCHLORIDE AND ACETAMINOPHEN SCH MG: 500 TABLET ORAL at 17:48

## 2023-12-06 RX ADMIN — HYDROMORPHONE HYDROCHLORIDE PRN MG: 1 INJECTION, SOLUTION INTRAMUSCULAR; INTRAVENOUS; SUBCUTANEOUS at 19:56

## 2023-12-06 NOTE — XR
EXAMINATION TYPE: XR chest 2V

 

DATE OF EXAM: 12/6/2023

 

COMPARISON: 6/12/2023

 

TECHNIQUE: PA and lateral views submitted.

 

HISTORY: Cough

 

FINDINGS:

The lungs are clear and  there is no pneumothorax, pleural effusion, or focal pneumonia.  Heart size 
normal and no overt failure. Osseous structures demonstrate hypertrophic and degenerative changes of 
the spine. Elevated right hemidiaphragm can occasionally be associated with phrenic nerve paresis. Pr
ominence of the right paratracheal stripe likely related to ectatic vasculature. Mild atherosclerotic
 change aorta. Exam limited assessment of the hilum due to reduced inspiration and slight rotation.

 

IMPRESSION: 

1. No acute process.

## 2023-12-06 NOTE — ED
General Adult HPI





- General


Chief complaint: Weakness


Stated complaint: Weakness,Vomiting


Time Seen by Provider: 12/06/23 14:37


Source: patient


Mode of arrival: wheelchair


Limitations: no limitations





- History of Present Illness


Initial comments: 


78-year-old female with a past medical history significant for hypertension and 

end-stage renal disease on hemodialysis (M-W-F) presenting to the ED with a 

chief complaint of generalized fatigue.  She states for the past week and a half

has been generally fatigued, has had congestion, cough, nausea, and diarrhea.  

No chest pain or shortness of breath.  No urinary symptoms.  States that she is 

so weak that she had difficulties getting out of bed this morning which patient 

reports is not usual for her.  No other complaints.








- Related Data


                                Home Medications











 Medication  Instructions  Recorded  Confirmed


 


Furosemide [Lasix] 20 mg PO BID 02/04/20 10/31/23


 


Levothyroxine Sodium [Synthroid] 112 mcg PO AC-BRKFST 02/04/20 10/31/23


 


Rosuvastatin Calcium [Crestor] 10 mg PO HS 02/04/20 10/31/23


 


Calcium Acetate 1,334 mg PO AC-TID 11/09/20 10/31/23


 


Metoprolol Tartrate [Lopressor] 25 mg PO DAILY 11/09/20 10/31/23


 


amLODIPine [Norvasc] 10 mg PO DAILY 06/21/21 10/31/23


 


oxyCODONE-APAP 10-325MG [Percocet 1 tab PO TID PRN 01/07/23 10/31/23





 mg]   


 


Albuterol Inhaler [Ventolin Hfa 2 puff INHALATION RT-QID PRN 05/04/23 10/31/23





Inhaler]   


 


Albuterol Nebulized [Ventolin 2.5 mg INHALATION RT-QID PRN 05/04/23 10/31/23





Nebulized]   


 


Vit B Comp No.3/Folic/C/Biotin 1 each PO DAILY 10/31/23 10/31/23





[Bella-Hyacinth Rx Tablet]   








                                  Previous Rx's











 Medication  Instructions  Recorded


 


Ipratropium-Albuterol Nebulize 3 ml INHALATION TID #90 each 06/15/23





[Duoneb 0.5 mg-3 mg/3 ml Soln]  











                                    Allergies











Allergy/AdvReac Type Severity Reaction Status Date / Time


 


cefuroxime Allergy  Rash/Hives Verified 12/06/23 14:29


 


naproxen [From Naprosyn] Allergy  Dyspnea Verified 10/31/23 11:10


 


blood thinner Allergy  "brain Uncoded 10/31/23 11:10





   bleed"  














Review of Systems


ROS Statement: 


Those systems with pertinent positive or pertinent negative responses have been 

documented in the HPI.





ROS Other: All systems not noted in ROS Statement are negative.





Past Medical History


Past Medical History: Asthma, Cancer, COPD, Diabetes Mellitus, Dialysis, 

Hyperlipidemia, Hypertension, Myocardial Infarction (MI), Osteoarthritis (OA), 

Pneumonia, Renal Disease, Thyroid Disorder


Additional Past Medical History / Comment(s): Hx Uterine cancer 25 yrs ago, 

hemodialysis Mon, Wed and Fri, varicose veins., "mild heart attack"" "brain 

bleed twice" chronic diarrhea, hx "14 polyps", "chronic back pain", anemia, 

current edema rufina feet, diet control diabetic


Last Myocardial Infarction Date:: unknown


History of Any Multi-Drug Resistant Organisms: None Reported


Past Surgical History: Appendectomy, Bariatric Surgery, Cholecystectomy, 

Hysterectomy, Joint Replacement, Tonsillectomy


Additional Past Surgical History / Comment(s): gastric bypass, surgery after 

fall -fx rt leg and foot, rufina knee replacement, rufina cataracts, surgery x 2 or 

"brain bleed", fistula for dialysis, COLONOSCOPY,


Past Anesthesia/Blood Transfusion Reactions: Family History of Problems w/ Anest

hesia


Additional Past Anesthesia/Blood Transfusion Reaction / Comment(s): MOTHER PONV


Past Psychological History: No Psychological Hx Reported


Smoking Status: Former smoker





- Past Family History


  ** Mother


Family Medical History: Cancer





  ** Father


Family Medical History: Diabetes Mellitus, Hypertension





  ** Sister(s)


Family Medical History: Cancer





General Exam


Limitations: no limitations


General appearance: alert, in no apparent distress


Neck exam: Present: normal inspection


Respiratory exam: Present: normal lung sounds bilaterally


Cardiovascular Exam: Present: regular rate, normal rhythm


GI/Abdominal exam: Present: soft


Extremities exam: Present: other (2+ pitting edema bilaterally. Some erythema of

bilateral lower legs, non-circumfrential, non-tender, not significantly warm. )


Neurological exam: Present: alert, oriented X3





Course


                                   Vital Signs











  12/06/23 12/06/23 12/06/23





  14:26 15:03 16:07


 


Temperature 97.9 F  


 


Pulse Rate 69 63 73


 


Respiratory 18 23 18





Rate   


 


Blood Pressure 77/46 91/56 110/45


 


O2 Sat by Pulse 98 94 L 96





Oximetry   














Medical Decision Making





- Medical Decision Making


Was pt. sent in by a medical professional or institution (, PA, NP, urgent 

care, hospital, or nursing home...) When possible be specific


@  -No


Did you speak to anyone other than the patient for history (EMS, parent, family,

police, friend...)? What history was obtained from this source 


@  -No


Did you review nursing and triage notes (agree or disagree)?  Why? 


@  -I reviewed and agree with nursing and triage notes


Were old charts reviewed (outside hosp., previous admission, EMS record, old 

EKG, old radiological studies, urgent care reports/EKG's, nursing home records)?

Report findings 


@  -No old charts were reviewed


Differential Diagnosis (chest pain, altered mental status, abdominal pain women,

abdominal pain men, vaginal bleeding, weakness, fever, dyspnea, syncope, 

headache, dizziness, GI bleed, back pain, seizure, CVA, palpatations, mental 

health, musculoskeletal)? 


@  -Differential Weakness:


Hypoglycemia, shock, sepsis, hyponatremia, anemia, infection, MI, ETOH, adverse 

medicine reaction, overdose, stroke, this is not meant to be an all-inclusive 

list.


EKG interpreted by me (3pts min.).


@  -As above


X-rays interpreted by me (1pt min.).


@  -Chest x-ray interpreted by Me showing no evidence of pneumonia or other 

acute finding.


CT interpreted by me (1pt min.).


@  -None done


U/S interpreted by me (1pt. min.).


@  -None done


What testing was considered but not performed or refused? (CT, X-rays, U/S, 

labs)? Why?


@  -None


What meds were considered but not given or refused? Why?


@  -None


Did you discuss the management of the patient with other professionals 

(professionals i.e. , PA, NP, lab, RT, psych nurse, , , 

teacher, , )? Give summary


@  -Case discussed with [facility] who accepts admission 


Was smoking cessation discussed for >3mins.?


@  -No


Was critical care preformed (if so, how long)?


@  -No


Were there social determinants of health that impacted care today? How? 

(Homelessness, low income, unemployed, alcoholism, drug addiction, 

transportation, low edu. Level, literacy, decrease access to med. care, alf, 

rehab)?


@  -No


Was there de-escalation of care discussed even if they declined (Discuss DNR or 

withdrawal of care, Hospice)? DNR status


@  -No


What co-morbidities impacted this encounter? (DM, HTN, Smoking, COPD, CAD, 

Cancer, CVA, ARF, Chemo, Hep., AIDS, mental health diagnosis, sleep apnea, 

morbid obesity)?


@  -ESRD


Was patient admitted / discharged? Hospital course, mention meds given and 

route, prescriptions, significant lab abnormalities, going to OR and other p

ertinent info.


@  -Admission





78-year-old female presenting to the ED with complaints of upper respiratory 

symptoms, generalized weakness, nausea vomiting diarrhea for the past week and a

half. Laboratory studies reviewed. CBC demonstrates anemia at 9.8.  Chemistry 

panel demonstrates hypokalemia at 3.  He went and creatinine elevated at 27 and 

3.78 however improved from last.  Troponin 0.015.  BNP 4360.  Serology panel 

shows patient positive for COVID.  Going home as patient reports that she is so 

weak she barely made it to dialysis today.  Therefore, patient will be admitted 

to observation with consult to nephrology.


Undiagnosed new problem with uncertain prognosis?


@  -No


Drug Therapy requiring intensive monitoring for toxicity (Heparin, Nitro, 

Insulin, Cardizem)?


@  -No


Were any procedures done?


@  -No


Diagnosis/symptom?


@  -COVID, generalized weakness


Acute, or Chronic, or Acute on Chronic?


@  -Acute


Uncomplicated (without systemic symptoms) or Complicated (systemic symptoms)?


@  -Uncomplicated


Side effects of treatment?


@  -No


Exacerbation, Progression, or Severe Exacerbation?


@  -No


Poses a threat to life or bodily function? How? (Chest pain, USA, MI, pneumonia,

PE, COPD, DKA, ARF, appy, cholecystitis, CVA, Diverticulitis, Homicidal, Suicida

l, threat to staff... and all critical care pts)


@  -No








- Lab Data


Result diagrams: 


                                 12/06/23 15:11





                                 12/06/23 15:11


                                   Lab Results











  12/06/23 12/06/23 12/06/23 Range/Units





  15:11 15:11 15:11 


 


WBC  4.2    (3.8-10.6)  k/uL


 


RBC  2.95 L    (3.80-5.40)  m/uL


 


Hgb  9.8 L    (11.4-16.0)  gm/dL


 


Hct  29.6 L    (34.0-46.0)  %


 


MCV  100.4 H    (80.0-100.0)  fL


 


MCH  33.3    (25.0-35.0)  pg


 


MCHC  33.2    (31.0-37.0)  g/dL


 


RDW  14.2    (11.5-15.5)  %


 


Plt Count  194    (150-450)  k/uL


 


MPV  8.2    


 


Neutrophils %  62    %


 


Lymphocytes %  25    %


 


Monocytes %  7    %


 


Eosinophils %  3    %


 


Basophils %  0    %


 


Neutrophils #  2.6    (1.3-7.7)  k/uL


 


Lymphocytes #  1.0    (1.0-4.8)  k/uL


 


Monocytes #  0.3    (0-1.0)  k/uL


 


Eosinophils #  0.1    (0-0.7)  k/uL


 


Basophils #  0.0    (0-0.2)  k/uL


 


Macrocytosis  Slight    


 


PT   9.8 L   (10.0-12.5)  sec


 


INR   0.9   (<1.2)  


 


APTT   22.3   (22.0-30.0)  sec


 


Sodium    135 L  (137-145)  mmol/L


 


Potassium    3.0 L  (3.5-5.1)  mmol/L


 


Chloride    94 L  ()  mmol/L


 


Carbon Dioxide    26  (22-30)  mmol/L


 


Anion Gap    15  mmol/L


 


BUN    27 H  (7-17)  mg/dL


 


Creatinine    3.78 H  (0.52-1.04)  mg/dL


 


Est GFR (CKD-EPI)AfAm    13  (>60 ml/min/1.73 sqM)  


 


Est GFR (CKD-EPI)NonAf    11  (>60 ml/min/1.73 sqM)  


 


Glucose    165 H  (74-99)  mg/dL


 


Calcium    8.5  (8.4-10.2)  mg/dL


 


Total Bilirubin    0.4  (0.2-1.3)  mg/dL


 


AST    38 H  (14-36)  U/L


 


ALT    26  (4-34)  U/L


 


Alkaline Phosphatase    194 H  ()  U/L


 


Troponin I     (0.000-0.034)  ng/mL


 


NT-Pro-B Natriuret Pep    4360  pg/mL


 


Total Protein    6.3  (6.3-8.2)  g/dL


 


Albumin    3.5  (3.5-5.0)  g/dL


 


Influenza Type A (PCR)     (Not Detectd)  


 


Influenza Type B (PCR)     (Not Detectd)  


 


RSV (PCR)     (Not Detectd)  


 


SARS-CoV-2 (PCR)     (Not Detectd)  














  12/06/23 12/06/23 Range/Units





  15:11 15:14 


 


WBC    (3.8-10.6)  k/uL


 


RBC    (3.80-5.40)  m/uL


 


Hgb    (11.4-16.0)  gm/dL


 


Hct    (34.0-46.0)  %


 


MCV    (80.0-100.0)  fL


 


MCH    (25.0-35.0)  pg


 


MCHC    (31.0-37.0)  g/dL


 


RDW    (11.5-15.5)  %


 


Plt Count    (150-450)  k/uL


 


MPV    


 


Neutrophils %    %


 


Lymphocytes %    %


 


Monocytes %    %


 


Eosinophils %    %


 


Basophils %    %


 


Neutrophils #    (1.3-7.7)  k/uL


 


Lymphocytes #    (1.0-4.8)  k/uL


 


Monocytes #    (0-1.0)  k/uL


 


Eosinophils #    (0-0.7)  k/uL


 


Basophils #    (0-0.2)  k/uL


 


Macrocytosis    


 


PT    (10.0-12.5)  sec


 


INR    (<1.2)  


 


APTT    (22.0-30.0)  sec


 


Sodium    (137-145)  mmol/L


 


Potassium    (3.5-5.1)  mmol/L


 


Chloride    ()  mmol/L


 


Carbon Dioxide    (22-30)  mmol/L


 


Anion Gap    mmol/L


 


BUN    (7-17)  mg/dL


 


Creatinine    (0.52-1.04)  mg/dL


 


Est GFR (CKD-EPI)AfAm    (>60 ml/min/1.73 sqM)  


 


Est GFR (CKD-EPI)NonAf    (>60 ml/min/1.73 sqM)  


 


Glucose    (74-99)  mg/dL


 


Calcium    (8.4-10.2)  mg/dL


 


Total Bilirubin    (0.2-1.3)  mg/dL


 


AST    (14-36)  U/L


 


ALT    (4-34)  U/L


 


Alkaline Phosphatase    ()  U/L


 


Troponin I  0.015   (0.000-0.034)  ng/mL


 


NT-Pro-B Natriuret Pep    pg/mL


 


Total Protein    (6.3-8.2)  g/dL


 


Albumin    (3.5-5.0)  g/dL


 


Influenza Type A (PCR)   Not Detected  (Not Detectd)  


 


Influenza Type B (PCR)   Not Detected  (Not Detectd)  


 


RSV (PCR)   Not Detected  (Not Detectd)  


 


SARS-CoV-2 (PCR)   Detected A  (Not Detectd)  














- EKG Data


EKG Comments: 


EKG shows a sinus rhythm at 59 beats for minute with nonspecific changes.  QRS 

97, QT/QTc 422/421.








Disposition


Clinical Impression: 


 COVID, Generalized weakness





Disposition: ADMITTED AS IP TO THIS HOSP


Condition: Good


Referrals: 


Esau Ashraf DO [Primary Care Provider] - 1-2 days


Time of Disposition: 16:45

## 2023-12-07 LAB
ANION GAP SERPL CALC-SCNC: 14 MMOL/L
BASOPHILS # BLD AUTO: 0 K/UL (ref 0–0.2)
BASOPHILS NFR BLD AUTO: 0 %
BILIRUB UR QL STRIP.AUTO: (no result)
BUN SERPL-SCNC: 35 MG/DL (ref 7–17)
CALCIUM SPEC-MCNC: 8.6 MG/DL (ref 8.4–10.2)
CHLORIDE SERPL-SCNC: 94 MMOL/L (ref 98–107)
CO2 SERPL-SCNC: 26 MMOL/L (ref 22–30)
EOSINOPHIL # BLD AUTO: 0.2 K/UL (ref 0–0.7)
EOSINOPHIL NFR BLD AUTO: 4 %
ERYTHROCYTE [DISTWIDTH] IN BLOOD BY AUTOMATED COUNT: 3.05 M/UL (ref 3.8–5.4)
ERYTHROCYTE [DISTWIDTH] IN BLOOD: 14.2 % (ref 11.5–15.5)
GLUCOSE SERPL-MCNC: 133 MG/DL (ref 74–99)
HCT VFR BLD AUTO: 31.3 % (ref 34–46)
HGB BLD-MCNC: 10.3 GM/DL (ref 11.4–16)
KETONES UR QL STRIP.AUTO: (no result)
LYMPHOCYTES # SPEC AUTO: 1 K/UL (ref 1–4.8)
LYMPHOCYTES NFR SPEC AUTO: 22 %
MAGNESIUM SPEC-SCNC: 1.9 MG/DL (ref 1.6–2.3)
MCH RBC QN AUTO: 33.8 PG (ref 25–35)
MCHC RBC AUTO-ENTMCNC: 32.8 G/DL (ref 31–37)
MCV RBC AUTO: 102.9 FL (ref 80–100)
MONOCYTES # BLD AUTO: 0.3 K/UL (ref 0–1)
MONOCYTES NFR BLD AUTO: 6 %
NEUTROPHILS # BLD AUTO: 3 K/UL (ref 1.3–7.7)
NEUTROPHILS NFR BLD AUTO: 65 %
PH UR: 6 [PH] (ref 5–8)
PLATELET # BLD AUTO: 202 K/UL (ref 150–450)
POTASSIUM SERPL-SCNC: 3.8 MMOL/L (ref 3.5–5.1)
PROT UR QL: (no result)
RBC UR QL: 12 /HPF (ref 0–5)
SODIUM SERPL-SCNC: 134 MMOL/L (ref 137–145)
SP GR UR: 1.02 (ref 1–1.03)
SQUAMOUS UR QL AUTO: 33 /HPF (ref 0–4)
UROBILINOGEN UR QL STRIP: <2 MG/DL (ref ?–2)
WBC # BLD AUTO: 4.7 K/UL (ref 3.8–10.6)
WBC #/AREA URNS HPF: 112 /HPF (ref 0–5)

## 2023-12-07 RX ADMIN — OXYCODONE HYDROCHLORIDE AND ACETAMINOPHEN SCH MG: 500 TABLET ORAL at 08:25

## 2023-12-07 RX ADMIN — HEPARIN SODIUM SCH UNIT: 5000 INJECTION, SOLUTION INTRAVENOUS; SUBCUTANEOUS at 18:01

## 2023-12-07 RX ADMIN — ONDANSETRON PRN MG: 2 INJECTION INTRAMUSCULAR; INTRAVENOUS at 20:16

## 2023-12-07 RX ADMIN — Medication SCH MG: at 11:55

## 2023-12-07 RX ADMIN — METOPROLOL TARTRATE SCH MG: 50 TABLET, FILM COATED ORAL at 20:15

## 2023-12-07 RX ADMIN — LEVOTHYROXINE SODIUM SCH MCG: 0.11 TABLET ORAL at 08:25

## 2023-12-07 RX ADMIN — OXYCODONE HYDROCHLORIDE AND ACETAMINOPHEN PRN EACH: 10; 325 TABLET ORAL at 08:38

## 2023-12-07 RX ADMIN — Medication SCH MG: at 08:25

## 2023-12-07 RX ADMIN — METOPROLOL TARTRATE SCH MG: 50 TABLET, FILM COATED ORAL at 08:26

## 2023-12-07 RX ADMIN — HYDROMORPHONE HYDROCHLORIDE PRN MG: 1 INJECTION, SOLUTION INTRAMUSCULAR; INTRAVENOUS; SUBCUTANEOUS at 04:18

## 2023-12-07 RX ADMIN — ONDANSETRON PRN MG: 2 INJECTION INTRAMUSCULAR; INTRAVENOUS at 12:18

## 2023-12-07 RX ADMIN — Medication SCH: at 18:01

## 2023-12-07 RX ADMIN — ATORVASTATIN CALCIUM SCH MG: 20 TABLET, FILM COATED ORAL at 20:15

## 2023-12-07 RX ADMIN — OXYCODONE HYDROCHLORIDE AND ACETAMINOPHEN PRN EACH: 10; 325 TABLET ORAL at 23:15

## 2023-12-07 RX ADMIN — OXYCODONE HYDROCHLORIDE AND ACETAMINOPHEN PRN EACH: 10; 325 TABLET ORAL at 18:02

## 2023-12-07 RX ADMIN — HEPARIN SODIUM SCH UNIT: 5000 INJECTION, SOLUTION INTRAVENOUS; SUBCUTANEOUS at 23:15

## 2023-12-07 RX ADMIN — Medication SCH EACH: at 09:43

## 2023-12-07 NOTE — P.HPIM
History of Present Illness


H&P Date: 12/07/23


Chief Complaint: Fatigue, weakness





* 78-year-old patient with past medical history significant for end-stage renal 

  disease on hemodialysis, history of anemia from chronic kidney disease, 

  hyperlipidemia, hypertension, osteoarthritis, obesity, presents to the 

  emergency department with complains of generalized weakness, fatigue.  Patient

  states she has not been feeling well for the last 1 week with generalized 

  weakness, cough, congestion, nausea and diarrhea.  Patient stated that she had

  severe limitation with activity of daily living secondary to profound 

  weakness.


* Workup initiated in ER including serum chemistry which showed sodium of 135 

  potassium of 3 chloride of 94 BU and 27 creatinine 3.78 lactate of 4.5 AST 38 

  AST 26 troponin within normal limits


* CBC obtained showed WBC 4.2 hemoglobin 9.8 hematocrit 29 platelet count 194


* Initial lactate obtained 4.5 follow-up lactate 2.2 and 1.6


* While in ER patient was given 2 L of fluid bolus followed by potassium suppl

  ementation, patient was admitted to medical floor for management of profound 

  weakness from Covid 19 with consultations from nephrology for maintenance of 

  hemodialysis


* Patient states she went to an urgent care a few days ago and was given 

  cefuroxime for lower extremity cellulitis, on evaluation patient does have 

  recent dermatitis vasculitis noted patient had itching and rash from sulfa 

  drugs ALLERGY list updated








REVIEW OF SYSTEMS: Generalized weakness, nausea, diarrhea, fatigue, cough


CONSTITUTIONAL: No fever, no malaise, no fatigue. 


HEENT: No recent visual problems or hearing problems. Denied any sore throat. 


CARDIOVASCULAR: No chest pain, orthopnea, PND, no palpitations, no syncope. 


PULMONARY: Generalized weakness, nausea, diarrhea, fatigue, cough


GASTROINTESTINAL: Generalized weakness, nausea, diarrhea, fatigue, cough


NEUROLOGICAL: No headaches, no weakness, no numbness. 


HEMATOLOGICAL: Denies any bleeding or petechiae. 


GENITOURINARY: Denies any burning micturition, frequency, or urgency. 


MUSCULOSKELETAL/RHEUMATOLOGICAL: Denies any joint pain, swelling, or any muscle 

pain. 


ENDOCRINE: Denies any polyuria or polydipsia. 





The rest of the 14-point review of systems is negative.











PHYSICAL EXAMINATION: 





GENERAL: The patient is alert and oriented x3, ill appearance


HEENT: Pupils are round and equally reacting to light. EOMI.


CARDIOVASCULAR: S1 and S2 present. No murmurs, rubs, or gallops. 


PULMONARY: Chest is clear to auscultation, no wheezing or crackles. 


ABDOMEN: Soft, nontender, nondistended, normoactive bowel sounds. No palpable 

organomegaly. 


MUSCULOSKELETAL: No joint swelling or deformity.


EXTREMITIES: No cyanosis, clubbing, or pedal edema. 


NEUROLOGICAL: Gross neurological examination did not reveal any focal deficits. 


SKIN: Skin changes bilateral lower extremity from edema 





Past Medical History


Past Medical History: Asthma, Cancer, COPD, Dialysis, Hyperlipidemia, 

Hypertension, Myocardial Infarction (MI), Osteoarthritis (OA), Pneumonia, Renal 

Disease, Thyroid Disorder


Additional Past Medical History / Comment(s): Hx Uterine cancer 25 yrs ago, 

hemodialysis Mon, Wed and Fri, varicose veins., "mild heart attack"" "brain 

bleed twice" chronic diarrhea, hx "14 polyps", "chronic back pain", anemia, 

current edema rufina feet, diet control diabetic


Last Myocardial Infarction Date:: unknown


History of Any Multi-Drug Resistant Organisms: None Reported


Past Surgical History: Appendectomy, Bariatric Surgery, Cholecystectomy, 

Hysterectomy, Joint Replacement, Tonsillectomy


Additional Past Surgical History / Comment(s): gastric bypass, surgery after 

fall -fx rt leg and foot, rufina knee replacement, rufina cataracts, surgery x 2 or 

"brain bleed", fistula for dialysis, COLONOSCOPY,


Past Anesthesia/Blood Transfusion Reactions: Family History of Problems w/ 

Anesthesia


Additional Past Anesthesia/Blood Transfusion Reaction / Comment(s): MOTHER PONV


Past Psychological History: No Psychological Hx Reported


Smoking Status: Former smoker


Past Alcohol Use History: None Reported


Additional Past Alcohol Use History / Comment(s): Quit smoking 30+ yrs ago.


Past Drug Use History: None Reported





- Past Family History


  ** Mother


Family Medical History: Cancer





  ** Father


Family Medical History: Diabetes Mellitus, Hypertension





  ** Sister(s)


Family Medical History: Cancer





Medications and Allergies


                                Home Medications











 Medication  Instructions  Recorded  Confirmed  Type


 


Furosemide [Lasix] 20 mg PO BID 02/04/20 12/06/23 History


 


Levothyroxine Sodium [Synthroid] 112 mcg PO AC-BRKFST 02/04/20 12/06/23 History


 


Rosuvastatin Calcium [Crestor] 10 mg PO HS 02/04/20 12/06/23 History


 


Calcium Acetate 1,334 mg PO AC-TID 11/09/20 12/06/23 History


 


Metoprolol Tartrate [Lopressor] 50 mg PO BID 11/09/20 12/06/23 History


 


amLODIPine [Norvasc] 10 mg PO DAILY 06/21/21 12/06/23 History


 


oxyCODONE-APAP 10-325MG [Percocet 1 tab PO TID PRN 01/07/23 12/06/23 History





 mg]    


 


Albuterol Inhaler [Ventolin Hfa 2 puff INHALATION RT-QID PRN 05/04/23 12/06/23 

History





Inhaler]    


 


Albuterol Nebulized [Ventolin 2.5 mg INHALATION RT-QID PRN 05/04/23 12/06/23 

History





Nebulized]    


 


Vit B Comp No.3/Folic/C/Biotin 1 tab PO DAILY 10/31/23 12/06/23 History





[Bella-Hyacinth Rx Tablet]    


 


Budesonide/Formoterol Fumarate 2 puff INHALATION RT-BID PRN 12/06/23 12/06/23 

History





[Symbicort 160-4.5 Mcg Inhaler]    


 


Ipratropium Nebulized [Atrovent 0.5 mg INHALATION RT-TID PRN 12/06/23 12/06/23 

History





Nebulized 0.2 MG/ML]    


 


hydrALAZINE HCL [Apresoline] 25 mg PO BID 12/06/23 12/06/23 History


 


lisinopriL 40 mg PO DAILY 12/06/23 12/06/23 History








                                    Allergies











Allergy/AdvReac Type Severity Reaction Status Date / Time


 


cefuroxime Allergy  Rash/Hives Verified 12/06/23 17:35


 


naproxen [From Naprosyn] Allergy  Dyspnea Verified 12/06/23 17:35


 


blood thinner Allergy  "brain Uncoded 12/06/23 17:35





   bleed"  














Physical Exam


Vitals: 


                                   Vital Signs











  Temp Pulse Pulse Resp BP BP Pulse Ox


 


 12/07/23 07:34  97.5 F L   70  19   109/67  98


 


 12/07/23 04:17    71  18   116/61  97


 


 12/06/23 22:50  97.5 F L   61    100/58  97


 


 12/06/23 21:51   74   18  108/51   97


 


 12/06/23 19:25   68   18  102/40   95


 


 12/06/23 16:07   73   18  110/45   96


 


 12/06/23 15:03   63   23  91/56   94 L


 


 12/06/23 14:26  97.9 F  69   18  77/46   98








                                Intake and Output











 12/06/23 12/07/23 12/07/23





 22:59 06:59 14:59


 


Other:   


 


  # Voids  3 


 


  Weight 102.058 kg  














Results


CBC & Chem 7: 


                                 12/07/23 08:14





                                 12/07/23 08:14


Labs: 


                  Abnormal Lab Results - Last 24 Hours (Table)











  12/06/23 12/06/23 12/06/23 Range/Units





  15:11 15:11 15:11 


 


RBC  2.95 L    (3.80-5.40)  m/uL


 


Hgb  9.8 L    (11.4-16.0)  gm/dL


 


Hct  29.6 L    (34.0-46.0)  %


 


MCV  100.4 H    (80.0-100.0)  fL


 


PT   9.8 L   (10.0-12.5)  sec


 


Sodium    135 L  (137-145)  mmol/L


 


Potassium    3.0 L  (3.5-5.1)  mmol/L


 


Chloride    94 L  ()  mmol/L


 


BUN    27 H  (7-17)  mg/dL


 


Creatinine    3.78 H  (0.52-1.04)  mg/dL


 


Glucose    165 H  (74-99)  mg/dL


 


Plasma Lactic Acid Tomy     (0.7-2.0)  mmol/L


 


AST    38 H  (14-36)  U/L


 


Alkaline Phosphatase    194 H  ()  U/L


 


SARS-CoV-2 (PCR)     (Not Detectd)  














  12/06/23 12/06/23 12/06/23 Range/Units





  15:11 15:14 19:51 


 


RBC     (3.80-5.40)  m/uL


 


Hgb     (11.4-16.0)  gm/dL


 


Hct     (34.0-46.0)  %


 


MCV     (80.0-100.0)  fL


 


PT     (10.0-12.5)  sec


 


Sodium     (137-145)  mmol/L


 


Potassium     (3.5-5.1)  mmol/L


 


Chloride     ()  mmol/L


 


BUN     (7-17)  mg/dL


 


Creatinine     (0.52-1.04)  mg/dL


 


Glucose     (74-99)  mg/dL


 


Plasma Lactic Acid Tomy  4.5 H*   3.2 H*  (0.7-2.0)  mmol/L


 


AST     (14-36)  U/L


 


Alkaline Phosphatase     ()  U/L


 


SARS-CoV-2 (PCR)   Detected A   (Not Detectd)  














  12/07/23 12/07/23 Range/Units





  08:14 08:14 


 


RBC  3.05 L   (3.80-5.40)  m/uL


 


Hgb  10.3 L   (11.4-16.0)  gm/dL


 


Hct  31.3 L   (34.0-46.0)  %


 


MCV  102.9 H   (80.0-100.0)  fL


 


PT    (10.0-12.5)  sec


 


Sodium   134 L  (137-145)  mmol/L


 


Potassium    (3.5-5.1)  mmol/L


 


Chloride   94 L  ()  mmol/L


 


BUN   35 H  (7-17)  mg/dL


 


Creatinine   4.61 H  (0.52-1.04)  mg/dL


 


Glucose   133 H  (74-99)  mg/dL


 


Plasma Lactic Acid Tomy    (0.7-2.0)  mmol/L


 


AST    (14-36)  U/L


 


Alkaline Phosphatase    ()  U/L


 


SARS-CoV-2 (PCR)    (Not Detectd)  














Thrombosis Risk Factor Assmnt





- Choose All That Apply


Any of the Below Risk Factors Present?: Yes


Each Factor Represents 1 point: Abnormal pulmonary function (COPD), Obesity (BMI

>25)


Each Risk Factor Represents 3 Points: Age 75 years or older


Thrombosis Risk Factor Assessment Total Risk Factor Score: 5


Thrombosis Risk Factor Assessment Level: High Risk





Assessment and Plan


Assessment: 








Assessment and plan





* Covid 19 infection with generalized fatigue and malaise/diarrhea


* History of COPD 


* End-stage renal disease on hemodialysis


* Anemia of chronic kidney disease


* Lactic acidosis on admission secondary to dehydration


* Hypokalemia on admission


* History of hypertension








* In regards to viral pneumonia from Covid 19, continue to follow-up on 

  inflammation markers, continue to monitor for desaturation, will use 

  dexamethasone if needed continue breathing treatments on Symbicort and 

  albuterol


* In regards to history of COPD continue patient on albuterol and Symbicort, 

  monitor for desaturation


* In regards to end-stage renal disease on hemodialysis nephrology consulted


* In regards to lactic acidosis likely secondary to volume depletion, he should 

  resuscitated with fluid, Lasix placed on hold, nephrology to monitor/manage 

  fluids with dialysis


* In regards to hyperkalemia patient given potassium supplementation


* In regards to history of hypertension continue hydralazine, metoprolol, 

  lisinopril


* CODE STATUS is okay with CPR and no intubation


Time with Patient: Greater than 30

## 2023-12-07 NOTE — P.CONS
History of Present Illness





- Reason for Consult


Consult date: 12/07/23





- History of Present Illness


Patient is a 78-year-old  female with a past medical history 

significant for COPD hypertension hyperlipidemia MI osteoarthritis patient 

presenting to the hospital yesterday afternoon for evaluation of generalized 

fatigue patient symptom has been getting worse for the week before presentation 

to the hospital patient also have congestion cough nausea and diarrhea with 

worsening symptoms and the patient did have difficulty getting around patient 

presented to the hospital patient denies high-grade fever did have some chills 

denies any headache mild URI symptoms and also some chest congestion mild cough 

which is dry in nature denies any pleuritic chest pain nausea no vomiting no abd

ominal pain with the symptoms patient has been evaluated on presentation to 

hospital patient was afebrile no fever have recorded subsequently patient was 

not hypoxic or need for supplemental oxygen patient did have white count of 4.2 

BUN and creatinine has been elevated lactic acid was elevated liver enzymes are 

normal urine positive patient did tested positive for COVID did have a chest x-

ray no acute process patient was admitted to hospital infectious he was 

consulted for further management








Past Medical History


Past Medical History: Asthma, Cancer, COPD, Dialysis, Hyperlipidemia, 

Hypertension, Myocardial Infarction (MI), Osteoarthritis (OA), Pneumonia, Renal 

Disease, Thyroid Disorder


Additional Past Medical History / Comment(s): Hx Uterine cancer 25 yrs ago, 

hemodialysis Mon, Wed and Fri, varicose veins., "mild heart attack"" "brain 

bleed twice" chronic diarrhea, hx "14 polyps", "chronic back pain", anemia, 

current edema rufina feet, diet control diabetic


Last Myocardial Infarction Date:: unknown


History of Any Multi-Drug Resistant Organisms: None Reported


Past Surgical History: Appendectomy, Bariatric Surgery, Cholecystectomy, 

Hysterectomy, Joint Replacement, Tonsillectomy


Additional Past Surgical History / Comment(s): gastric bypass, surgery after 

fall -fx rt leg and foot, rufina knee replacement, rufina cataracts, surgery x 2 or 

"brain bleed", fistula for dialysis, COLONOSCOPY,


Past Anesthesia/Blood Transfusion Reactions: Family History of Problems w/ 

Anesthesia


Additional Past Anesthesia/Blood Transfusion Reaction / Comm: MOTHER PONV


Past Psychological History: No Psychological Hx Reported


Smoking Status: Former smoker


Past Alcohol Use History: None Reported


Additional Past Alcohol Use History / Comment(s): Quit smoking 30+ yrs ago.


Past Drug Use History: None Reported





- Past Family History


  ** Mother


Family Medical History: Cancer





  ** Father


Family Medical History: Diabetes Mellitus, Hypertension





  ** Sister(s)


Family Medical History: Cancer





Medications and Allergies


                                Home Medications











 Medication  Instructions  Recorded  Confirmed  Type


 


Furosemide [Lasix] 20 mg PO BID 02/04/20 12/06/23 History


 


Levothyroxine Sodium [Synthroid] 112 mcg PO AC-BRKFST 02/04/20 12/06/23 History


 


Rosuvastatin Calcium [Crestor] 10 mg PO HS 02/04/20 12/06/23 History


 


Calcium Acetate 1,334 mg PO AC-TID 11/09/20 12/06/23 History


 


Metoprolol Tartrate [Lopressor] 50 mg PO BID 11/09/20 12/06/23 History


 


amLODIPine [Norvasc] 10 mg PO DAILY 06/21/21 12/06/23 History


 


oxyCODONE-APAP 10-325MG [Percocet 1 tab PO TID PRN 01/07/23 12/06/23 History





 mg]    


 


Albuterol Inhaler [Ventolin Hfa 2 puff INHALATION RT-QID PRN 05/04/23 12/06/23 

History





Inhaler]    


 


Albuterol Nebulized [Ventolin 2.5 mg INHALATION RT-QID PRN 05/04/23 12/06/23 

History





Nebulized]    


 


Vit B Comp No.3/Folic/C/Biotin 1 tab PO DAILY 10/31/23 12/06/23 History





[Bella-Hyacinth Rx Tablet]    


 


Budesonide/Formoterol Fumarate 2 puff INHALATION RT-BID PRN 12/06/23 12/06/23 

History





[Symbicort 160-4.5 Mcg Inhaler]    


 


Ipratropium Nebulized [Atrovent 0.5 mg INHALATION RT-TID PRN 12/06/23 12/06/23 

History





Nebulized 0.2 MG/ML]    


 


hydrALAZINE HCL [Apresoline] 25 mg PO BID 12/06/23 12/06/23 History


 


lisinopriL 40 mg PO DAILY 12/06/23 12/06/23 History








                                    Allergies











Allergy/AdvReac Type Severity Reaction Status Date / Time


 


cefuroxime Allergy  Rash/Hives Verified 12/06/23 17:35


 


naproxen [From Naprosyn] Allergy  Dyspnea Verified 12/06/23 17:35


 


blood thinner Allergy  "brain Uncoded 12/06/23 17:35





   bleed"  














Physical Exam


Vitals: 


                                   Vital Signs











  Temp Pulse Pulse Resp BP BP Pulse Ox


 


 12/07/23 07:34  97.5 F L   70  19   109/67  98


 


 12/07/23 04:17    71  18   116/61  97


 


 12/06/23 22:50  97.5 F L   61    100/58  97


 


 12/06/23 21:51   74   18  108/51   97


 


 12/06/23 19:25   68   18  102/40   95


 


 12/06/23 16:07   73   18  110/45   96


 


 12/06/23 15:03   63   23  91/56   94 L


 


 12/06/23 14:26  97.9 F  69   18  77/46   98








                                Intake and Output











 12/06/23 12/07/23 12/07/23





 22:59 06:59 14:59


 


Other:   


 


  # Voids  3 


 


  Weight 102.058 kg  














Results


CBC & Chem 7: 


                                 12/07/23 08:14





                                 12/07/23 08:14


Labs: 


                  Abnormal Lab Results - Last 24 Hours (Table)











  12/06/23 12/06/23 12/06/23 Range/Units





  15:11 15:11 15:11 


 


RBC  2.95 L    (3.80-5.40)  m/uL


 


Hgb  9.8 L    (11.4-16.0)  gm/dL


 


Hct  29.6 L    (34.0-46.0)  %


 


MCV  100.4 H    (80.0-100.0)  fL


 


PT   9.8 L   (10.0-12.5)  sec


 


Sodium    135 L  (137-145)  mmol/L


 


Potassium    3.0 L  (3.5-5.1)  mmol/L


 


Chloride    94 L  ()  mmol/L


 


BUN    27 H  (7-17)  mg/dL


 


Creatinine    3.78 H  (0.52-1.04)  mg/dL


 


Glucose    165 H  (74-99)  mg/dL


 


Plasma Lactic Acid Tomy     (0.7-2.0)  mmol/L


 


AST    38 H  (14-36)  U/L


 


Alkaline Phosphatase    194 H  ()  U/L


 


SARS-CoV-2 (PCR)     (Not Detectd)  














  12/06/23 12/06/23 12/06/23 Range/Units





  15:11 15:14 19:51 


 


RBC     (3.80-5.40)  m/uL


 


Hgb     (11.4-16.0)  gm/dL


 


Hct     (34.0-46.0)  %


 


MCV     (80.0-100.0)  fL


 


PT     (10.0-12.5)  sec


 


Sodium     (137-145)  mmol/L


 


Potassium     (3.5-5.1)  mmol/L


 


Chloride     ()  mmol/L


 


BUN     (7-17)  mg/dL


 


Creatinine     (0.52-1.04)  mg/dL


 


Glucose     (74-99)  mg/dL


 


Plasma Lactic Acid Tomy  4.5 H*   3.2 H*  (0.7-2.0)  mmol/L


 


AST     (14-36)  U/L


 


Alkaline Phosphatase     ()  U/L


 


SARS-CoV-2 (PCR)   Detected A   (Not Detectd)  














  12/07/23 12/07/23 Range/Units





  08:14 08:14 


 


RBC  3.05 L   (3.80-5.40)  m/uL


 


Hgb  10.3 L   (11.4-16.0)  gm/dL


 


Hct  31.3 L   (34.0-46.0)  %


 


MCV  102.9 H   (80.0-100.0)  fL


 


PT    (10.0-12.5)  sec


 


Sodium   134 L  (137-145)  mmol/L


 


Potassium    (3.5-5.1)  mmol/L


 


Chloride   94 L  ()  mmol/L


 


BUN   35 H  (7-17)  mg/dL


 


Creatinine   4.61 H  (0.52-1.04)  mg/dL


 


Glucose   133 H  (74-99)  mg/dL


 


Plasma Lactic Acid Tomy    (0.7-2.0)  mmol/L


 


AST    (14-36)  U/L


 


Alkaline Phosphatase    ()  U/L


 


SARS-CoV-2 (PCR)    (Not Detectd)  














Assessment and Plan


Plan: 


1patient presented to hospital with generalized weakness no energy did have 

mild respiratory symptoms and has been diagnosed with acute COVID-19 infection 

however no evidence of pneumonia on the chest x-ray patient not hypoxic or need 

for supplemental oxygen treatment will be mostly supportive


2-patient to continue heparin zinc ascorbic acid no need for steroid or 

remdesivir


3-droplet isolation


We will follow on clinical condition and cultures to further adjust medication 

if needed


Thank you for this consultation we will follow the patient along with you


Dictation was produced using dragon dictation software. please excuse any 

grammatical, word or spelling errors. 





Time with Patient: Greater than 30

## 2023-12-07 NOTE — P.NPCON
History of Present Illness





- Reason for Consult


end stage renal disease





- History of Present Illness





Patient is a 78-year-old female with end-stage renal disease on hemodialysis on 

a Monday Wednesday Friday schedule.  Patient is admitted to the hospital with 

complaints of increased weakness.  She has had's some cough.  No fever.


Patient tested positive for COVID-19.


No complaints of chest pain shortness of breath nausea or vomiting.


Admits to decreased oral intake recently.





Review of Systems





As per HPI





Past Medical History


Past Medical History: Asthma, Cancer, COPD, Dialysis, Hyperlipidemia, 

Hypertension, Myocardial Infarction (MI), Osteoarthritis (OA), Pneumonia, Renal 

Disease, Thyroid Disorder


Additional Past Medical History / Comment(s): Hx Uterine cancer 25 yrs ago, 

hemodialysis Mon, Wed and Fri, varicose veins., "mild heart attack"" "brain 

bleed twice" chronic diarrhea, hx "14 polyps", "chronic back pain", anemia, 

current edema rufina feet, diet control diabetic


Last Myocardial Infarction Date:: unknown


History of Any Multi-Drug Resistant Organisms: None Reported


Past Surgical History: Appendectomy, Bariatric Surgery, Cholecystectomy, 

Hysterectomy, Joint Replacement, Tonsillectomy


Additional Past Surgical History / Comment(s): gastric bypass, surgery after 

fall -fx rt leg and foot, rufina knee replacement, rufina cataracts, surgery x 2 or 

"brain bleed", fistula for dialysis, COLONOSCOPY,


Past Anesthesia/Blood Transfusion Reactions: Family History of Problems w/ 

Anesthesia


Additional Past Anesthesia/Blood Transfusion Reaction / Comment(s): MOTHER PONV


Past Psychological History: No Psychological Hx Reported


Smoking Status: Former smoker


Past Alcohol Use History: None Reported


Additional Past Alcohol Use History / Comment(s): Quit smoking 30+ yrs ago.


Past Drug Use History: None Reported





- Past Family History


  ** Mother


Family Medical History: Cancer





  ** Father


Family Medical History: Diabetes Mellitus, Hypertension





  ** Sister(s)


Family Medical History: Cancer





Medications and Allergies


                                Home Medications











 Medication  Instructions  Recorded  Confirmed  Type


 


Furosemide [Lasix] 20 mg PO BID 02/04/20 12/06/23 History


 


Levothyroxine Sodium [Synthroid] 112 mcg PO AC-BRKFST 02/04/20 12/06/23 History


 


Rosuvastatin Calcium [Crestor] 10 mg PO HS 02/04/20 12/06/23 History


 


Calcium Acetate 1,334 mg PO AC-TID 11/09/20 12/06/23 History


 


Metoprolol Tartrate [Lopressor] 50 mg PO BID 11/09/20 12/06/23 History


 


amLODIPine [Norvasc] 10 mg PO DAILY 06/21/21 12/06/23 History


 


oxyCODONE-APAP 10-325MG [Percocet 1 tab PO TID PRN 01/07/23 12/06/23 History





 mg]    


 


Albuterol Inhaler [Ventolin Hfa 2 puff INHALATION RT-QID PRN 05/04/23 12/06/23 

History





Inhaler]    


 


Albuterol Nebulized [Ventolin 2.5 mg INHALATION RT-QID PRN 05/04/23 12/06/23 

History





Nebulized]    


 


Vit B Comp No.3/Folic/C/Biotin 1 tab PO DAILY 10/31/23 12/06/23 History





[Bella-Hyacinth Rx Tablet]    


 


Budesonide/Formoterol Fumarate 2 puff INHALATION RT-BID PRN 12/06/23 12/06/23 

History





[Symbicort 160-4.5 Mcg Inhaler]    


 


Ipratropium Nebulized [Atrovent 0.5 mg INHALATION RT-TID PRN 12/06/23 12/06/23 

History





Nebulized 0.2 MG/ML]    


 


hydrALAZINE HCL [Apresoline] 25 mg PO BID 12/06/23 12/06/23 History


 


lisinopriL 40 mg PO DAILY 12/06/23 12/06/23 History








                                    Allergies











Allergy/AdvReac Type Severity Reaction Status Date / Time


 


cefuroxime Allergy  Rash/Hives Verified 12/06/23 17:35


 


naproxen [From Naprosyn] Allergy  Dyspnea Verified 12/06/23 17:35


 


blood thinner Allergy  "brain Uncoded 12/06/23 17:35





   bleed"  














Physical Exam


Vitals: 


                                   Vital Signs











  Temp Pulse Pulse Resp BP BP Pulse Ox


 


 12/07/23 08:25     19   


 


 12/07/23 07:34  97.5 F L   70  19   109/67  98


 


 12/07/23 04:17    71  18   116/61  97


 


 12/06/23 22:50  97.5 F L   61    100/58  97


 


 12/06/23 21:51   74   18  108/51   97


 


 12/06/23 19:25   68   18  102/40   95


 


 12/06/23 16:07   73   18  110/45   96


 


 12/06/23 15:03   63   23  91/56   94 L


 


 12/06/23 14:26  97.9 F  69   18  77/46   98








                                Intake and Output











 12/06/23 12/07/23 12/07/23





 22:59 06:59 14:59


 


Other:   


 


  # Voids  3 


 


  Weight 102.058 kg  














Patient is awake, comfortable, no acute distress


Examination of the heart S1 and S2


Examination of the lungs bilateral breath sounds are heard


Abdomen is soft nontender


Examination of lower extremity shows 2+ edema


CNS exam grossly intact





Results





- Lab Results


                             Most recent lab results











Calcium  8.6 mg/dL (8.4-10.2)   12/07/23  08:14    


 


Magnesium  1.9 mg/dL (1.6-2.3)   12/07/23  08:14    














                                 12/07/23 08:14





                                 12/07/23 08:14





Assessment and Plan


Assessment: 





1.  End-stage renal disease on hemodialysis on a Monday Wednesday Friday 

schedule


2.  Covid infection with chest x-ray showing no evidence of acute process.  

Patient is maintained on room air.  Most significant complaint is fatigue.


3.  CK D mineral bone disorder


4.  Hypertension with CK D stage V.  Blood pressure is currently low


Plan: 





Hemodialysis in a.m.


Add parameters for antihypertensive medications as blood pressure is quite low.


Hold amlodipine for now


Continue with PhosLo





Thank you for the consultation.  We will continue to follow the patient with you

during her hospitalization.

## 2023-12-08 LAB
ALBUMIN SERPL-MCNC: 3.2 G/DL (ref 3.8–4.9)
ALBUMIN/GLOB SERPL: 1.33 RATIO (ref 1.6–3.17)
ALP SERPL-CCNC: 194 U/L (ref 41–126)
ALT SERPL-CCNC: 20 U/L (ref 8–44)
ANION GAP SERPL CALC-SCNC: 15.1 MMOL/L (ref 4–12)
AST SERPL-CCNC: 27 U/L (ref 13–35)
BASOPHILS # BLD AUTO: 0.01 X 10*3/UL (ref 0–0.1)
BASOPHILS NFR BLD AUTO: 0.2 %
BUN SERPL-SCNC: 47.4 MG/DL (ref 9–27)
BUN/CREAT SERPL: 8.17 RATIO (ref 12–20)
CALCIUM SPEC-MCNC: 8.6 MG/DL (ref 8.7–10.3)
CHLORIDE SERPL-SCNC: 94 MMOL/L (ref 96–109)
CO2 SERPL-SCNC: 24.9 MMOL/L (ref 21.6–31.8)
EOSINOPHIL # BLD AUTO: 0.25 X 10*3/UL (ref 0.04–0.35)
EOSINOPHIL NFR BLD AUTO: 5.1 %
ERYTHROCYTE [DISTWIDTH] IN BLOOD BY AUTOMATED COUNT: 2.92 X 10*6/UL (ref 4.1–5.2)
ERYTHROCYTE [DISTWIDTH] IN BLOOD: 14.2 % (ref 11.5–14.5)
GLOBULIN SER CALC-MCNC: 2.4 G/DL (ref 1.6–3.3)
GLUCOSE SERPL-MCNC: 92 MG/DL (ref 70–110)
HCT VFR BLD AUTO: 30 % (ref 37.2–46.3)
HGB BLD-MCNC: 9.7 G/DL (ref 12–15)
IMM GRANULOCYTES BLD QL AUTO: 2.5 %
LDH SPEC-CCNC: 178 U/L (ref 120–246)
LYMPHOCYTES # SPEC AUTO: 1.5 X 10*3/UL (ref 0.9–5)
LYMPHOCYTES NFR SPEC AUTO: 30.9 %
MAGNESIUM SPEC-SCNC: 1.8 MG/DL (ref 1.5–2.4)
MCH RBC QN AUTO: 33.2 PG (ref 27–32)
MCHC RBC AUTO-ENTMCNC: 32.3 G/DL (ref 32–37)
MCV RBC AUTO: 102.7 FL (ref 80–97)
MONOCYTES # BLD AUTO: 0.49 X 10*3/UL (ref 0.2–1)
MONOCYTES NFR BLD AUTO: 10.1 %
NEUTROPHILS # BLD AUTO: 2.49 X 10*3/UL (ref 1.8–7.7)
NEUTROPHILS NFR BLD AUTO: 51.2 %
NRBC BLD AUTO-RTO: 0 X 10*3/UL (ref 0–0.01)
PLATELET # BLD AUTO: 190 X 10*3/UL (ref 140–440)
POTASSIUM SERPL-SCNC: 4.5 MMOL/L (ref 3.5–5.5)
PROT SERPL-MCNC: 5.6 G/DL (ref 6.2–8.2)
SODIUM SERPL-SCNC: 134 MMOL/L (ref 135–145)
WBC # BLD AUTO: 4.86 X 10*3/UL (ref 4.5–10)

## 2023-12-08 PROCEDURE — 5A1D70Z PERFORMANCE OF URINARY FILTRATION, INTERMITTENT, LESS THAN 6 HOURS PER DAY: ICD-10-PCS

## 2023-12-08 RX ADMIN — Medication SCH EACH: at 09:22

## 2023-12-08 RX ADMIN — OXYCODONE HYDROCHLORIDE AND ACETAMINOPHEN PRN EACH: 10; 325 TABLET ORAL at 07:27

## 2023-12-08 RX ADMIN — ATORVASTATIN CALCIUM SCH MG: 20 TABLET, FILM COATED ORAL at 20:39

## 2023-12-08 RX ADMIN — HEPARIN SODIUM SCH UNIT: 5000 INJECTION, SOLUTION INTRAVENOUS; SUBCUTANEOUS at 23:31

## 2023-12-08 RX ADMIN — LEVOTHYROXINE SODIUM SCH MCG: 0.11 TABLET ORAL at 06:22

## 2023-12-08 RX ADMIN — Medication SCH MG: at 17:06

## 2023-12-08 RX ADMIN — Medication SCH MG: at 12:20

## 2023-12-08 RX ADMIN — OXYCODONE HYDROCHLORIDE AND ACETAMINOPHEN PRN EACH: 10; 325 TABLET ORAL at 23:31

## 2023-12-08 RX ADMIN — METOPROLOL TARTRATE SCH: 50 TABLET, FILM COATED ORAL at 11:29

## 2023-12-08 RX ADMIN — Medication SCH MG: at 06:21

## 2023-12-08 RX ADMIN — METOPROLOL TARTRATE SCH: 50 TABLET, FILM COATED ORAL at 20:36

## 2023-12-08 RX ADMIN — HEPARIN SODIUM SCH UNIT: 5000 INJECTION, SOLUTION INTRAVENOUS; SUBCUTANEOUS at 17:07

## 2023-12-08 RX ADMIN — HEPARIN SODIUM SCH UNIT: 5000 INJECTION, SOLUTION INTRAVENOUS; SUBCUTANEOUS at 09:22

## 2023-12-08 RX ADMIN — OXYCODONE HYDROCHLORIDE AND ACETAMINOPHEN PRN EACH: 10; 325 TABLET ORAL at 14:49

## 2023-12-08 RX ADMIN — AZITHROMYCIN SCH MG: 500 TABLET, FILM COATED ORAL at 12:20

## 2023-12-08 RX ADMIN — OXYCODONE HYDROCHLORIDE AND ACETAMINOPHEN SCH MG: 500 TABLET ORAL at 09:22

## 2023-12-08 RX ADMIN — Medication SCH MG: at 09:22

## 2023-12-08 NOTE — P.PN
Subjective


Progress Note Date: 12/08/23





* 78-year-old patient with past medical history significant for end-stage renal 

  disease on hemodialysis, history of anemia from chronic kidney disease, 

  hyperlipidemia, hypertension, osteoarthritis, obesity, presents to the 

  emergency department with complains of generalized weakness, fatigue.  Patient

  states she has not been feeling well for the last 1 week with generalized 

  weakness, cough, congestion, nausea and diarrhea.  Patient stated that she had

  severe limitation with activity of daily living secondary to profound 

  weakness.


* Workup initiated in ER including serum chemistry which showed sodium of 135 

  potassium of 3 chloride of 94 BU and 27 creatinine 3.78 lactate of 4.5 AST 38 

  AST 26 troponin within normal limits


* CBC obtained showed WBC 4.2 hemoglobin 9.8 hematocrit 29 platelet count 194


* Initial lactate obtained 4.5 follow-up lactate 2.2 and 1.6


* While in ER patient was given 2 L of fluid bolus followed by potassium 

  supplementation, patient was admitted to medical floor for management of 

  profound weakness from Covid 19 with consultations from nephrology for 

  maintenance of hemodialysis


* Patient states she went to an urgent care a few days ago and was given 

  cefuroxime for lower extremity cellulitis, on evaluation patient does have 

  recent dermatitis vasculitis noted patient had itching and rash from sulfa 

  drugs ALLERGY list updated





* 12/8/2023: Patient seen and evaluated bedside, patient does complain of cough 

  with productive phlegm, remains on room air, plan for hemodialysis today, CRP 

  within normal limits, sodium 134 magnesium 1.8 showed to CVA hemodialysis 

  today he had CBC reviewed WBC within normal limits M 11 9.7 platelet count 

  within normal limits








REVIEW OF SYSTEMS: Generalized weakness, nausea, diarrhea, fatigue, cough 


CONSTITUTIONAL: No fever, no malaise, no fatigue. 


HEENT: No recent visual problems or hearing problems. Denied any sore throat. 


CARDIOVASCULAR: No chest pain, orthopnea, PND, no palpitations, no syncope. 


PULMONARY: Generalized weakness, nausea, diarrhea, fatigue, cough


GASTROINTESTINAL: Generalized weakness, nausea, diarrhea, fatigue, cough


NEUROLOGICAL: No headaches, no weakness, no numbness. 


HEMATOLOGICAL: Denies any bleeding or petechiae. 


GENITOURINARY: Denies any burning micturition, frequency, or urgency. 


MUSCULOSKELETAL/RHEUMATOLOGICAL: Denies any joint pain, swelling, or any muscle 

pain. 


ENDOCRINE: Denies any polyuria or polydipsia. 





The rest of the 14-point review of systems is negative.











PHYSICAL EXAMINATION: 





GENERAL: The patient is alert and oriented x3, ill appearance


HEENT: Pupils are round and equally reacting to light. EOMI.


CARDIOVASCULAR: S1 and S2 present. No murmurs, rubs, or gallops. 


PULMONARY: Chest is clear to auscultation, no wheezing or crackles. 


ABDOMEN: Soft, nontender, nondistended, normoactive bowel sounds. No palpable 

organomegaly. 


MUSCULOSKELETAL: No joint swelling or deformity.


EXTREMITIES: No cyanosis, clubbing, or pedal edema. 


NEUROLOGICAL: Gross neurological examination did not reveal any focal deficits. 


SKIN: Skin changes bilateral lower extremity from edema 





Objective





- Vital Signs


Vital signs: 


                                   Vital Signs











Temp  98.1 F   12/08/23 07:13


 


Pulse  72   12/08/23 07:13


 


Resp  19   12/08/23 07:13


 


BP  103/58   12/08/23 07:13


 


Pulse Ox  95   12/08/23 07:13


 


FiO2      








                                 Intake & Output











 12/07/23 12/08/23 12/08/23





 18:59 06:59 18:59


 


Intake Total  800 


 


Balance  800 


 


Intake:   


 


  Oral  800 


 


Other:   


 


  # Voids 3 3 














- Labs


CBC & Chem 7: 


                                 12/08/23 06:37





                                 12/08/23 06:37


Labs: 


                  Abnormal Lab Results - Last 24 Hours (Table)











  12/06/23 12/08/23 12/08/23 Range/Units





  15:11 06:37 06:37 


 


RBC   2.92 L   (4.10-5.20)  X 10*6/uL


 


Hgb   9.7 L   (12.0-15.0)  g/dL


 


Hct   30.0 L   (37.2-46.3)  %


 


MCV   102.7 H   (80.0-97.0)  FL


 


MCH   33.2 H   (27.0-32.0)  pg


 


Immature Gran #   0.12 H   (0.00-0.04)  X 10*3/uL


 


Sodium    134 L  (135-145)  mmol/L


 


Chloride    94 L  ()  mmol/L


 


Anion Gap    15.10 H  (4.00-12.00)  mmol/L


 


BUN    47.4 H  (9.0-27.0)  mg/dL


 


Creatinine    5.8 H  (0.6-1.5)  mg/dL


 


Est GFR (CKD-EPI)    7 L  (>=60)   


 


BUN/Creatinine Ratio    8.17 L  (12.00-20.00)  Ratio


 


Calcium    8.6 L  (8.7-10.3)  mg/dL


 


Alkaline Phosphatase    194 H  ()  U/L


 


Total Protein    5.6 L  (6.2-8.2)  g/dL


 


Albumin    3.2 L  (3.8-4.9)  g/dL


 


Albumin/Globulin Ratio    1.33 L  (1.60-3.17)  Ratio


 


Urine Appearance  Cloudy H    (Clear)  


 


Urine Protein  1+ H    (Negative)  


 


Urine Ketones  1+ H    (Negative)  


 


Urine Blood  Trace H    (Negative)  


 


Urine Bilirubin  1+ H    (Negative)  


 


Ur Leukocyte Esterase  Small H    (Negative)  


 


Urine RBC  12 H    (0-5)  /hpf


 


Urine WBC  112 H    (0-5)  /hpf


 


Ur Squamous Epith Cells  33 H    (0-4)  /hpf


 


Urine Bacteria  Rare H    (None)  /hpf


 


Urine Mucus  Rare H    (None)  /hpf














Assessment and Plan


Assessment: 








Assessment and plan





* Covid 19 infection with generalized fatigue and malaise/diarrhea


* History of COPD with tracheobronchitis


* End-stage renal disease on hemodialysis


* Anemia of chronic kidney disease


* Lactic acidosis on admission secondary to dehydration


* Hypokalemia on admission


* History of hypertension








* In regards to viral pneumonia from Covid 19, continue to follow-up on 

  inflammation markers, continue to monitor for desaturation, continue breathing

  treatments on Symbicort and albuterol, noted on azithromycin for tracheobronch

  itis


* In regards to history of COPD continue patient on albuterol and Symbicort, 

  monitor for desaturation


* In regards to end-stage renal disease on hemodialysis nephrology consulted, 

  dialysis scheduled for 12/8


* In regards to lactic acidosis likely secondary to volume depletion, he should 

  resuscitated with fluid, Lasix placed on hold, nephrology to monitor/manage 

  fluids with dialysis


* In regards to hyperkalemia patient given potassium supplementation


* In regards to history of hypertension continue hydralazine, metoprolol, 

  lisinopril


* CODE STATUS is okay with CPR and no intubation


Time with Patient: Greater than 30

## 2023-12-08 NOTE — P.PN
Subjective





Patient is seen for follow-up for end-stage renal disease.  Admitted to the 

hospital with complaints of fatigue and tested positive for COVID-19.  





Patient is seen on hemodialysis.  Blood pressure is low.  She continues to 

complain of weakness.


No shortness of breath





Objective





- Vital Signs


Vital signs: 


                                   Vital Signs











Temp  98.1 F   12/08/23 07:13


 


Pulse  72   12/08/23 07:13


 


Resp  19   12/08/23 07:13


 


BP  103/58   12/08/23 07:13


 


Pulse Ox  95   12/08/23 07:13


 


FiO2      








                                 Intake & Output











 12/07/23 12/08/23 12/08/23





 18:59 06:59 18:59


 


Intake Total  800 


 


Balance  800 


 


Intake:   


 


  Oral  800 


 


Other:   


 


  # Voids 3 3 














- Exam








Patient is awake, comfortable, no acute distress


Examination of the heart S1 and S2


Examination of the lungs bilateral breath sounds are heard


Abdomen is soft nontender


Examination of lower extremity shows 2+ edema


CNS exam grossly intact





- Labs


CBC & Chem 7: 


                                 12/08/23 06:37





                                 12/08/23 06:37


Labs: 


                  Abnormal Lab Results - Last 24 Hours (Table)











  12/06/23 12/08/23 12/08/23 Range/Units





  15:11 06:37 06:37 


 


RBC   2.92 L   (4.10-5.20)  X 10*6/uL


 


Hgb   9.7 L   (12.0-15.0)  g/dL


 


Hct   30.0 L   (37.2-46.3)  %


 


MCV   102.7 H   (80.0-97.0)  FL


 


MCH   33.2 H   (27.0-32.0)  pg


 


Immature Gran #   0.12 H   (0.00-0.04)  X 10*3/uL


 


Sodium    134 L  (135-145)  mmol/L


 


Chloride    94 L  ()  mmol/L


 


Anion Gap    15.10 H  (4.00-12.00)  mmol/L


 


BUN    47.4 H  (9.0-27.0)  mg/dL


 


Creatinine    5.8 H  (0.6-1.5)  mg/dL


 


Est GFR (CKD-EPI)    7 L  (>=60)   


 


BUN/Creatinine Ratio    8.17 L  (12.00-20.00)  Ratio


 


Calcium    8.6 L  (8.7-10.3)  mg/dL


 


Alkaline Phosphatase    194 H  ()  U/L


 


Total Protein    5.6 L  (6.2-8.2)  g/dL


 


Albumin    3.2 L  (3.8-4.9)  g/dL


 


Albumin/Globulin Ratio    1.33 L  (1.60-3.17)  Ratio


 


Urine Appearance  Cloudy H    (Clear)  


 


Urine Protein  1+ H    (Negative)  


 


Urine Ketones  1+ H    (Negative)  


 


Urine Blood  Trace H    (Negative)  


 


Urine Bilirubin  1+ H    (Negative)  


 


Ur Leukocyte Esterase  Small H    (Negative)  


 


Urine RBC  12 H    (0-5)  /hpf


 


Urine WBC  112 H    (0-5)  /hpf


 


Ur Squamous Epith Cells  33 H    (0-4)  /hpf


 


Urine Bacteria  Rare H    (None)  /hpf


 


Urine Mucus  Rare H    (None)  /hpf














Assessment and Plan


Assessment: 





1.  End-stage renal disease on hemodialysis on a Monday Wednesday Friday 

schedule


2.  Covid infection with chest x-ray showing no evidence of acute process.  

Patient is maintained on room air.  Most significant complaint is fatigue.


3.  CK D mineral bone disorder


4.  Hypertension with CK D stage V.  Blood pressure is currently low


Plan: 





Hemodialysis today.  


Midodrine hemodialysis


Add parameters for antihypertensive medications as blood pressure is quite low.


Continue with PhosLo

## 2023-12-09 LAB
ALBUMIN SERPL-MCNC: 3.1 G/DL (ref 3.8–4.9)
ALBUMIN/GLOB SERPL: 1.41 RATIO (ref 1.6–3.17)
ALP SERPL-CCNC: 181 U/L (ref 41–126)
ALT SERPL-CCNC: 19 U/L (ref 8–44)
ANION GAP SERPL CALC-SCNC: 11.6 MMOL/L (ref 4–12)
AST SERPL-CCNC: 25 U/L (ref 13–35)
BASOPHILS # BLD AUTO: 0.03 X 10*3/UL (ref 0–0.1)
BASOPHILS NFR BLD AUTO: 0.6 %
BUN SERPL-SCNC: 27.3 MG/DL (ref 9–27)
BUN/CREAT SERPL: 7.18 RATIO (ref 12–20)
CALCIUM SPEC-MCNC: 8.4 MG/DL (ref 8.7–10.3)
CHLORIDE SERPL-SCNC: 97 MMOL/L (ref 96–109)
CO2 SERPL-SCNC: 26.4 MMOL/L (ref 21.6–31.8)
EOSINOPHIL # BLD AUTO: 0.19 X 10*3/UL (ref 0.04–0.35)
EOSINOPHIL NFR BLD AUTO: 4.1 %
ERYTHROCYTE [DISTWIDTH] IN BLOOD BY AUTOMATED COUNT: 2.63 X 10*6/UL (ref 4.1–5.2)
ERYTHROCYTE [DISTWIDTH] IN BLOOD: 14.6 % (ref 11.5–14.5)
GLOBULIN SER CALC-MCNC: 2.2 G/DL (ref 1.6–3.3)
GLUCOSE SERPL-MCNC: 88 MG/DL (ref 70–110)
HBV SURFACE AB SERPL IA-ACNC: 3.5 MIU/ML
HCT VFR BLD AUTO: 27.2 % (ref 37.2–46.3)
HGB BLD-MCNC: 8.7 G/DL (ref 12–15)
IMM GRANULOCYTES BLD QL AUTO: 2.1 %
LDH SPEC-CCNC: 147 U/L (ref 120–246)
LYMPHOCYTES # SPEC AUTO: 1.33 X 10*3/UL (ref 0.9–5)
LYMPHOCYTES NFR SPEC AUTO: 28.4 %
MAGNESIUM SPEC-SCNC: 1.8 MG/DL (ref 1.5–2.4)
MCH RBC QN AUTO: 33.1 PG (ref 27–32)
MCHC RBC AUTO-ENTMCNC: 32 G/DL (ref 32–37)
MCV RBC AUTO: 103.4 FL (ref 80–97)
MONOCYTES # BLD AUTO: 0.45 X 10*3/UL (ref 0.2–1)
MONOCYTES NFR BLD AUTO: 9.6 %
NEUTROPHILS # BLD AUTO: 2.59 X 10*3/UL (ref 1.8–7.7)
NEUTROPHILS NFR BLD AUTO: 55.2 %
NRBC BLD AUTO-RTO: 0 X 10*3/UL (ref 0–0.01)
PLATELET # BLD AUTO: 189 X 10*3/UL (ref 140–440)
POTASSIUM SERPL-SCNC: 4.5 MMOL/L (ref 3.5–5.5)
PROT SERPL-MCNC: 5.3 G/DL (ref 6.2–8.2)
SODIUM SERPL-SCNC: 135 MMOL/L (ref 135–145)
WBC # BLD AUTO: 4.69 X 10*3/UL (ref 4.5–10)

## 2023-12-09 RX ADMIN — CALCIUM CARBONATE (ANTACID) CHEW TAB 500 MG PRN MG: 500 CHEW TAB at 20:47

## 2023-12-09 RX ADMIN — Medication SCH MG: at 08:27

## 2023-12-09 RX ADMIN — CALCIUM CARBONATE (ANTACID) CHEW TAB 500 MG PRN MG: 500 CHEW TAB at 17:34

## 2023-12-09 RX ADMIN — ATORVASTATIN CALCIUM SCH MG: 20 TABLET, FILM COATED ORAL at 20:48

## 2023-12-09 RX ADMIN — HEPARIN SODIUM SCH UNIT: 5000 INJECTION, SOLUTION INTRAVENOUS; SUBCUTANEOUS at 08:27

## 2023-12-09 RX ADMIN — Medication SCH MG: at 12:30

## 2023-12-09 RX ADMIN — LEVOTHYROXINE SODIUM SCH MCG: 0.11 TABLET ORAL at 06:35

## 2023-12-09 RX ADMIN — HEPARIN SODIUM SCH UNIT: 5000 INJECTION, SOLUTION INTRAVENOUS; SUBCUTANEOUS at 20:46

## 2023-12-09 RX ADMIN — OXYCODONE HYDROCHLORIDE AND ACETAMINOPHEN SCH MG: 500 TABLET ORAL at 08:27

## 2023-12-09 RX ADMIN — OXYCODONE HYDROCHLORIDE AND ACETAMINOPHEN PRN EACH: 10; 325 TABLET ORAL at 20:46

## 2023-12-09 RX ADMIN — Medication SCH MG: at 17:34

## 2023-12-09 RX ADMIN — AZITHROMYCIN SCH MG: 500 TABLET, FILM COATED ORAL at 08:27

## 2023-12-09 RX ADMIN — METOPROLOL TARTRATE SCH MG: 50 TABLET, FILM COATED ORAL at 08:28

## 2023-12-09 RX ADMIN — HEPARIN SODIUM SCH UNIT: 5000 INJECTION, SOLUTION INTRAVENOUS; SUBCUTANEOUS at 17:34

## 2023-12-09 RX ADMIN — METOPROLOL TARTRATE SCH: 50 TABLET, FILM COATED ORAL at 20:44

## 2023-12-09 RX ADMIN — Medication SCH MG: at 06:35

## 2023-12-09 RX ADMIN — ONDANSETRON PRN MG: 2 INJECTION INTRAMUSCULAR; INTRAVENOUS at 12:37

## 2023-12-09 RX ADMIN — Medication SCH EACH: at 08:27

## 2023-12-09 RX ADMIN — OXYCODONE HYDROCHLORIDE AND ACETAMINOPHEN PRN EACH: 10; 325 TABLET ORAL at 08:32

## 2023-12-09 NOTE — P.PN
Subjective





Patient is seen in follow-up for end-stage renal disease.  She is maintained on 

hemodialysis on Monday Wednesday Friday schedule.  Resting in bed.  Denies chest

pain or shortness of breath at this time.  Most recent blood pressure 121/72 but

was in the systolic 90s the prior 2 readings.  Complains of a cough with 

yellowish phlegm.





Vital signs are stable.


General: No acute distress.


HEENT: Head exam is unremarkable.


LUNGS: No audible rhonchi or wheezes.


HEART: Rate and Rhythm are regular. 


ABDOMEN: Nontender.


EXTREMITITES: No edema.





Objective





- Vital Signs


Vital signs: 


                                   Vital Signs











Temp  97.3 F L  12/09/23 07:17


 


Pulse  85   12/09/23 07:17


 


Resp  18   12/09/23 07:17


 


BP  121/72   12/09/23 07:17


 


Pulse Ox  99   12/09/23 07:17


 


FiO2      








                                 Intake & Output











 12/08/23 12/09/23 12/09/23





 18:59 06:59 18:59


 


Intake Total 400  


 


Output Total 1700  


 


Balance -1300  


 


Intake:   


 


  Hemodialysis 400  


 


Output:   


 


  Hemodialysis 1700  


 


Other:   


 


  # Voids 2 1 














- Labs


CBC & Chem 7: 


                                 12/08/23 06:37





                                 12/09/23 06:21


Labs: 


                  Abnormal Lab Results - Last 24 Hours (Table)











  12/08/23 12/08/23 12/09/23 Range/Units





  06:37 06:37 06:21 


 


RBC  2.92 L    (4.10-5.20)  X 10*6/uL


 


Hgb  9.7 L    (12.0-15.0)  g/dL


 


Hct  30.0 L    (37.2-46.3)  %


 


MCV  102.7 H    (80.0-97.0)  FL


 


MCH  33.2 H    (27.0-32.0)  pg


 


Immature Gran #  0.12 H    (0.00-0.04)  X 10*3/uL


 


Sodium   134 L   (135-145)  mmol/L


 


Chloride   94 L   ()  mmol/L


 


Anion Gap   15.10 H   (4.00-12.00)  mmol/L


 


BUN   47.4 H  27.3 H  (9.0-27.0)  mg/dL


 


Creatinine   5.8 H  3.8 H  (0.6-1.5)  mg/dL


 


Est GFR (CKD-EPI)   7 L  12 L  (>=60)   


 


BUN/Creatinine Ratio   8.17 L  7.18 L  (12.00-20.00)  Ratio


 


Calcium   8.6 L  8.4 L  (8.7-10.3)  mg/dL


 


Alkaline Phosphatase   194 H  181 H  ()  U/L


 


Total Protein   5.6 L  5.3 L  (6.2-8.2)  g/dL


 


Albumin   3.2 L  3.1 L  (3.8-4.9)  g/dL


 


Albumin/Globulin Ratio   1.33 L  1.41 L  (1.60-3.17)  Ratio














Assessment and Plan


Plan: 





Assessment:


1.  End-stage renal disease maintained on hemodialysis Monday Wednesday Friday 

schedule.


2.  Acute COVID-19 infection.


3.  Hypertension with chronic kidney disease.  


4.  Anemia of chronic kidney disease.


5.  Chronic kidney disease mineral bone disease maintained on PhosLo.





Plan:


Hemodialysis Monday.


Hold hydralazine and amlodipine for systolic blood pressure less than 120.


Check iron studies.

## 2023-12-10 LAB
ALBUMIN SERPL-MCNC: 3.7 G/DL (ref 3.5–5)
ALBUMIN/GLOB SERPL: 1.2 {RATIO}
ALP SERPL-CCNC: 210 U/L (ref 38–126)
ALT SERPL-CCNC: 24 U/L (ref 4–34)
ANION GAP SERPL CALC-SCNC: 12 MMOL/L
AST SERPL-CCNC: 36 U/L (ref 14–36)
BASOPHILS # BLD AUTO: 0 K/UL (ref 0–0.2)
BASOPHILS NFR BLD AUTO: 0 %
BUN SERPL-SCNC: 43 MG/DL (ref 7–17)
CALCIUM SPEC-MCNC: 9 MG/DL (ref 8.4–10.2)
CHLORIDE SERPL-SCNC: 94 MMOL/L (ref 98–107)
CO2 SERPL-SCNC: 27 MMOL/L (ref 22–30)
EOSINOPHIL # BLD AUTO: 0.2 K/UL (ref 0–0.7)
EOSINOPHIL NFR BLD AUTO: 5 %
ERYTHROCYTE [DISTWIDTH] IN BLOOD BY AUTOMATED COUNT: 3.08 M/UL (ref 3.8–5.4)
ERYTHROCYTE [DISTWIDTH] IN BLOOD: 15 % (ref 11.5–15.5)
FERRITIN SERPL-MCNC: 1306 NG/ML (ref 10–291)
GLOBULIN SER CALC-MCNC: 3.1 G/DL
GLUCOSE SERPL-MCNC: 84 MG/DL (ref 74–99)
HCT VFR BLD AUTO: 31.6 % (ref 34–46)
HGB BLD-MCNC: 10.3 GM/DL (ref 11.4–16)
IRON SERPL-MCNC: 68 UG/DL (ref 50–170)
LDH SPEC-CCNC: 178 U/L (ref 120–246)
LYMPHOCYTES # SPEC AUTO: 1.2 K/UL (ref 1–4.8)
LYMPHOCYTES NFR SPEC AUTO: 24 %
MAGNESIUM SPEC-SCNC: 1.8 MG/DL (ref 1.6–2.3)
MCH RBC QN AUTO: 33.6 PG (ref 25–35)
MCHC RBC AUTO-ENTMCNC: 32.6 G/DL (ref 31–37)
MCV RBC AUTO: 102.8 FL (ref 80–100)
MONOCYTES # BLD AUTO: 0.3 K/UL (ref 0–1)
MONOCYTES NFR BLD AUTO: 6 %
NEUTROPHILS # BLD AUTO: 3.2 K/UL (ref 1.3–7.7)
NEUTROPHILS NFR BLD AUTO: 63 %
PLATELET # BLD AUTO: 230 K/UL (ref 150–450)
POTASSIUM SERPL-SCNC: 4.5 MMOL/L (ref 3.5–5.1)
PROT SERPL-MCNC: 6.8 G/DL (ref 6.3–8.2)
SODIUM SERPL-SCNC: 133 MMOL/L (ref 137–145)
TIBC SERPL-MCNC: 218 UG/DL (ref 228–460)
VIT B12 SERPL-MCNC: 191 PG/ML (ref 200–944)
WBC # BLD AUTO: 5.1 K/UL (ref 3.8–10.6)

## 2023-12-10 RX ADMIN — OXYCODONE HYDROCHLORIDE AND ACETAMINOPHEN PRN EACH: 10; 325 TABLET ORAL at 06:59

## 2023-12-10 RX ADMIN — HEPARIN SODIUM SCH UNIT: 5000 INJECTION, SOLUTION INTRAVENOUS; SUBCUTANEOUS at 20:51

## 2023-12-10 RX ADMIN — ATORVASTATIN CALCIUM SCH MG: 20 TABLET, FILM COATED ORAL at 20:51

## 2023-12-10 RX ADMIN — CALCIUM CARBONATE (ANTACID) CHEW TAB 500 MG PRN MG: 500 CHEW TAB at 20:51

## 2023-12-10 RX ADMIN — PANTOPRAZOLE SODIUM SCH MG: 40 TABLET, DELAYED RELEASE ORAL at 07:00

## 2023-12-10 RX ADMIN — Medication SCH MG: at 12:47

## 2023-12-10 RX ADMIN — Medication SCH MG: at 09:00

## 2023-12-10 RX ADMIN — OXYCODONE HYDROCHLORIDE AND ACETAMINOPHEN PRN EACH: 10; 325 TABLET ORAL at 17:46

## 2023-12-10 RX ADMIN — METOPROLOL TARTRATE SCH: 50 TABLET, FILM COATED ORAL at 20:41

## 2023-12-10 RX ADMIN — HEPARIN SODIUM SCH UNIT: 5000 INJECTION, SOLUTION INTRAVENOUS; SUBCUTANEOUS at 09:00

## 2023-12-10 RX ADMIN — LEVOTHYROXINE SODIUM SCH MCG: 0.11 TABLET ORAL at 07:00

## 2023-12-10 RX ADMIN — AZITHROMYCIN SCH MG: 500 TABLET, FILM COATED ORAL at 09:00

## 2023-12-10 RX ADMIN — Medication SCH MG: at 17:54

## 2023-12-10 RX ADMIN — Medication SCH MG: at 07:00

## 2023-12-10 RX ADMIN — OXYCODONE HYDROCHLORIDE AND ACETAMINOPHEN SCH MG: 500 TABLET ORAL at 09:00

## 2023-12-10 RX ADMIN — CYANOCOBALAMIN SCH MCG: 1000 INJECTION, SOLUTION INTRAMUSCULAR; SUBCUTANEOUS at 17:47

## 2023-12-10 RX ADMIN — HEPARIN SODIUM SCH UNIT: 5000 INJECTION, SOLUTION INTRAVENOUS; SUBCUTANEOUS at 17:48

## 2023-12-10 RX ADMIN — METOPROLOL TARTRATE SCH MG: 50 TABLET, FILM COATED ORAL at 09:00

## 2023-12-10 RX ADMIN — Medication SCH EACH: at 09:00

## 2023-12-10 RX ADMIN — ONDANSETRON PRN MG: 2 INJECTION INTRAMUSCULAR; INTRAVENOUS at 08:59

## 2023-12-10 RX ADMIN — OXYCODONE HYDROCHLORIDE AND ACETAMINOPHEN PRN EACH: 10; 325 TABLET ORAL at 22:31

## 2023-12-10 RX ADMIN — CALCIUM CARBONATE (ANTACID) CHEW TAB 500 MG PRN MG: 500 CHEW TAB at 09:00

## 2023-12-10 NOTE — P.PN
Subjective








   78-year-old patient with past medical history significant for end-stage renal

disease on hemodialysis, history of anemia from chronic kidney disease, 

hyperlipidemia, hypertension, osteoarthritis, obesity, presents to the emergency

department with complains of generalized weakness, fatigue.  Patient states she 

has not been feeling well for the last 1 week with generalized weakness, cough, 

congestion, nausea and diarrhea.  Patient stated that she had severe limitation 

with activity of daily living secondary to profound weakness.


   Workup initiated in ER including serum chemistry which showed sodium of 135 

potassium of 3 chloride of 94 BU and 27 creatinine 3.78 lactate of 4.5 AST 38 

AST 26 troponin within normal limits


   CBC obtained showed WBC 4.2 hemoglobin 9.8 hematocrit 29 platelet count 194


   Initial lactate obtained 4.5 follow-up lactate 2.2 and 1.6


   While in ER patient was given 2 L of fluid bolus followed by potassium 

supplementation, patient was admitted to medical floor for management of 

profound weakness from Covid 19 with consultations from nephrology for 

maintenance of hemodialysis


   Patient states she went to an urgent care a few days ago and was given 

cefuroxime for lower extremity cellulitis, on evaluation patient does have 

recent dermatitis vasculitis noted patient had itching and rash from sulfa drugs

ALLERGY list updated





   12/8/2023: Patient seen and evaluated bedside, patient does complain of cough

with productive phlegm, remains on room air, plan for hemodialysis today, CRP 

within normal limits, sodium 134 magnesium 1.8 showed to CVA hemodialysis today 

he had CBC reviewed WBC within normal limits M 11 9.7 platelet count within 

normal limits








12/9/2023 


patient complains from generalized weakness and fatigue


No respiratory symptoms


Patient hemoglobin 8.7


Also patient complains from some stomach upset, Tums and Pepcid or Protonix is 

added


Patient continue with hemodialysis for ESRD  Nephrologist





12/10/2023 


Patient sitting in chair mildly dyspneic no chest pain


He is saturating well on the ventilator oxygen via nasal cannula in high 90s


D-dimer was mildly elevated, CTPA was negative for PE


Portcalcitonin is negative, LDH is normal, CRP mildly elevated


Most likely patient has COPD exacerbation secondary to Covid infection however 

patient was started on remdesivir 


Echocardiogram is pending in the recovery to Covid infection





Objective





- Vital Signs


Vital signs: 


                                   Vital Signs











Temp  98.5 F   12/10/23 13:50


 


Pulse  65   12/10/23 13:50


 


Resp  17   12/10/23 13:50


 


BP  91/51   12/10/23 13:50


 


Pulse Ox  94 L  12/10/23 13:50


 


FiO2      








                                 Intake & Output











 12/10/23 12/10/23 12/11/23





 06:59 18:59 06:59


 


Other:   


 


  # Voids 2 3 














- Exam





-GENERAL: The patient is alert and oriented x3, not in any acute distress. Well 

developed, well nourished.  Generally weak


HEENT: Pupils are round and equally reacting to light. EOMI. No scleral icterus.

No conjunctival pallor. Normocephalic, atraumatic. No pharyngeal erythema. No 

thyromegaly. 


CARDIOVASCULAR: S1 and S2 present. No murmurs, rubs, or gallops. 


PULMONARY: Chest is clear to auscultation, no wheezing , no crackles. 


ABDOMEN: Soft, nontender, nondistended, normoactive bowel sounds. No palpable 

organomegaly. 


MUSCULOSKELETAL: No joint swelling or deformity. 


EXTREMITIES: No cyanosis, clubbing, or pedal edema. 


NEUROLOGICAL: Gross neurological examination did not reveal any focal deficits. 


SKIN: No rashes. no petechiae.





- Labs


CBC & Chem 7: 


                                 12/10/23 07:47





                                 12/10/23 07:47


Labs: 


                  Abnormal Lab Results - Last 24 Hours (Table)











  12/09/23 12/10/23 12/10/23 Range/Units





  06:21 07:47 07:47 


 


RBC   3.08 L   (3.80-5.40)  m/uL


 


Hgb   10.3 L   (11.4-16.0)  gm/dL


 


Hct   31.6 L   (34.0-46.0)  %


 


MCV   102.8 H   (80.0-100.0)  fL


 


D-Dimer     (<0.60)  mg/L FEU


 


Sodium    133 L  (137-145)  mmol/L


 


Chloride    94 L  ()  mmol/L


 


BUN    43 H  (7-17)  mg/dL


 


Creatinine    5.12 H  (0.52-1.04)  mg/dL


 


TIBC  218 L    (228-460)  UG/DL


 


Transferrin  156.0 L    (204.0-354.0)  mg/dL


 


Ferritin  1306.0 H    (10.0-291.0)  ng/mL


 


Alkaline Phosphatase    210 H  ()  U/L


 


Vitamin B12    191.0 L  (200.0-944.0)  pg/mL














  12/10/23 Range/Units





  16:07 


 


RBC   (3.80-5.40)  m/uL


 


Hgb   (11.4-16.0)  gm/dL


 


Hct   (34.0-46.0)  %


 


MCV   (80.0-100.0)  fL


 


D-Dimer  0.98 H  (<0.60)  mg/L FEU


 


Sodium   (137-145)  mmol/L


 


Chloride   ()  mmol/L


 


BUN   (7-17)  mg/dL


 


Creatinine   (0.52-1.04)  mg/dL


 


TIBC   (228-460)  UG/DL


 


Transferrin   (204.0-354.0)  mg/dL


 


Ferritin   (10.0-291.0)  ng/mL


 


Alkaline Phosphatase   ()  U/L


 


Vitamin B12   (200.0-944.0)  pg/mL














Assessment and Plan


Assessment: 





   Covid 19 infection with generalized fatigue and malaise/diarrhea


   Anemia of chronic kidney disease, rule out other causes


   History of COPD with tracheobronchitis


   End-stage renal disease on hemodialysis


   Lactic acidosis on admission secondary to dehydration


   Hypokalemia on admission


   History of hypertension


Plan: 





Continue with anemia workup, iron, folate, B12 and occult blood in stool


Monitor hemoglobin


Protonix and Tums


Nephrology consult is appreciated


fatique due to viral infection and other chronic medical problems


Labs and medication were reviewed..  Continue same treatment.  Continue with 

symptomatic treatment.  Resume home medication.  Monitor labs and vitals.  DVT 

and GI prophylaxis.  Further recommendations as per clinical course of the 

patient


DVT prophylaxis: Subcutaneous heparin


GI Prophylaxis: Ppi


PT/OT: Pending


Prognosis is guarded

## 2023-12-10 NOTE — P.PN
Subjective





Patient is seen in follow-up for end-stage renal disease.  She is maintained on 

hemodialysis on Monday Wednesday Friday schedule.  Resting in bed.  Denies chest

pain or shortness of breath at this time.  Blood pressure controlled.  Continues

to have a cough.  On room air.





Vital signs are stable.


General: No acute distress.


HEENT: Head exam is unremarkable.


LUNGS: No audible rhonchi or wheezes.


HEART: Rate and Rhythm are regular. 


ABDOMEN: Nontender.


EXTREMITITES: No edema.





Objective





- Vital Signs


Vital signs: 


                                   Vital Signs











Temp  97.7 F   12/10/23 07:13


 


Pulse  72   12/10/23 07:13


 


Resp  18   12/10/23 07:13


 


BP  129/66   12/10/23 07:13


 


Pulse Ox  98   12/10/23 07:13


 


FiO2      








                                 Intake & Output











 12/09/23 12/10/23 12/10/23





 18:59 06:59 18:59


 


Other:   


 


  # Voids 2 2 


 


  # Bowel Movements 2  














- Labs


CBC & Chem 7: 


                                 12/10/23 07:47





                                 12/10/23 07:47


Labs: 


                  Abnormal Lab Results - Last 24 Hours (Table)











  12/09/23 12/10/23 12/10/23 Range/Units





  06:21 07:47 07:47 


 


RBC   3.08 L   (3.80-5.40)  m/uL


 


Hgb   10.3 L   (11.4-16.0)  gm/dL


 


Hct   31.6 L   (34.0-46.0)  %


 


MCV   102.8 H   (80.0-100.0)  fL


 


Sodium    133 L  (137-145)  mmol/L


 


Chloride    94 L  ()  mmol/L


 


BUN    43 H  (7-17)  mg/dL


 


Creatinine    5.12 H  (0.52-1.04)  mg/dL


 


TIBC  218 L    (228-460)  UG/DL


 


Transferrin  156.0 L    (204.0-354.0)  mg/dL


 


Ferritin  1306.0 H    (10.0-291.0)  ng/mL


 


Alkaline Phosphatase    210 H  ()  U/L














Assessment and Plan


Plan: 





Assessment:


1.  End-stage renal disease maintained on hemodialysis Monday Wednesday Friday 

schedule.


2.  Acute COVID-19 infection.


3.  Hypertension with chronic kidney disease.  


4.  Anemia of chronic kidney disease.  Iron replete.


5.  Chronic kidney disease mineral bone disease maintained on PhosLo.





Plan:


Hemodialysis Monday.


Hold hydralazine and amlodipine for systolic blood pressure less than 120.


Add Aranesp.

## 2023-12-10 NOTE — P.PN
Subjective








   78-year-old patient with past medical history significant for end-stage renal

disease on hemodialysis, history of anemia from chronic kidney disease, 

hyperlipidemia, hypertension, osteoarthritis, obesity, presents to the emergency

department with complains of generalized weakness, fatigue.  Patient states she 

has not been feeling well for the last 1 week with generalized weakness, cough, 

congestion, nausea and diarrhea.  Patient stated that she had severe limitation 

with activity of daily living secondary to profound weakness.


   Workup initiated in ER including serum chemistry which showed sodium of 135 

potassium of 3 chloride of 94 BU and 27 creatinine 3.78 lactate of 4.5 AST 38 

AST 26 troponin within normal limits


   CBC obtained showed WBC 4.2 hemoglobin 9.8 hematocrit 29 platelet count 194


   Initial lactate obtained 4.5 follow-up lactate 2.2 and 1.6


   While in ER patient was given 2 L of fluid bolus followed by potassium 

supplementation, patient was admitted to medical floor for management of 

profound weakness from Covid 19 with consultations from nephrology for 

maintenance of hemodialysis


   Patient states she went to an urgent care a few days ago and was given 

cefuroxime for lower extremity cellulitis, on evaluation patient does have 

recent dermatitis vasculitis noted patient had itching and rash from sulfa drugs

ALLERGY list updated





   12/8/2023: Patient seen and evaluated bedside, patient does complain of cough

with productive phlegm, remains on room air, plan for hemodialysis today, CRP 

within normal limits, sodium 134 magnesium 1.8 showed to CVA hemodialysis today 

he had CBC reviewed WBC within normal limits M 11 9.7 platelet count within 

normal limits








12/10/2023 


patient complains from generalized weakness and fatigue


No respiratory symptoms


Patient hemoglobin 8.7


Also patient complains from some stomach upset, Tums and Pepcid or Protonix is 

added


Patient continue with hemodialysis for ESRD  Nephrologist














Objective





- Vital Signs


Vital signs: 


                                   Vital Signs











Temp  97.3 F L  12/09/23 07:17


 


Pulse  85   12/09/23 07:17


 


Resp  18   12/09/23 07:17


 


BP  121/72   12/09/23 07:17


 


Pulse Ox  99   12/09/23 07:17


 


FiO2      








                                 Intake & Output











 12/08/23 12/09/23 12/09/23





 18:59 06:59 18:59


 


Intake Total 400  


 


Output Total 1700  


 


Balance -1300  


 


Intake:   


 


  Hemodialysis 400  


 


Output:   


 


  Hemodialysis 1700  


 


Other:   


 


  # Voids 2 1 














- Exam





-GENERAL: The patient is alert and oriented x3, not in any acute distress. Well 

developed, well nourished.  Generally weak


HEENT: Pupils are round and equally reacting to light. EOMI. No scleral icterus.

No conjunctival pallor. Normocephalic, atraumatic. No pharyngeal erythema. No 

thyromegaly. 


CARDIOVASCULAR: S1 and S2 present. No murmurs, rubs, or gallops. 


PULMONARY: Chest is clear to auscultation, no wheezing , no crackles. 


ABDOMEN: Soft, nontender, nondistended, normoactive bowel sounds. No palpable 

organomegaly. 


MUSCULOSKELETAL: No joint swelling or deformity. 


EXTREMITIES: No cyanosis, clubbing, or pedal edema. 


NEUROLOGICAL: Gross neurological examination did not reveal any focal deficits. 


SKIN: No rashes. no petechiae.





- Labs


CBC & Chem 7: 


                                 12/09/23 06:21





                                 12/09/23 06:21


Labs: 


                  Abnormal Lab Results - Last 24 Hours (Table)











  12/09/23 12/09/23 Range/Units





  06:21 06:21 


 


RBC  2.63 L   (4.10-5.20)  X 10*6/uL


 


Hgb  8.7 L   (12.0-15.0)  g/dL


 


Hct  27.2 L   (37.2-46.3)  %


 


MCV  103.4 H   (80.0-97.0)  FL


 


MCH  33.1 H   (27.0-32.0)  pg


 


RDW  14.6 H   (11.5-14.5)  %


 


Immature Gran #  0.10 H   (0.00-0.04)  X 10*3/uL


 


BUN   27.3 H  (9.0-27.0)  mg/dL


 


Creatinine   3.8 H  (0.6-1.5)  mg/dL


 


Est GFR (CKD-EPI)   12 L  (>=60)   


 


BUN/Creatinine Ratio   7.18 L  (12.00-20.00)  Ratio


 


Calcium   8.4 L  (8.7-10.3)  mg/dL


 


Alkaline Phosphatase   181 H  ()  U/L


 


Total Protein   5.3 L  (6.2-8.2)  g/dL


 


Albumin   3.1 L  (3.8-4.9)  g/dL


 


Albumin/Globulin Ratio   1.41 L  (1.60-3.17)  Ratio














Assessment and Plan


Assessment: 





   Covid 19 infection with generalized fatigue and malaise/diarrhea


   Anemia of chronic kidney disease, rule out other causes


   History of COPD with tracheobronchitis


   End-stage renal disease on hemodialysis


   Lactic acidosis on admission secondary to dehydration


   Hypokalemia on admission


   History of hypertension


Plan: 





Continue with anemia workup, iron, folate, B12 and occult blood in stool


Monitor hemoglobin


Protonix and Tums


Nephrology consult is appreciated


fatique due to viral infection and other chronic medical problems


Labs and medication were reviewed..  Continue same treatment.  Continue with 

symptomatic treatment.  Resume home medication.  Monitor labs and vitals.  DVT 

and GI prophylaxis.  Further recommendations as per clinical course of the 

patient


DVT prophylaxis: Subcutaneous heparin


GI Prophylaxis: Ppi


PT/OT: Pending


Prognosis is guarded

## 2023-12-10 NOTE — P.PN
Subjective








   78-year-old patient with past medical history significant for end-stage renal

disease on hemodialysis, history of anemia from chronic kidney disease, 

hyperlipidemia, hypertension, osteoarthritis, obesity, presents to the emergency

department with complains of generalized weakness, fatigue.  Patient states she 

has not been feeling well for the last 1 week with generalized weakness, cough, 

congestion, nausea and diarrhea.  Patient stated that she had severe limitation 

with activity of daily living secondary to profound weakness.


   Workup initiated in ER including serum chemistry which showed sodium of 135 

potassium of 3 chloride of 94 BU and 27 creatinine 3.78 lactate of 4.5 AST 38 

AST 26 troponin within normal limits


   CBC obtained showed WBC 4.2 hemoglobin 9.8 hematocrit 29 platelet count 194


   Initial lactate obtained 4.5 follow-up lactate 2.2 and 1.6


   While in ER patient was given 2 L of fluid bolus followed by potassium 

supplementation, patient was admitted to medical floor for management of 

profound weakness from Covid 19 with consultations from nephrology for 

maintenance of hemodialysis


   Patient states she went to an urgent care a few days ago and was given 

cefuroxime for lower extremity cellulitis, on evaluation patient does have 

recent dermatitis vasculitis noted patient had itching and rash from sulfa drugs

ALLERGY list updated





   12/8/2023: Patient seen and evaluated bedside, patient does complain of cough

with productive phlegm, remains on room air, plan for hemodialysis today, CRP 

within normal limits, sodium 134 magnesium 1.8 showed to CVA hemodialysis today 

he had CBC reviewed WBC within normal limits M 11 9.7 platelet count within 

normal limits








12/9/2023 


patient complains from generalized weakness and fatigue


No respiratory symptoms


Patient hemoglobin 8.7


Also patient complains from some stomach upset, Tums and Pepcid or Protonix is 

added


Patient continue with hemodialysis for ESRD  Nephrologist





12/10/2023 


Patient was complaining of from some chest pain or dyspnea today "I feel I'm 

going to die"


We will order serum troponin which were unremarkable, EKG showing normal sinus 

rhythm with no significant ST-T changes


D-dimer is elevated at 0.98 in the context of her Covid infection.  Therefore 

going to order CT of the chest to rule out PE and tomorrow she will go for 

hemodialysis


Discussed with the staff


After medicated to the nephrology team


Patient remains on oral Zithromax.


Hemoglobin 8.7) monitoring.  Vital stable








Review of systems


CONSTITUTIONAL: No fever, no malaise, no fatigue. 


HEENT: No recent visual problems or hearing problems. Denied any sore throat. 


CARDIOVASCULAR: No  orthopnea, PND, no palpitations, no syncope. 


PULMONARY: No shortness of breath, no cough, no hemoptysis. 


GASTROINTESTINAL: No diarrhea, no nausea, no vomiting, no abdominal pain. 

Normoactive bowel sounds. 


                               Active Medications











Generic Name Dose Route Start Last Admin





  Trade Name Freq  PRN Reason Stop Dose Admin


 


Acetaminophen  650 mg  12/07/23 09:17  12/08/23 20:38





  Acetaminophen Tab 325 Mg Tab  PO   650 mg





  Q4HR PRN   Administration





  Fever>101  


 


Albuterol Sulfate  2 puff  12/07/23 08:00 





  Albuterol Hfa Inhaler  INHALATION  





  RT-QID PRN  





  Shortness Of Breath  


 


Ascorbic Acid  500 mg  12/06/23 17:15  12/10/23 09:00





  Ascorbic Acid 500 Mg Tab  PO   500 mg





  DAILY LUCA   Administration


 


Atorvastatin Calcium  20 mg  12/07/23 21:00  12/10/23 20:51





  Atorvastatin 20 Mg Tab  PO   20 mg





  HS LUCA   Administration


 


Azithromycin  500 mg  12/08/23 12:00  12/10/23 09:00





  Azithromycin 500 Mg Tab  PO  12/12/23 09:01  500 mg





  DAILY LUCA   Administration





  Protocol  


 


Budesonide/Formoterol Fumarate  2 puff  12/07/23 05:06 





  Symbicort 160-4.5 Mcg Inhaler  INHALATION  





  RT-BID PRN  





  Shortness Of Breath  


 


Calcium Acetate  1,334 mg  12/07/23 07:30  12/10/23 17:54





  Calcium Acetate 667 Mg Tab  PO   1,334 mg





  AC-TID LUCA   Administration


 


Calcium Carbonate/Glycine  500 mg  12/09/23 12:37  12/10/23 20:51





  Calcium Carbonate 500 Mg Chewable  PO   500 mg





  TID PRN   Administration





  Heartburn  


 


Cyanocobalamin  1,000 mcg  12/10/23 16:00  12/10/23 17:47





  Cyanocobalamin 1,000 Mcg/Ml 1 Ml Vial  IM  12/13/23 16:01  1,000 mcg





  DAILY LUCA   Administration


 


Darbepoetin Joaquin  40 mcg  12/10/23 12:15  12/10/23 13:56





  Darbepoetin Joaquin 40 Mcg/0.4 Ml Syringe  SQ   40 mcg





  Q7D LUCA   Administration


 


Heparin Sodium (Porcine)  5,000 unit  12/07/23 16:00  12/10/23 20:51





  Heparin Sodium,Porcine 5,000 Unit/Ml 1 Ml Vial  SQ   5,000 unit





  Q8HR LUCA   Administration


 


Hydralazine HCl  25 mg  12/07/23 09:00  12/10/23 20:41





  Hydralazine Hcl 25 Mg Tab  PO   Not Given





  BID LUCA  


 


Hydromorphone HCl  1 mg  12/06/23 17:07  12/07/23 04:18





  Hydromorphone 1 Mg/Ml 1 Ml Syringe  IVP   1 mg





  Q3HR PRN   Administration





  Severe Pain (Scale 7 to 10)  


 


Levothyroxine Sodium  112 mcg  12/07/23 07:30  12/10/23 07:00





  Levothyroxine 112 Mcg Tab  PO   112 mcg





  AC-BRKFST LUCA   Administration


 


Lisinopril  20 mg  12/09/23 21:00  12/10/23 20:41





  Lisinopril 20 Mg Tab  PO   Not Given





  BID LUCA  


 


Metoprolol Tartrate  50 mg  12/07/23 09:00  12/10/23 20:41





  Metoprolol Tartrate 50 Mg Tab  PO   Not Given





  BID LUCA  


 


Multivit/Ca Carb/B Cmplx/FA/Prenat  1 each  12/07/23 09:00  12/10/23 09:00





  Folic Acid-Vit B Complex-Vit C 1 Cap  PO   1 each





  DAILY LUCA   Administration


 


Naloxone HCl  0.2 mg  12/06/23 17:07 





  Naloxone 0.4 Mg/Ml 1 Ml Vial  IV  





  Q2M PRN  





  Opioid Reversal  


 


Ondansetron HCl  4 mg  12/06/23 17:07  12/10/23 08:59





  Ondansetron 4 Mg/2 Ml Vial  IVP   4 mg





  Q8HR PRN   Administration





  Nausea And Vomiting  


 


Oxycodone/Acetaminophen  1 each  12/07/23 09:00  12/10/23 22:31





  Oxycodone-Apap 10-325mg 1 Each Tab  PO   1 each





  TID PRN   Administration





  Pain  


 


Pantoprazole Sodium  40 mg  12/10/23 07:30  12/10/23 07:00





  Pantoprazole 40 Mg Tablet  PO   40 mg





  AC-BRKFST LUCA   Administration


 


Zinc Sulfate  220 mg  12/06/23 17:15  12/10/23 09:00





  Zinc Sulfate 220 Mg Cap  PO   220 mg





  DAILY LUCA   Administration














Objective





- Vital Signs


Vital signs: 


                                   Vital Signs











Temp  98.5 F   12/10/23 18:58


 


Pulse  71   12/10/23 18:58


 


Resp  18   12/10/23 18:58


 


BP  102/51   12/10/23 18:58


 


Pulse Ox  93 L  12/10/23 18:58


 


FiO2      








                                 Intake & Output











 12/10/23 12/10/23 12/11/23





 06:59 18:59 06:59


 


Other:   


 


  # Voids 2 3 














- Exam





-GENERAL: The patient is alert and oriented x3, not in any acute distress. Well 

developed, well nourished.  Generally weak


HEENT: Pupils are round and equally reacting to light. EOMI. No scleral icterus.

No conjunctival pallor. Normocephalic, atraumatic. No pharyngeal erythema. No 

thyromegaly. 


CARDIOVASCULAR: S1 and S2 present. No murmurs, rubs, or gallops. 


PULMONARY: Chest is clear to auscultation, no wheezing , no crackles. 


ABDOMEN: Soft, nontender, nondistended, normoactive bowel sounds. No palpable 

organomegaly. 


MUSCULOSKELETAL: No joint swelling or deformity. 


EXTREMITIES: No cyanosis, clubbing, or pedal edema. 


NEUROLOGICAL: Gross neurological examination did not reveal any focal deficits. 


SKIN: No rashes. no petechiae.





- Labs


CBC & Chem 7: 


                                 12/10/23 07:47





                                 12/10/23 07:47


Labs: 


                  Abnormal Lab Results - Last 24 Hours (Table)











  12/09/23 12/10/23 12/10/23 Range/Units





  06:21 07:47 07:47 


 


RBC   3.08 L   (3.80-5.40)  m/uL


 


Hgb   10.3 L   (11.4-16.0)  gm/dL


 


Hct   31.6 L   (34.0-46.0)  %


 


MCV   102.8 H   (80.0-100.0)  fL


 


D-Dimer     (<0.60)  mg/L FEU


 


Sodium    133 L  (137-145)  mmol/L


 


Chloride    94 L  ()  mmol/L


 


BUN    43 H  (7-17)  mg/dL


 


Creatinine    5.12 H  (0.52-1.04)  mg/dL


 


TIBC  218 L    (228-460)  UG/DL


 


Transferrin  156.0 L    (204.0-354.0)  mg/dL


 


Ferritin  1306.0 H    (10.0-291.0)  ng/mL


 


Alkaline Phosphatase    210 H  ()  U/L


 


Vitamin B12    191.0 L  (200.0-944.0)  pg/mL














  12/10/23 Range/Units





  16:07 


 


RBC   (3.80-5.40)  m/uL


 


Hgb   (11.4-16.0)  gm/dL


 


Hct   (34.0-46.0)  %


 


MCV   (80.0-100.0)  fL


 


D-Dimer  0.98 H  (<0.60)  mg/L FEU


 


Sodium   (137-145)  mmol/L


 


Chloride   ()  mmol/L


 


BUN   (7-17)  mg/dL


 


Creatinine   (0.52-1.04)  mg/dL


 


TIBC   (228-460)  UG/DL


 


Transferrin   (204.0-354.0)  mg/dL


 


Ferritin   (10.0-291.0)  ng/mL


 


Alkaline Phosphatase   ()  U/L


 


Vitamin B12   (200.0-944.0)  pg/mL














Assessment and Plan


Assessment: 





   Covid 19 infection with generalized fatigue and malaise/diarrhea


   Anemia of chronic kidney disease, rule out other causes


   History of COPD with tracheobronchitis


   End-stage renal disease on hemodialysis


   Lactic acidosis on admission secondary to dehydration


   Hypokalemia on admission


   History of hypertension


Plan: 





Continue with anemia workup, iron, folate, B12 and occult blood in stool


Check CTA of the chest rule out PE


Monitor hemoglobin


Protonix and Tums


Nephrology consult is appreciated


fatique due to viral infection and other chronic medical problems


Labs and medication were reviewed..  Continue same treatment.  Continue with 

symptomatic treatment.  Resume home medication.  Monitor labs and vitals.  DVT 

and GI prophylaxis.  Further recommendations as per clinical course of the 

patient


DVT prophylaxis: Subcutaneous heparin


GI Prophylaxis: Ppi


PT/OT: Pending


Prognosis is guarded

## 2023-12-11 VITALS
RESPIRATION RATE: 20 BRPM | DIASTOLIC BLOOD PRESSURE: 56 MMHG | TEMPERATURE: 98 F | HEART RATE: 63 BPM | SYSTOLIC BLOOD PRESSURE: 121 MMHG

## 2023-12-11 RX ADMIN — HEPARIN SODIUM SCH UNIT: 5000 INJECTION, SOLUTION INTRAVENOUS; SUBCUTANEOUS at 08:11

## 2023-12-11 RX ADMIN — LEVOTHYROXINE SODIUM SCH MCG: 0.11 TABLET ORAL at 06:41

## 2023-12-11 RX ADMIN — OXYCODONE HYDROCHLORIDE AND ACETAMINOPHEN PRN EACH: 10; 325 TABLET ORAL at 16:01

## 2023-12-11 RX ADMIN — METOPROLOL TARTRATE SCH MG: 50 TABLET, FILM COATED ORAL at 08:11

## 2023-12-11 RX ADMIN — OXYCODONE HYDROCHLORIDE AND ACETAMINOPHEN SCH MG: 500 TABLET ORAL at 08:11

## 2023-12-11 RX ADMIN — Medication SCH: at 12:09

## 2023-12-11 RX ADMIN — OXYCODONE HYDROCHLORIDE AND ACETAMINOPHEN PRN EACH: 10; 325 TABLET ORAL at 06:40

## 2023-12-11 RX ADMIN — Medication SCH EACH: at 08:11

## 2023-12-11 RX ADMIN — Medication SCH MG: at 06:40

## 2023-12-11 RX ADMIN — CYANOCOBALAMIN SCH: 1000 INJECTION, SOLUTION INTRAMUSCULAR; SUBCUTANEOUS at 08:11

## 2023-12-11 RX ADMIN — Medication SCH: at 12:00

## 2023-12-11 RX ADMIN — Medication SCH MG: at 12:07

## 2023-12-11 RX ADMIN — PANTOPRAZOLE SODIUM SCH MG: 40 TABLET, DELAYED RELEASE ORAL at 06:41

## 2023-12-11 RX ADMIN — Medication SCH MG: at 08:11

## 2023-12-11 RX ADMIN — AZITHROMYCIN SCH MG: 500 TABLET, FILM COATED ORAL at 08:11

## 2023-12-11 NOTE — CT
EXAM:

  CT Angiography Chest With Intravenous Contrast

 

CLINICAL HISTORY:

   Elevated DDimer

 

TECHNIQUE:

  Axial computed tomographic angiography images of the chest with 

intravenous contrast.  CTDI is 6 51.2 mGy and DLP is 789.3 mGy-cm.  This 

CT exam was performed using one or more of the following dose reduction 

techniques: automated exposure control, adjustment of the mA and/or kV 

according to patient size, and/or use of iterative reconstruction 

technique.

  MIP reconstructed images were created and reviewed.

 

COMPARISON:

  No relevant prior studies available.

 

FINDINGS:

  Pulmonary arteries:  Unremarkable.  No pulmonary embolism.

  Aorta:  No acute findings.  No thoracic aortic aneurysm.

  Lungs:  Unremarkable.  No mass.  No consolidation.

  Pleural space:  Unremarkable.  No significant effusion.  No 

pneumothorax.

  Heart:  Unremarkable.  No cardiomegaly.  No significant pericardial 

effusion.  No evidence of RV dysfunction.

  Bones/joints:  No acute fracture.  No dislocation.

  Soft tissues:  Unremarkable.

  Lymph nodes:  Unremarkable.  No enlarged lymph nodes.

 

IMPRESSION:     

 No evidence of pulmonary emboli or acute cardiopulmonary process.

## 2023-12-11 NOTE — P.PN
Subjective





Patient is seen in follow-up for end-stage renal disease.  She is maintained on 

hemodialysis on Monday Wednesday Friday schedule.  Resting in bed.  Denies chest

pain or shortness of breath at this time.  Blood pressure controlled.  Developed

episode of chest pain last night and underwent CTA which showed no evidence of 

PE.





Vital signs are stable.


General: No acute distress.


HEENT: Head exam is unremarkable.


LUNGS: No audible rhonchi or wheezes.


HEART: Rate and Rhythm are regular. 


ABDOMEN: Nontender.


EXTREMITITES: No edema.





Objective





- Vital Signs


Vital signs: 


                                   Vital Signs











Temp  98.4 F   12/11/23 07:46


 


Pulse  76   12/11/23 08:11


 


Resp  16   12/11/23 08:11


 


BP  122/69   12/11/23 07:46


 


Pulse Ox  95   12/11/23 07:46


 


FiO2      








                                 Intake & Output











 12/10/23 12/11/23 12/11/23





 18:59 06:59 18:59


 


Output Total  1600 


 


Balance  -1600 


 


Output:   


 


  Urine  1600 


 


Other:   


 


  # Voids 3  














- Labs


CBC & Chem 7: 


                                 12/10/23 07:47





                                 12/10/23 07:47


Labs: 


                  Abnormal Lab Results - Last 24 Hours (Table)











  12/10/23 12/10/23 Range/Units





  07:47 16:07 


 


D-Dimer   0.98 H  (<0.60)  mg/L FEU


 


Vitamin B12  191.0 L   (200.0-944.0)  pg/mL














Assessment and Plan


Plan: 





Assessment:


1.  End-stage renal disease maintained on hemodialysis Monday Wednesday Friday 

schedule.


2.  Acute COVID-19 infection.  No PE noted on CTA.


3.  Hypertension with chronic kidney disease.  


4.  Anemia of chronic kidney disease.  Iron replete.  On Aranesp.


5.  Chronic kidney disease mineral bone disease maintained on PhosLo.





Plan:


Hemodialysis today.


Hold hydralazine and amlodipine for systolic blood pressure less than 120.

## 2023-12-12 NOTE — P.DS
Providers


Date of admission: 


12/06/23 17:23





Attending physician: 


Wilfredo Thomas





Consults: 





                                        





12/06/23 17:07


Consult Physician Urgent 


   Consulting Provider: Emilia Estrada


   Consult Reason/Comments: ESRD Hemodialysis M-W-F


   Do you want consulting provider notified?: Yes





12/07/23 09:26


Consult Physician Routine 


   Consulting Provider: Dalia Alfaro


   Consult Reason/Comments: Covid viral pneumonia, sepsis on admission


   Do you want consulting provider notified?: Yes











Primary care physician: 


Esau Ashraf





[facility] Course: 





Diagnoses:


Covid 19 infection with generalized fatigue and malaise/diarrhea


Mildly elevated d-dimer with negative CTPA for pulmonary embolism


End-stage renal disease maintained on hemodialysis Monday Wednesday Friday 

schedule.


Anemia of chronic kidney disease,


History of COPD with tracheobronchitis


Lactic acidosis on admission secondary to dehydration.  Improved and back to 

baseline at 1.6


Hypokalemia on admission, resolved


History of hypertension








Hospital course:


78-year-old patient with past medical history significant for end-stage renal 

disease on hemodialysis, history of anemia from chronic kidney disease, 

hyperlipidemia, hypertension, osteoarthritis, obesity, presents to the emergency

department with complains of generalized weakness, fatigue.  Patient states she 

has not been feeling well for the last 1 week with generalized weakness, cough, 

congestion, nausea and diarrhea.  Patient stated that she had severe limitation 

with activity of daily living secondary to profound weakness.  Patient found to 

have fatigue secondary to viral infection with COVID-19.  Patient has no 

pneumonia or hypoxia.  D-dimer was mildly elevated 0.98 and CTA of the chest was

negative for pulmonary embolism and showed no consolidation in the pulmonary 

parenchyma to suspect Pneumonia.  Patient was with no dyspnea or chest pain and 

discharge and she was saturating 98% on room air.  Afebrile.


On the day of discharge she denies any other new complaints.  She is eager to go

home as well.


And was cleared for discharge by nephrologist


Problems and management plan were discussed with the patient and he verbalized 

understanding and acceptance


Patient was found stable and can be discharged home in guarded prognosis however

he needs follow-up as an outpatient. Patient was instructed to follow up with 

PCP Dr. Ashraf within one week and patient agrees


Patient was instructed to follow up with nephrologist Dr. Estrada in one week and 

she agrees





Physical exam


Gen: patient is a AAOx3, no distress


CVS: S1-S2, RRR, no murmur


Lungs: B/L CTA, no wheezing


Abdomen: soft, no distention, no tenderness, positive bowel sounds


Extremity: no leg edema or induration





Time spent more than 35 minutes








Patient Condition at Discharge: Good





Plan - Discharge Summary


Discharge Rx Participant: Yes


New Discharge Prescriptions: 


New


   Zinc Sulfate [Orazinc] 220 mg PO DAILY #30 cap


   Omeprazole [PriLOSEC] 20 mg PO AC-BID #60 cap


   Acetaminophen Tab [Tylenol] 650 mg PO Q4HR PRN  tab


     PRN Reason: Fever>101


   Ascorbic Acid [Vitamin C] 500 mg PO DAILY #30 tab


   Cyanocobalamin [Vitamin B-12] 1,000 mcg PO DAILY #60 tablet





Continue


   Levothyroxine Sodium [Synthroid] 112 mcg PO AC-BRKFST


   Rosuvastatin Calcium [Crestor] 10 mg PO HS


   Metoprolol Tartrate [Lopressor] 50 mg PO BID


   Calcium Acetate 1,334 mg PO AC-TID


   oxyCODONE-APAP 10-325MG [Percocet  mg] 1 tab PO TID PRN


     PRN Reason: Pain


   Albuterol Inhaler [Ventolin Hfa Inhaler] 2 puff INHALATION RT-QID PRN


     PRN Reason: Shortness Of Breath


   Albuterol Nebulized [Ventolin Nebulized] 2.5 mg INHALATION RT-QID PRN


     PRN Reason: Shortness Of Breath


   Vit B Comp No.3/Folic/C/Biotin [Bella-Hyacinth Rx Tablet] 1 tab PO DAILY


   Budesonide/Formoterol Fumarate [Symbicort 160-4.5 Mcg Inhaler] 2 puff 

INHALATION RT-BID PRN


     PRN Reason: Shortness Of Breath


   Ipratropium Nebulized [Atrovent Nebulized 0.2 MG/ML] 0.5 mg INHALATION RT-TID

PRN


     PRN Reason: Shortness Of Breath


   lisinopriL 40 mg PO DAILY


   hydrALAZINE HCL [Apresoline] 25 mg PO BID





Discontinued


   Furosemide [Lasix] 20 mg PO BID


   amLODIPine [Norvasc] 10 mg PO DAILY


Discharge Medication List





Levothyroxine Sodium [Synthroid] 112 mcg PO AC-BRKFST 02/04/20 [History]


Rosuvastatin Calcium [Crestor] 10 mg PO HS 02/04/20 [History]


Calcium Acetate 1,334 mg PO AC-TID 11/09/20 [History]


Metoprolol Tartrate [Lopressor] 50 mg PO BID 11/09/20 [History]


oxyCODONE-APAP 10-325MG [Percocet  mg] 1 tab PO TID PRN 01/07/23 [History]


Albuterol Inhaler [Ventolin Hfa Inhaler] 2 puff INHALATION RT-QID PRN 05/04/23 

[History]


Albuterol Nebulized [Ventolin Nebulized] 2.5 mg INHALATION RT-QID PRN 05/04/23 

[History]


Vit B Comp No.3/Folic/C/Biotin [Bella-Hyacinth Rx Tablet] 1 tab PO DAILY 10/31/23 

[History]


Budesonide/Formoterol Fumarate [Symbicort 160-4.5 Mcg Inhaler] 2 puff INHALATION

RT-BID PRN 12/06/23 [History]


Ipratropium Nebulized [Atrovent Nebulized 0.2 MG/ML] 0.5 mg INHALATION RT-TID 

PRN 12/06/23 [History]


hydrALAZINE HCL [Apresoline] 25 mg PO BID 12/06/23 [History]


lisinopriL 40 mg PO DAILY 12/06/23 [History]


Acetaminophen Tab [Tylenol] 650 mg PO Q4HR PRN  tab 12/11/23 [Rx]


Ascorbic Acid [Vitamin C] 500 mg PO DAILY #30 tab 12/11/23 [Rx]


Cyanocobalamin [Vitamin B-12] 1,000 mcg PO DAILY #60 tablet 12/11/23 [Rx]


Omeprazole [PriLOSEC] 20 mg PO AC-BID #60 cap 12/11/23 [Rx]


Zinc Sulfate [Orazinc] 220 mg PO DAILY #30 cap 12/11/23 [Rx]








Follow up Appointment(s)/Referral(s): 


Emilia Estrada MD [STAFF PHYSICIAN] - 1 Week (Follow-up with dialysis center)


Esau Ashraf DO [Primary Care Provider] - 12/19/23 12:40 pm


Activity/Diet/Wound Care/Special Instructions: 


heart healthy diet 





activity is restricted till you see your doctor 


Discharge Disposition: HOME WITH HOME HEALTH SERVICES

## 2024-05-21 ENCOUNTER — HOSPITAL ENCOUNTER (OUTPATIENT)
Dept: HOSPITAL 47 - RADMAMWWP | Age: 79
Discharge: HOME | End: 2024-05-21
Attending: FAMILY MEDICINE
Payer: MEDICARE

## 2024-05-21 DIAGNOSIS — Z78.0: ICD-10-CM

## 2024-05-21 DIAGNOSIS — Z12.31: Primary | ICD-10-CM

## 2024-05-21 PROCEDURE — 77063 BREAST TOMOSYNTHESIS BI: CPT

## 2024-05-21 PROCEDURE — 77067 SCR MAMMO BI INCL CAD: CPT

## 2024-05-28 NOTE — MM
Reason for Exam: Screening  (asymptomatic). 

Last mammogram was performed 1 year(s) and 6 month(s) ago. 





Patient History: 

Menarche at age 15. Patient has no children. Left ovary removed at age 49. Right ovary removed at

age 49. Hysterectomy at age 49. Postmenopausal. 





Risk Values: 

Keya 5 year model risk: 1.8%.

NCI Lifetime model risk: 3.4%.





Prior Study Comparison: 

1/31/2020 Bilateral Screening Mammogram, [facility]. 4/20/2021 Bilateral Screening Mammogram, [facility].

11/22/2022 Bilateral MG 3D screening mammo w/cad, [facility]. 





Tissue Density: 

There are scattered areas of fibroglandular density.





Findings: 

Analyzed By CAD. 

There is no suspicious group of microcalcifications or new suspicious mass in either breast. Benign

appearing calcifications. 





Overall Assessment: Benign, BI-RAD 2





Management: 

Screening Mammogram of both breasts in 1 year.

.



Patient should continue monthly self-breast exams.  A clinical breast exam by your physician is

recommended on an annual basis.

This exam should not preclude additional follow-up of suspicious palpable abnormalities.



Note on Keya scores and lifetime risk:

1. A Keya score greater than 3% is considered moderate risk. If this is the case, consider

specialist referral to assess eligibility for a risk reducing agent.

2. If overall lifetime risk for the development of breast cancer is 20% or higher, the patient may

qualify for future screening with alternating mammogram and breast MRI.



Electronically signed and approved by: Anjel Vivar M.D. Radiologis

## 2024-07-15 ENCOUNTER — HOSPITAL ENCOUNTER (INPATIENT)
Dept: HOSPITAL 47 - EC | Age: 79
LOS: 4 days | Discharge: HOME | DRG: 291 | End: 2024-07-19
Attending: HOSPITALIST | Admitting: HOSPITALIST
Payer: MEDICARE

## 2024-07-15 VITALS — BODY MASS INDEX: 39.8 KG/M2

## 2024-07-15 DIAGNOSIS — Z85.42: ICD-10-CM

## 2024-07-15 DIAGNOSIS — Z98.84: ICD-10-CM

## 2024-07-15 DIAGNOSIS — J44.89: ICD-10-CM

## 2024-07-15 DIAGNOSIS — I50.33: ICD-10-CM

## 2024-07-15 DIAGNOSIS — Z88.6: ICD-10-CM

## 2024-07-15 DIAGNOSIS — D53.1: ICD-10-CM

## 2024-07-15 DIAGNOSIS — E11.22: ICD-10-CM

## 2024-07-15 DIAGNOSIS — Z88.1: ICD-10-CM

## 2024-07-15 DIAGNOSIS — E78.5: ICD-10-CM

## 2024-07-15 DIAGNOSIS — I49.1: ICD-10-CM

## 2024-07-15 DIAGNOSIS — E03.9: ICD-10-CM

## 2024-07-15 DIAGNOSIS — Z79.890: ICD-10-CM

## 2024-07-15 DIAGNOSIS — Z86.73: ICD-10-CM

## 2024-07-15 DIAGNOSIS — E66.01: ICD-10-CM

## 2024-07-15 DIAGNOSIS — Z87.891: ICD-10-CM

## 2024-07-15 DIAGNOSIS — Z96.653: ICD-10-CM

## 2024-07-15 DIAGNOSIS — N18.6: ICD-10-CM

## 2024-07-15 DIAGNOSIS — Z88.8: ICD-10-CM

## 2024-07-15 DIAGNOSIS — Z79.899: ICD-10-CM

## 2024-07-15 DIAGNOSIS — I25.2: ICD-10-CM

## 2024-07-15 DIAGNOSIS — I49.3: ICD-10-CM

## 2024-07-15 DIAGNOSIS — D63.1: ICD-10-CM

## 2024-07-15 DIAGNOSIS — I13.2: Primary | ICD-10-CM

## 2024-07-15 DIAGNOSIS — M89.8X9: ICD-10-CM

## 2024-07-15 DIAGNOSIS — E87.6: ICD-10-CM

## 2024-07-15 DIAGNOSIS — Z99.2: ICD-10-CM

## 2024-07-15 DIAGNOSIS — I08.3: ICD-10-CM

## 2024-07-15 LAB
ALBUMIN SERPL-MCNC: 3.5 G/DL (ref 3.5–5)
ALP SERPL-CCNC: 287 U/L (ref 38–126)
ALT SERPL-CCNC: 20 U/L (ref 4–34)
ANION GAP SERPL CALC-SCNC: 6 MMOL/L
APTT BLD: 22.9 SEC (ref 22–30)
AST SERPL-CCNC: 36 U/L (ref 14–36)
BASOPHILS # BLD AUTO: 0 K/UL (ref 0–0.2)
BASOPHILS NFR BLD AUTO: 0 %
BUN SERPL-SCNC: 24 MG/DL (ref 7–17)
CALCIUM SPEC-MCNC: 8.5 MG/DL (ref 8.4–10.2)
CHLORIDE SERPL-SCNC: 101 MMOL/L (ref 98–107)
CO2 SERPL-SCNC: 31 MMOL/L (ref 22–30)
EOSINOPHIL # BLD AUTO: 0.1 K/UL (ref 0–0.7)
EOSINOPHIL NFR BLD AUTO: 2 %
ERYTHROCYTE [DISTWIDTH] IN BLOOD BY AUTOMATED COUNT: 3.02 M/UL (ref 3.8–5.4)
ERYTHROCYTE [DISTWIDTH] IN BLOOD: 13.7 % (ref 11.5–15.5)
GLUCOSE SERPL-MCNC: 103 MG/DL (ref 74–99)
HCT VFR BLD AUTO: 31.3 % (ref 34–46)
HGB BLD-MCNC: 9.9 GM/DL (ref 11.4–16)
INR PPP: 0.9 (ref ?–1.2)
LYMPHOCYTES # SPEC AUTO: 1.3 K/UL (ref 1–4.8)
LYMPHOCYTES NFR SPEC AUTO: 24 %
MAGNESIUM SPEC-SCNC: 1.9 MG/DL (ref 1.6–2.3)
MCH RBC QN AUTO: 32.6 PG (ref 25–35)
MCHC RBC AUTO-ENTMCNC: 31.5 G/DL (ref 31–37)
MCV RBC AUTO: 103.5 FL (ref 80–100)
MONOCYTES # BLD AUTO: 0.3 K/UL (ref 0–1)
MONOCYTES NFR BLD AUTO: 6 %
NEUTROPHILS # BLD AUTO: 3.5 K/UL (ref 1.3–7.7)
NEUTROPHILS NFR BLD AUTO: 65 %
NT-PROBNP SERPL-MCNC: (no result) PG/ML
PLATELET # BLD AUTO: 200 K/UL (ref 150–450)
POTASSIUM SERPL-SCNC: 3.3 MMOL/L (ref 3.5–5.1)
PROT SERPL-MCNC: 6.3 G/DL (ref 6.3–8.2)
PT BLD: 10.1 SEC (ref 10–12.5)
SODIUM SERPL-SCNC: 138 MMOL/L (ref 137–145)
WBC # BLD AUTO: 5.3 K/UL (ref 3.8–10.6)

## 2024-07-15 PROCEDURE — 83735 ASSAY OF MAGNESIUM: CPT

## 2024-07-15 PROCEDURE — 83540 ASSAY OF IRON: CPT

## 2024-07-15 PROCEDURE — 78452 HT MUSCLE IMAGE SPECT MULT: CPT

## 2024-07-15 PROCEDURE — 83550 IRON BINDING TEST: CPT

## 2024-07-15 PROCEDURE — 96374 THER/PROPH/DIAG INJ IV PUSH: CPT

## 2024-07-15 PROCEDURE — 99285 EMERGENCY DEPT VISIT HI MDM: CPT

## 2024-07-15 PROCEDURE — 85025 COMPLETE CBC W/AUTO DIFF WBC: CPT

## 2024-07-15 PROCEDURE — 82728 ASSAY OF FERRITIN: CPT

## 2024-07-15 PROCEDURE — 83605 ASSAY OF LACTIC ACID: CPT

## 2024-07-15 PROCEDURE — 82607 VITAMIN B-12: CPT

## 2024-07-15 PROCEDURE — 85610 PROTHROMBIN TIME: CPT

## 2024-07-15 PROCEDURE — 83880 ASSAY OF NATRIURETIC PEPTIDE: CPT

## 2024-07-15 PROCEDURE — 71046 X-RAY EXAM CHEST 2 VIEWS: CPT

## 2024-07-15 PROCEDURE — 93306 TTE W/DOPPLER COMPLETE: CPT

## 2024-07-15 PROCEDURE — 90935 HEMODIALYSIS ONE EVALUATION: CPT

## 2024-07-15 PROCEDURE — 80053 COMPREHEN METABOLIC PANEL: CPT

## 2024-07-15 PROCEDURE — 80048 BASIC METABOLIC PNL TOTAL CA: CPT

## 2024-07-15 PROCEDURE — 93017 CV STRESS TEST TRACING ONLY: CPT

## 2024-07-15 PROCEDURE — 85379 FIBRIN DEGRADATION QUANT: CPT

## 2024-07-15 PROCEDURE — 85730 THROMBOPLASTIN TIME PARTIAL: CPT

## 2024-07-15 PROCEDURE — 93005 ELECTROCARDIOGRAM TRACING: CPT

## 2024-07-15 PROCEDURE — 36415 COLL VENOUS BLD VENIPUNCTURE: CPT

## 2024-07-15 PROCEDURE — 84484 ASSAY OF TROPONIN QUANT: CPT

## 2024-07-15 PROCEDURE — 94760 N-INVAS EAR/PLS OXIMETRY 1: CPT

## 2024-07-15 PROCEDURE — 94640 AIRWAY INHALATION TREATMENT: CPT

## 2024-07-15 PROCEDURE — 82746 ASSAY OF FOLIC ACID SERUM: CPT

## 2024-07-15 RX ADMIN — IPRATROPIUM BROMIDE AND ALBUTEROL SULFATE STA ML: .5; 3 SOLUTION RESPIRATORY (INHALATION) at 16:18

## 2024-07-15 RX ADMIN — NITROGLYCERIN SCH: 20 OINTMENT TOPICAL at 19:38

## 2024-07-15 RX ADMIN — SODIUM CHLORIDE, PRESERVATIVE FREE SCH ML: 5 INJECTION INTRAVENOUS at 20:17

## 2024-07-15 RX ADMIN — METOPROLOL TARTRATE SCH MG: 50 TABLET, FILM COATED ORAL at 20:17

## 2024-07-15 RX ADMIN — Medication SCH EACH: at 20:16

## 2024-07-15 RX ADMIN — FUROSEMIDE SCH MG: 10 INJECTION, SOLUTION INTRAMUSCULAR; INTRAVENOUS at 19:01

## 2024-07-15 RX ADMIN — ASPIRIN 325 MG ORAL TABLET STA MG: 325 PILL ORAL at 19:01

## 2024-07-15 RX ADMIN — OXYCODONE HYDROCHLORIDE AND ACETAMINOPHEN PRN EACH: 10; 325 TABLET ORAL at 20:21

## 2024-07-15 RX ADMIN — ATORVASTATIN CALCIUM SCH MG: 20 TABLET, FILM COATED ORAL at 20:16

## 2024-07-15 NOTE — ED
General Adult HPI





- General


Chief complaint: Shortness of Breath


Stated complaint: Fluid buildup in lungs


Time Seen by Provider: 07/15/24 15:24


Source: patient, EMS, RN notes reviewed


Mode of arrival: EMS


Limitations: no limitations





- History of Present Illness


Initial comments: 





Patient is a pleasant 78-year-old female presenting to the emergency department 

with difficulty breathing.  Onset of symptoms was around a week ago.  Symptoms 

have progressed since that time.  Patient does have some leg swelling.  Patient 

did go to her doctor's office and had an x-ray done telling her that there was 

fluid on her lungs and to go to the emergency department.  Patient denies chest 

pain.  Occasional cough.  Patient does have history of COPD.





- Related Data


                                Home Medications











 Medication  Instructions  Recorded  Confirmed


 


Levothyroxine Sodium [Synthroid] 112 mcg PO AC-BRKFST 02/04/20 07/15/24


 


Rosuvastatin Calcium [Crestor] 10 mg PO HS 02/04/20 07/15/24


 


Calcium Acetate 1,334 mg PO TID-W/MEALS 11/09/20 07/15/24


 


Metoprolol Tartrate [Lopressor] 50 mg PO BID 11/09/20 07/15/24


 


oxyCODONE-APAP 10-325MG [Percocet 1 tab PO TID PRN 01/07/23 07/15/24





 mg]   


 


Albuterol Inhaler [Ventolin Hfa 2 puff INHALATION RT-QID PRN 05/04/23 07/15/24





Inhaler]   


 


Albuterol Nebulized [Ventolin 2.5 mg INHALATION RT-QID PRN 05/04/23 07/15/24





Nebulized]   


 


lisinopriL 40 mg PO DAILY 12/06/23 07/15/24


 


Cyanocobalamin (Vitamin B-12) 1,000 mcg PO DAILY 07/15/24 07/15/24





[Vitamin B-12]   


 


Folic Acid/Vit B Complex and C 0.8 mg PO HS 07/15/24 07/15/24





[Nephro-Hyacinth Tablet]   


 


Furosemide [Lasix] 80 mg PO DAILY 07/15/24 07/15/24


 


Lidocaine-Prilocaine Cream [Emla 1 applic TOPICAL AS DIRECTED PRN 07/15/24 

07/15/24





Cream 2.5%/2.5%]   


 


Omeprazole [PriLOSEC] 20 mg PO BID 07/15/24 07/15/24


 


amLODIPine [Norvasc] 10 mg PO DAILY 07/15/24 07/15/24


 


hydrALAZINE HCL [Apresoline] 100 mg PO BID 07/15/24 07/15/24








                                  Previous Rx's











 Medication  Instructions  Recorded


 


Acetaminophen Tab [Tylenol] 650 mg PO Q4HR PRN  tab 12/11/23


 


Ascorbic Acid [Vitamin C] 500 mg PO DAILY #30 tab 12/11/23











                                    Allergies











Allergy/AdvReac Type Severity Reaction Status Date / Time


 


cefuroxime Allergy  Rash/Hives Verified 07/15/24 17:44


 


naproxen [From Naprosyn] Allergy  Dyspnea Verified 07/15/24 17:44


 


blood thinner Allergy  "brain Uncoded 07/15/24 17:44





   bleed"  














Review of Systems


ROS Statement: 


Those systems with pertinent positive or pertinent negative responses have been 

documented in the HPI.





ROS Other: All systems not noted in ROS Statement are negative.


Constitutional: Denies: fever


Eyes: Denies: eye pain


ENT: Denies: ear pain


Respiratory: Reports: as per HPI, cough, dyspnea


Cardiovascular: Reports: edema.  Denies: chest pain


Endocrine: Denies: fatigue


Gastrointestinal: Denies: abdominal pain


Musculoskeletal: Denies: back pain


Skin: Denies: lesions





Past Medical History


Past Medical History: Asthma, Cancer, COPD, Dialysis, Hyperlipidemia, 

Hypertension, Myocardial Infarction (MI), Osteoarthritis (OA), Pneumonia, Renal 

Disease, Thyroid Disorder


Additional Past Medical History / Comment(s): Hx Uterine cancer 25 yrs ago, 

hemodialysis Mon, Wed and Fri, varicose veins., "mild heart attack"" "brain 

bleed twice" chronic diarrhea, hx "14 polyps", "chronic back pain", anemia, 

current edema rufina feet, diet control diabetic


Last Myocardial Infarction Date:: unknown


History of Any Multi-Drug Resistant Organisms: None Reported


Past Surgical History: Appendectomy, Bariatric Surgery, Cholecystectomy, 

Hysterectomy, Joint Replacement, Tonsillectomy


Additional Past Surgical History / Comment(s): gastric bypass, surgery after 

fall -fx rt leg and foot, rufina knee replacement, rufina cataracts, surgery x 2 or 

"brain bleed", fistula for dialysis, COLONOSCOPY,


Past Anesthesia/Blood Transfusion Reactions: Family History of Problems w/ 

Anesthesia


Additional Past Anesthesia/Blood Transfusion Reaction / Comment(s): MOTHER PONV


Past Psychological History: No Psychological Hx Reported


Smoking Status: Former smoker


Past Alcohol Use History: None Reported


Past Drug Use History: None Reported





- Past Family History


  ** Mother


Family Medical History: Cancer





  ** Father


Family Medical History: Diabetes Mellitus, Hypertension





  ** Sister(s)


Family Medical History: Cancer





General Exam


Limitations: no limitations


General appearance: alert, in no apparent distress


Head exam: Present: normocephalic


Eye exam: Present: normal appearance


Neck exam: Present: normal inspection


Respiratory exam: Present: decreased breath sounds


Cardiovascular Exam: Present: regular rate, normal rhythm


GI/Abdominal exam: Present: soft.  Absent: tenderness


Extremities exam: Present: pedal edema.  Absent: calf tenderness


Back exam: Present: normal inspection


Neurological exam: Present: alert


Psychiatric exam: Present: normal affect, normal mood


Skin exam: Present: normal color





Course


                                   Vital Signs











  07/15/24 07/15/24 07/15/24





  15:26 15:32 15:41


 


Temperature  98.2 F 


 


Pulse Rate 67  


 


Respiratory 18  15





Rate   


 


Blood Pressure 148/53  


 


O2 Sat by Pulse 100  





Oximetry   














  07/15/24 07/15/24 07/15/24





  16:19 16:29 16:32


 


Temperature   


 


Pulse Rate 70 74 80


 


Respiratory   18





Rate   


 


Blood Pressure   155/89


 


O2 Sat by Pulse   97





Oximetry   














EKG Findings





- EKG Results:


EKG: interpreted by ERMD (Left axis.  PVC present.  Septal Q waves.  Artifact 

present.), sinus rhythm, normal ST/T





Medical Decision Making





- Medical Decision Making





Was pt. sent in by a medical professional or institution (, PA, NP, urgent 

care, hospital, or nursing home...) When possible be specific


@  -Patient was sent from urgent care


Did you speak to anyone other than the patient for history (EMS, parent, family,

police, friend...)? What history was obtained from this source 


@  -No


Did you review nursing and triage notes (agree or disagree)?  Why? 


@  -I reviewed and agree with nursing and triage notes


Were old charts reviewed (outside hosp., previous admission, EMS record, old 

EKG, old radiological studies, urgent care reports/EKG's, nursing home records)?

Report findings 


@  -No old charts were reviewed


Differential Diagnosis (chest pain, altered mental status, abdominal pain women,

abdominal pain men, vaginal bleeding, weakness, fever, dyspnea, syncope, heada

donte, dizziness, GI bleed, back pain, seizure, CVA, palpatations, mental health, 

musculoskeletal)? 


@  -Differential Dyspnea:


Coronary syndrome, arrhythmia, tamponade, asthma, COPD, pulmonary embolism, 

pneumonia, pneumothorax, pulmonary effusion, anaphylaxis, diabetic ketoacidosis,

flailed chest, pulmonary contusion, diaphragmatic rupture, anemia, 

neuromuscular, this is not meant to be an all-inclusive list. 





EKG interpreted by me (3pts min.).


@  -As above


X-rays interpreted by me (1pt min.).


@  -Chest x-ray shows no acute process


CT interpreted by me (1pt min.).


@  -None done


U/S interpreted by me (1pt. min.).


@  -None done


What testing was considered but not performed or refused? (CT, X-rays, U/S, 

labs)? Why?


@  -None


What meds were considered but not given or refused? Why?


@  -None


Did you discuss the management of the patient with other professionals 

(professionals i.e. , PA, NP, lab, RT, psych nurse, , , 

teacher, , )? Give summary


@  -Case was discussed with Dr. Thomas, will admit covering Dr. Valle, who 

admits for Dr. Ashraf


Was smoking cessation discussed for >3mins.?


@  -No


Was critical care preformed (if so, how long)?


@  -No


Were there social determinants of health that impacted care today? How? 

(Homelessness, low income, unemployed, alcoholism, drug addiction, 

transportation, low edu. Level, literacy, decrease access to med. care, residential, 

rehab)?


@  -No


Was there de-escalation of care discussed even if they declined (Discuss DNR or 

withdrawal of care, Hospice)? DNR status


@  -No


What co-morbidities impacted this encounter? (DM, HTN, Smoking, COPD, CAD, 

Cancer, CVA, ARF, Chemo, Hep., AIDS, mental health diagnosis, sleep apnea, 

morbid obesity)?


@  -None


Was patient admitted / discharged? Hospital course, mention meds given and 

route, prescriptions, significant lab abnormalities, going to OR and other 

pertinent info.


@  -Patient presents with dyspnea, exertional dyspnea and edema.  Clinical 

concern for CHF.  BNP elevated.  Patient will be admitted with cardiac consult. 

Admission orders written.


Undiagnosed new problem with uncertain prognosis?


@  -No


Drug Therapy requiring intensive monitoring for toxicity (Heparin, Nitro, 

Insulin, Cardizem)?


@  -No


Were any procedures done?


@  -No


Diagnosis/symptom?


@  -CHF


Acute, or Chronic, or Acute on Chronic?


@  -Acute


Uncomplicated (without systemic symptoms) or Complicated (systemic symptoms)?


@  -Default


Side effects of treatment?


@  -No


Exacerbation, Progression, or Severe Exacerbation?


@  -No


Poses a threat to life or bodily function? How? (Chest pain, USA, MI, pneumonia,

PE, COPD, DKA, ARF, appy, cholecystitis, CVA, Diverticulitis, Homicidal, 

Suicidal, threat to staff... and all critical care pts)


@  -Risk to cardiac function








- Lab Data


Result diagrams: 


                                 07/15/24 15:46





                                 07/15/24 15:46


                                   Lab Results











  07/15/24 07/15/24 07/15/24 Range/Units





  15:46 15:46 15:46 


 


WBC  5.3    (3.8-10.6)  k/uL


 


RBC  3.02 L    (3.80-5.40)  m/uL


 


Hgb  9.9 L    (11.4-16.0)  gm/dL


 


Hct  31.3 L    (34.0-46.0)  %


 


MCV  103.5 H    (80.0-100.0)  fL


 


MCH  32.6    (25.0-35.0)  pg


 


MCHC  31.5    (31.0-37.0)  g/dL


 


RDW  13.7    (11.5-15.5)  %


 


Plt Count  200    (150-450)  k/uL


 


MPV  7.9    


 


Neutrophils %  65    %


 


Lymphocytes %  24    %


 


Monocytes %  6    %


 


Eosinophils %  2    %


 


Basophils %  0    %


 


Neutrophils #  3.5    (1.3-7.7)  k/uL


 


Lymphocytes #  1.3    (1.0-4.8)  k/uL


 


Monocytes #  0.3    (0-1.0)  k/uL


 


Eosinophils #  0.1    (0-0.7)  k/uL


 


Basophils #  0.0    (0-0.2)  k/uL


 


Macrocytosis  Slight    


 


PT   10.1   (10.0-12.5)  sec


 


INR   0.9   (<1.2)  


 


APTT   22.9   (22.0-30.0)  sec


 


Sodium    138  (137-145)  mmol/L


 


Potassium    3.3 L  (3.5-5.1)  mmol/L


 


Chloride    101  ()  mmol/L


 


Carbon Dioxide    31 H  (22-30)  mmol/L


 


Anion Gap    6  mmol/L


 


BUN    24 H  (7-17)  mg/dL


 


Creatinine    3.05 H  (0.52-1.04)  mg/dL


 


Est GFR (CKD-EPI)AfAm    16  (>60 ml/min/1.73 sqM)  


 


Est GFR (CKD-EPI)NonAf    14  (>60 ml/min/1.73 sqM)  


 


Glucose    103 H  (74-99)  mg/dL


 


Plasma Lactic Acid Tomy     (0.7-2.0)  mmol/L


 


Calcium    8.5  (8.4-10.2)  mg/dL


 


Magnesium    1.9  (1.6-2.3)  mg/dL


 


Total Bilirubin    0.7  (0.2-1.3)  mg/dL


 


AST    36  (14-36)  U/L


 


ALT    20  (4-34)  U/L


 


Alkaline Phosphatase    287 H  ()  U/L


 


Troponin I     (0.000-0.034)  ng/mL


 


NT-Pro-B Natriuret Pep    45380  pg/mL


 


Total Protein    6.3  (6.3-8.2)  g/dL


 


Albumin    3.5  (3.5-5.0)  g/dL














  07/15/24 07/15/24 Range/Units





  15:46 15:46 


 


WBC    (3.8-10.6)  k/uL


 


RBC    (3.80-5.40)  m/uL


 


Hgb    (11.4-16.0)  gm/dL


 


Hct    (34.0-46.0)  %


 


MCV    (80.0-100.0)  fL


 


MCH    (25.0-35.0)  pg


 


MCHC    (31.0-37.0)  g/dL


 


RDW    (11.5-15.5)  %


 


Plt Count    (150-450)  k/uL


 


MPV    


 


Neutrophils %    %


 


Lymphocytes %    %


 


Monocytes %    %


 


Eosinophils %    %


 


Basophils %    %


 


Neutrophils #    (1.3-7.7)  k/uL


 


Lymphocytes #    (1.0-4.8)  k/uL


 


Monocytes #    (0-1.0)  k/uL


 


Eosinophils #    (0-0.7)  k/uL


 


Basophils #    (0-0.2)  k/uL


 


Macrocytosis    


 


PT    (10.0-12.5)  sec


 


INR    (<1.2)  


 


APTT    (22.0-30.0)  sec


 


Sodium    (137-145)  mmol/L


 


Potassium    (3.5-5.1)  mmol/L


 


Chloride    ()  mmol/L


 


Carbon Dioxide    (22-30)  mmol/L


 


Anion Gap    mmol/L


 


BUN    (7-17)  mg/dL


 


Creatinine    (0.52-1.04)  mg/dL


 


Est GFR (CKD-EPI)AfAm    (>60 ml/min/1.73 sqM)  


 


Est GFR (CKD-EPI)NonAf    (>60 ml/min/1.73 sqM)  


 


Glucose    (74-99)  mg/dL


 


Plasma Lactic Acid Tomy  1.1   (0.7-2.0)  mmol/L


 


Calcium    (8.4-10.2)  mg/dL


 


Magnesium    (1.6-2.3)  mg/dL


 


Total Bilirubin    (0.2-1.3)  mg/dL


 


AST    (14-36)  U/L


 


ALT    (4-34)  U/L


 


Alkaline Phosphatase    ()  U/L


 


Troponin I   <0.012  (0.000-0.034)  ng/mL


 


NT-Pro-B Natriuret Pep    pg/mL


 


Total Protein    (6.3-8.2)  g/dL


 


Albumin    (3.5-5.0)  g/dL














Disposition


Clinical Impression: 


 Congestive heart failure





Disposition: ADMITTED AS IP TO THIS HOSP


Is patient prescribed a controlled substance at d/c from ED?: No


Referrals: 


Esau Ashraf DO [Primary Care Provider] - 1-2 days


Time of Disposition: 18:40

## 2024-07-15 NOTE — XR
EXAMINATION TYPE: XR chest 2V

 

DATE OF EXAM: 7/15/2024

 

COMPARISON: 12/6/2023

 

HISTORY: Shortness of breath

 

TECHNIQUE:  Frontal and lateral views of the chest are obtained.

 

FINDINGS:

 

Scattered senescent parenchymal changes noted. 

 

No evidence for infiltrate. No evidence for atelectasis.

 

Heart size is stable.

 

Mediastinal structures are stable and grossly unremarkable.

 

No evidence for hilar prominence.

 

Degenerative changes dorsal spine. 

 

IMPRESSION:

1. No evidence for acute pulmonary disease.

## 2024-07-16 LAB
FERRITIN SERPL-MCNC: 1731 NG/ML (ref 10–291)
IRON SERPL-MCNC: 80 UG/DL (ref 50–170)
TIBC SERPL-MCNC: 216 UG/DL (ref 228–460)

## 2024-07-16 PROCEDURE — 5A1D70Z PERFORMANCE OF URINARY FILTRATION, INTERMITTENT, LESS THAN 6 HOURS PER DAY: ICD-10-PCS

## 2024-07-16 RX ADMIN — ASPIRIN 325 MG ORAL TABLET SCH MG: 325 PILL ORAL at 09:17

## 2024-07-16 RX ADMIN — PANTOPRAZOLE SODIUM SCH MG: 40 TABLET, DELAYED RELEASE ORAL at 06:26

## 2024-07-16 RX ADMIN — LEVOTHYROXINE SODIUM SCH MCG: 0.11 TABLET ORAL at 06:26

## 2024-07-16 RX ADMIN — BUDESONIDE SCH: 0.5 INHALANT ORAL at 20:32

## 2024-07-16 RX ADMIN — ISODIUM CHLORIDE PRN MG: 0.03 SOLUTION RESPIRATORY (INHALATION) at 08:12

## 2024-07-16 RX ADMIN — IPRATROPIUM BROMIDE AND ALBUTEROL SULFATE SCH ML: .5; 3 SOLUTION RESPIRATORY (INHALATION) at 15:20

## 2024-07-16 RX ADMIN — CYANOCOBALAMIN TAB 500 MCG SCH MCG: 500 TAB at 09:17

## 2024-07-16 RX ADMIN — OXYCODONE HYDROCHLORIDE AND ACETAMINOPHEN SCH MG: 500 TABLET ORAL at 09:17

## 2024-07-16 RX ADMIN — Medication SCH MG: at 06:26

## 2024-07-16 NOTE — P.CRDCN
History of Present Illness


History of present illness: 





HISTORY OF PRESENT ILLNESS:  





This is a 78-year-old female with a past medical history significant for end-

stage renal disease on hemodialysis, hypertension, hyperlipidemia, diabetes, 

asthma, former nicotine dependence, and morbid obesity. Patient follows in the 

office with Dr. Vitale. We have been asked to see the patient in consultation for

congestive heart failure. 





patient states she was at dialysis yesterday when she had acute onset of 

shortness breath and not been able to catch her breath.  She denies any chest 

pain or pressure.  Denies any recent fevers, chills, nausea.  She denies missing

any dialysis treatments.  Chest x-ray performed which shows no acute 

cardiopulmonary processes.  She does have some orthopnea and feels worse lying 

down.  She states she has not had a stress test recently and cannot tolerate 

stress test.  she does still make some urine and was placed on IV Lasix 40 mg 

daily 8 hours.  EKG shows sinus rhythm with PACs and occasional PVCs, left axis 

deviation, nonspecific ST depressions.











REVIEW OF SYSTEMS: 


At the time of my exam:


CONSTITUTIONAL: Denies fever or chills.


HEENT: Denies blurred vision, vision changes, or eye pain. Denies hemoptysis 


CARDIOVASCULAR: Denies chest pain. +orthopnea. Denies PND. Denies palpitations


RESPIRATORY: +shortness of breath. 


GASTROINTESTINAL: Denies abdominal pain. Denies nausea or vomiting. 


HEMATOLOGIC: Denies bleeding disorders.


GENITOURINARY:  Denies any blood in urine.


SKIN: Denies pruitis. Denies rash.





PHYSICAL EXAM: 


VITAL SIGNS: Reviewed.


GENERAL: Well-developed in no acute distress. 


HEENT: Head is normocephalic. Pupils are equal, round. Sclerae anicteric. Mucous

membranes of the mouth are moist. Neck supple. No JVD or thyromegaly


LUNGS: Respirations even and unlabored. Lungs essentially clear to auscultation 

bilaterally but diminished.


HEART: Regular rate and rhythm.  S1 and S2 heard.  Systolic murmur noted.


ABDOMEN: Soft. Nondistended. Nontender.


EXTREMITIES: Normal range of motion.  No clubbing or cyanosis.  Peripheral 

pulses intact.  2-3+ bilateral lower extremity edema with evidence of cellulitis

of the right lower extremity


NEUROLOGIC: Awake and alert. Oriented x 3. 





ASSESSMENT: 


Shortness of breath


Acute on chronic heart failure with preserved ejection fraction, 55-60%


End-stage renal disease on hemodialysis


Hypertension


Hyperlipidemia


Diabetes


Asthma


Former nicotine dependence


Morbid obesity





PLAN: 


most of symptoms appear related to heart failure and fluid overload.  She is 

orthopneic and monitor response of diuresis and hemodialysis.  If still having 

significant symptoms may consider ischemic workup however has not been able to 

tolerate stress test in the past and if still having symptoms may consider heart

catheterization.  Trial of Nitro-Dur to see if this helps with any angina as 

well as heart failure.  Further recommendations to follow.





Past Medical History


Past Medical History: Asthma, Cancer, COPD, Dialysis, Hyperlipidemia, 

Hypertension, Myocardial Infarction (MI), Osteoarthritis (OA), Pneumonia, Renal 

Disease, Thyroid Disorder


Additional Past Medical History / Comment(s): Hx Uterine cancer 25 yrs ago, 

hemodialysis Mon, Wed and Fri, varicose veins., "mild heart attack"" "brain bl

eed twice" chronic diarrhea, hx "18 polyps", "chronic back pain", anemia, 

current edema rufina feet, diet control diabetic


Last Myocardial Infarction Date:: unknown


History of Any Multi-Drug Resistant Organisms: None Reported


Past Surgical History: Appendectomy, Bariatric Surgery, Cholecystectomy, 

Hysterectomy, Joint Replacement, Tonsillectomy


Additional Past Surgical History / Comment(s): gastric bypass, surgery after 

fall -fx rt leg and foot, rufina knee replacement, rufina cataracts, surgery x 2 or "b

rain bleed", fistula for dialysis, COLONOSCOPY,


Past Anesthesia/Blood Transfusion Reactions: Family History of Problems w/ 

Anesthesia


Additional Past Anesthesia/Blood Transfusion Reaction / Comment(s): MOTHER PONV


Past Psychological History: No Psychological Hx Reported


Smoking Status: Former smoker


Past Alcohol Use History: None Reported


Additional Past Alcohol Use History / Comment(s): Quit smoking 30+ yrs ago.


Past Drug Use History: None Reported





- Past Family History


  ** Mother


Family Medical History: Cancer





  ** Father


Family Medical History: Diabetes Mellitus, Hypertension





  ** Sister(s)


Family Medical History: Cancer





Medications and Allergies


                                Home Medications











 Medication  Instructions  Recorded  Confirmed  Type


 


Levothyroxine Sodium [Synthroid] 112 mcg PO AC-BRKFST 02/04/20 07/15/24 History


 


Rosuvastatin Calcium [Crestor] 10 mg PO HS 02/04/20 07/15/24 History


 


Calcium Acetate 1,334 mg PO TID-W/MEALS 11/09/20 07/15/24 History


 


Metoprolol Tartrate [Lopressor] 50 mg PO BID 11/09/20 07/15/24 History


 


oxyCODONE-APAP 10-325MG [Percocet 1 tab PO TID PRN 01/07/23 07/15/24 History





 mg]    


 


Albuterol Inhaler [Ventolin Hfa 2 puff INHALATION RT-QID PRN 05/04/23 07/15/24 

History





Inhaler]    


 


Albuterol Nebulized [Ventolin 2.5 mg INHALATION RT-QID PRN 05/04/23 07/15/24 

History





Nebulized]    


 


lisinopriL 40 mg PO DAILY 12/06/23 07/15/24 History


 


Acetaminophen Tab [Tylenol] 650 mg PO Q4HR PRN  tab 12/11/23 07/15/24 Rx


 


Ascorbic Acid [Vitamin C] 500 mg PO DAILY #30 tab 12/11/23 07/15/24 Rx


 


Cyanocobalamin (Vitamin B-12) 1,000 mcg PO DAILY 07/15/24 07/15/24 History





[Vitamin B-12]    


 


Folic Acid/Vit B Complex and C 0.8 mg PO HS 07/15/24 07/15/24 History





[Nephro-Hyacinth Tablet]    


 


Furosemide [Lasix] 80 mg PO DAILY 07/15/24 07/15/24 History


 


Lidocaine-Prilocaine Cream [Emla 1 applic TOPICAL AS DIRECTED PRN 07/15/24 

07/15/24 History





Cream 2.5%/2.5%]    


 


Omeprazole [PriLOSEC] 20 mg PO BID 07/15/24 07/15/24 History


 


amLODIPine [Norvasc] 10 mg PO DAILY 07/15/24 07/15/24 History


 


hydrALAZINE HCL [Apresoline] 100 mg PO BID 07/15/24 07/15/24 History








                                    Allergies











Allergy/AdvReac Type Severity Reaction Status Date / Time


 


cefuroxime Allergy  Rash/Hives Verified 07/15/24 17:44


 


naproxen [From Naprosyn] Allergy  Dyspnea Verified 07/15/24 17:44


 


blood thinner Allergy  "brain Uncoded 07/15/24 17:44





   bleed"  














Physical Exam


Vitals: 


                                   Vital Signs











  Temp Pulse Pulse Resp BP BP Pulse Ox


 


 07/16/24 11:37   80     


 


 07/16/24 11:28   76     


 


 07/16/24 08:23   74     


 


 07/16/24 08:15   70      94 L


 


 07/16/24 07:00  98.5 F   83  19   172/67  93 L


 


 07/16/24 03:12   66   18    92 L


 


 07/16/24 02:00  97.9 F   69  18   171/77  98


 


 07/16/24 01:37  98.1 F  66   16  152/73   96


 


 07/15/24 23:18   68   18  157/69   93 L


 


 07/15/24 19:24   81   19  127/49   96


 


 07/15/24 19:02   81   16  136/52   97


 


 07/15/24 16:32   80   18  155/89   97


 


 07/15/24 16:29   74     


 


 07/15/24 16:19   70     


 


 07/15/24 15:41     15   


 


 07/15/24 15:32  98.2 F      


 


 07/15/24 15:26   67   18  148/53   100








                                Intake and Output











 07/15/24 07/16/24 07/16/24





 22:59 06:59 14:59


 


Intake Total   0


 


Balance   0


 


Intake:   


 


  Oral   0


 


Other:   


 


  Weight 102.058 kg 102.058 kg 102.058 kg














Results





                                 07/15/24 15:46





                                 07/15/24 15:46


                                 Cardiac Enzymes











  07/15/24 07/15/24 07/15/24 Range/Units





  15:46 15:46 19:54 


 


AST  36    (14-36)  U/L


 


Troponin I   <0.012  <0.012  (0.000-0.034)  ng/mL














  07/15/24 Range/Units





  22:34 


 


AST   (14-36)  U/L


 


Troponin I  <0.012  (0.000-0.034)  ng/mL








                                   Coagulation











  07/15/24 Range/Units





  15:46 


 


PT  10.1  (10.0-12.5)  sec


 


APTT  22.9  (22.0-30.0)  sec








                                       CBC











  07/15/24 Range/Units





  15:46 


 


WBC  5.3  (3.8-10.6)  k/uL


 


RBC  3.02 L  (3.80-5.40)  m/uL


 


Hgb  9.9 L  (11.4-16.0)  gm/dL


 


Hct  31.3 L  (34.0-46.0)  %


 


Plt Count  200  (150-450)  k/uL








                          Comprehensive Metabolic Panel











  07/15/24 Range/Units





  15:46 


 


Sodium  138  (137-145)  mmol/L


 


Potassium  3.3 L  (3.5-5.1)  mmol/L


 


Chloride  101  ()  mmol/L


 


Carbon Dioxide  31 H  (22-30)  mmol/L


 


BUN  24 H  (7-17)  mg/dL


 


Creatinine  3.05 H  (0.52-1.04)  mg/dL


 


Glucose  103 H  (74-99)  mg/dL


 


Calcium  8.5  (8.4-10.2)  mg/dL


 


AST  36  (14-36)  U/L


 


ALT  20  (4-34)  U/L


 


Alkaline Phosphatase  287 H  ()  U/L


 


Total Protein  6.3  (6.3-8.2)  g/dL


 


Albumin  3.5  (3.5-5.0)  g/dL








                               Current Medications











Generic Name Dose Route Start Last Admin





  Trade Name Freq  PRN Reason Stop Dose Admin


 


Acetaminophen  650 mg  07/15/24 18:35 





  Acetaminophen Tab 325 Mg Tab  PO  





  Q4HR PRN  





  Fever>101  


 


Albuterol Sulfate  2.5 mg  07/15/24 18:35  07/16/24 11:27





  Albuterol Nebulized 2.5 Mg/3 Ml  INHALATION   2.5 mg





  RT-QID PRN   Administration





  Shortness Of Breath  


 


Amlodipine Besylate  10 mg  07/16/24 09:00  07/16/24 09:16





  Amlodipine 10 Mg Tab  PO   10 mg





  DAILY LUCA   Administration


 


Ascorbic Acid  500 mg  07/16/24 09:00  07/16/24 09:17





  Ascorbic Acid 500 Mg Tab  PO   500 mg





  DAILY LUCA   Administration


 


Aspirin  325 mg  07/16/24 09:00  07/16/24 09:17





  Aspirin 325 Mg Tab  PO   325 mg





  DAILY LUCA   Administration


 


Atorvastatin Calcium  20 mg  07/15/24 21:00  07/15/24 20:16





  Atorvastatin 20 Mg Tab  PO   20 mg





  HS LUCA   Administration


 


Calcium Acetate  1,334 mg  07/16/24 07:30  07/16/24 12:11





  Calcium Acetate 667 Mg Tab  PO   1,334 mg





  TID-W/MEALS LUCA   Administration


 


Cyanocobalamin  1,000 mcg  07/16/24 09:00  07/16/24 09:17





  Cyanocobalamin 500 Mcg Tab  PO   1,000 mcg





  DAILY LUCA   Administration


 


Furosemide  40 mg  07/15/24 18:45  07/16/24 09:16





  Furosemide 10 Mg/Ml 4 Ml Vial  IV   40 mg





  Q8HR LUCA   Administration


 


Hydralazine HCl  100 mg  07/15/24 21:00  07/16/24 11:08





  Hydralazine Hcl 50 Mg Tab  PO   100 mg





  BID LUCA   Administration


 


Levothyroxine Sodium  112 mcg  07/16/24 07:30  07/16/24 06:26





  Levothyroxine 112 Mcg Tab  PO   112 mcg





  -BRKFST LUCA   Administration


 


Lisinopril  40 mg  07/16/24 09:00  07/16/24 09:17





  Lisinopril 20 Mg Tab  PO   40 mg





  DAILY LUCA   Administration


 


Metoprolol Tartrate  50 mg  07/15/24 21:00  07/16/24 09:17





  Metoprolol Tartrate 50 Mg Tab  PO   50 mg





  BID LUCA   Administration


 


Multivit/Ca Carb/B Cmplx/FA/Prenat  1 each  07/15/24 21:00  07/15/24 20:16





  Folic Acid-Vit B Complex-Vit C 1 Cap  PO   1 each





  HS LUCA   Administration


 


Nitroglycerin  1 inch  07/15/24 19:00  07/16/24 09:18





  Nitroglycerin Oint 1 Inch/Gm Packet  TOPICAL   Not Given





  QID LUCA  


 


Oxycodone/Acetaminophen  1 each  07/15/24 18:35  07/16/24 12:11





  Oxycodone-Apap 10-325mg 1 Each Tab  PO   1 each





  TID PRN   Administration





  Pain  


 


Pantoprazole Sodium  40 mg  07/16/24 07:30  07/16/24 06:26





  Pantoprazole 40 Mg Tablet  PO   40 mg





  -BRKFST LUCA   Administration


 


Sodium Chloride  10 ml  07/15/24 21:00  07/16/24 09:18





  Sodium Chloride 0.9% Flush 10 Ml Syringe  IV   10 ml





  BID LUCA   Administration








                                Intake and Output











 07/15/24 07/16/24 07/16/24





 22:59 06:59 14:59


 


Intake Total   0


 


Balance   0


 


Intake:   


 


  Oral   0


 


Other:   


 


  Weight 102.058 kg 102.058 kg 102.058 kg








                                 Patient Weight











 07/17/24





 06:59


 


Weight 102.058 kg








                                        





                                 07/15/24 15:46 





                                 07/15/24 15:46

## 2024-07-16 NOTE — P.NPCON
History of Present Illness





- Reason for Consult


end stage renal disease





- History of Present Illness





Reason for consultation: End-stage renal disease





History of present illness:


Patient is a 78-year-old female seen in renal consultation for end-stage renal 

disease.  She is maintained on hemodialysis on Monday Wednesday Friday schedule 

via right upper extremity AV fistula.  Patient went to hemodialysis yesterday 

but the treatment was cut short due to shortness of breath.  Patient states 

shortness of breath is not going on for couple of weeks now.  She denies any 

fever or chills.  No cough.  No vomiting or diarrhea.  She came to the hospital 

for further evaluation.  Chest x-ray showed no acute cardiopulmonary disease.  

Oral intake has been fair.  Patient has history of CVA.  Denies chest pain.  Adonay dobbins does make urine.  She is currently on IV Lasix.  Denies history of 

coronary disease.  No history of diabetes.





Vital signs are stable.


General: No acute distress.


HEENT: Head exam is unremarkable. 


LUNGS: No audible rhonchi or wheezes.


HEART: Rate and Rhythm are regular. 


ABDOMEN: Nontender.


EXTREMITITES: No edema.





Past Medical History


Past Medical History: Asthma, Cancer, COPD, Dialysis, Hyperlipidemia, 

Hypertension, Myocardial Infarction (MI), Osteoarthritis (OA), Pneumonia, Renal 

Disease, Thyroid Disorder


Additional Past Medical History / Comment(s): Hx Uterine cancer 25 yrs ago, 

hemodialysis Mon, Wed and Fri, varicose veins., "mild heart attack"" "brain 

bleed twice" chronic diarrhea, hx "18 polyps", "chronic back pain", anemia, 

current edema rufina feet, diet control diabetic


Last Myocardial Infarction Date:: unknown


History of Any Multi-Drug Resistant Organisms: None Reported


Past Surgical History: Appendectomy, Bariatric Surgery, Cholecystectomy, 

Hysterectomy, Joint Replacement, Tonsillectomy


Additional Past Surgical History / Comment(s): gastric bypass, surgery after 

fall -fx rt leg and foot, rufina knee replacement, rufina cataracts, surgery x 2 or 

"brain bleed", fistula for dialysis, COLONOSCOPY,


Past Anesthesia/Blood Transfusion Reactions: Family History of Problems w/ 

Anesthesia


Additional Past Anesthesia/Blood Transfusion Reaction / Comment(s): MOTHER PONV


Past Psychological History: No Psychological Hx Reported


Smoking Status: Former smoker


Past Alcohol Use History: None Reported


Additional Past Alcohol Use History / Comment(s): Quit smoking 30+ yrs ago.


Past Drug Use History: None Reported





- Past Family History


  ** Mother


Family Medical History: Cancer





  ** Father


Family Medical History: Diabetes Mellitus, Hypertension





  ** Sister(s)


Family Medical History: Cancer





Medications and Allergies


                                Home Medications











 Medication  Instructions  Recorded  Confirmed  Type


 


Levothyroxine Sodium [Synthroid] 112 mcg PO AC-BRKFST 02/04/20 07/15/24 History


 


Rosuvastatin Calcium [Crestor] 10 mg PO HS 02/04/20 07/15/24 History


 


Calcium Acetate 1,334 mg PO TID-W/MEALS 11/09/20 07/15/24 History


 


Metoprolol Tartrate [Lopressor] 50 mg PO BID 11/09/20 07/15/24 History


 


oxyCODONE-APAP 10-325MG [Percocet 1 tab PO TID PRN 01/07/23 07/15/24 History





 mg]    


 


Albuterol Inhaler [Ventolin Hfa 2 puff INHALATION RT-QID PRN 05/04/23 07/15/24 

History





Inhaler]    


 


Albuterol Nebulized [Ventolin 2.5 mg INHALATION RT-QID PRN 05/04/23 07/15/24 

History





Nebulized]    


 


lisinopriL 40 mg PO DAILY 12/06/23 07/15/24 History


 


Acetaminophen Tab [Tylenol] 650 mg PO Q4HR PRN  tab 12/11/23 07/15/24 Rx


 


Ascorbic Acid [Vitamin C] 500 mg PO DAILY #30 tab 12/11/23 07/15/24 Rx


 


Cyanocobalamin (Vitamin B-12) 1,000 mcg PO DAILY 07/15/24 07/15/24 History





[Vitamin B-12]    


 


Folic Acid/Vit B Complex and C 0.8 mg PO HS 07/15/24 07/15/24 History





[Nephro-Hyacinth Tablet]    


 


Furosemide [Lasix] 80 mg PO DAILY 07/15/24 07/15/24 History


 


Lidocaine-Prilocaine Cream [Emla 1 applic TOPICAL AS DIRECTED PRN 07/15/24 

07/15/24 History





Cream 2.5%/2.5%]    


 


Omeprazole [PriLOSEC] 20 mg PO BID 07/15/24 07/15/24 History


 


amLODIPine [Norvasc] 10 mg PO DAILY 07/15/24 07/15/24 History


 


hydrALAZINE HCL [Apresoline] 100 mg PO BID 07/15/24 07/15/24 History








                                    Allergies











Allergy/AdvReac Type Severity Reaction Status Date / Time


 


cefuroxime Allergy  Rash/Hives Verified 07/15/24 17:44


 


naproxen [From Naprosyn] Allergy  Dyspnea Verified 07/15/24 17:44


 


blood thinner Allergy  "brain Uncoded 07/15/24 17:44





   bleed"  














Physical Exam


Vitals: 


                                   Vital Signs











  Temp Pulse Pulse Resp BP BP Pulse Ox


 


 07/16/24 11:37   80     


 


 07/16/24 11:28   76     


 


 07/16/24 08:23   74     


 


 07/16/24 08:15   70      94 L


 


 07/16/24 07:00  98.5 F   83  19   172/67  93 L


 


 07/16/24 03:12   66   18    92 L


 


 07/16/24 02:00  97.9 F   69  18   171/77  98


 


 07/16/24 01:37  98.1 F  66   16  152/73   96


 


 07/15/24 23:18   68   18  157/69   93 L


 


 07/15/24 19:24   81   19  127/49   96


 


 07/15/24 19:02   81   16  136/52   97


 


 07/15/24 16:32   80   18  155/89   97


 


 07/15/24 16:29   74     


 


 07/15/24 16:19   70     


 


 07/15/24 15:41     15   


 


 07/15/24 15:32  98.2 F      


 


 07/15/24 15:26   67   18  148/53   100








                                Intake and Output











 07/15/24 07/16/24 07/16/24





 22:59 06:59 14:59


 


Intake Total   0


 


Balance   0


 


Intake:   


 


  Oral   0


 


Other:   


 


  Weight 102.058 kg 102.058 kg 102.058 kg














Results





- Lab Results


                             Most recent lab results











Calcium  8.5 mg/dL (8.4-10.2)   07/15/24  15:46    


 


Magnesium  1.9 mg/dL (1.6-2.3)   07/15/24  15:46    














                                 07/15/24 15:46





                                 07/15/24 15:46





Assessment and Plan


Plan: 





Assessment:


1.  End-stage renal disease maintained on hemodialysis on Monday Wednesday Friday schedule.


2.  Volume overload.


3.  Hypertension with chronic kidney disease.


4.  Hypokalemia.  This was checked shortly after dialysis and will be repeated.


5.  Anemia of chronic kidney disease.


6.  Chronic kidney disease mineral bone disease.


7.  History of hemorrhagic CVA.





Plan:


Hemodialysis today for 2 hours.  Plan for another treatment tomorrow.  Challenge

[facility]F.


Check iron studies.


Follow-up echocardiogram.


Maintain IV Lasix for now.





Thank you for the consultation.  I will continue to follow the patient with you 

during her hospital stay.

## 2024-07-16 NOTE — P.HPIM
History of Present Illness


H&P Date: 07/16/24


History of present illness; 78-year-old female presented to the emergency 

department with difficulty breathing.  Patient states that the ED symptoms began

1 week ago, accompanied by some leg swelling.  Symptoms have progressed since 

that time, patient had gone to her primary care doctor's office in which a chest

x-ray was completed.  She states that chest x-ray showed "fluid in her lungs".  

Her past medical history is significant for asthma, COPD, MI and CKD.  Patient 

states that she has a Ventolin inhaler at home, for which she has been out of 

medication for a little while now however she has not needed a breathing 

treatment.  States that to her memory the last time she needed a breathing tr

eatment was approximately 1 year ago.  For her CKD she receives dialysis on 

Mondays, Wednesday and Friday.  She presented to the emergency department from 

her dialysis on Monday because her oxygen saturations were at 90% on room air.


 


Initial lab work done in the ER showed Hgb of 9.9, Hct of 31.3, potassium of 

3.3, BUN of 24, creatinine of 3.05 and alkaline phosphatase of 287.


 


EKG done in the ER showed sinus rhythm with occasional ventricular premature 

complexes with occasional supraventricular premature complexes and possible 

anteroseptal myocardial infarction of indeterminate age.





Chest x-ray done in the ER showed no evidence of acute pulmonary disease.


 


Patient admitted to internal medicine service


 


REVIEW OF SYSTEMS: 


CONSTITUTIONAL: No fever, no malaise, no fatigue. 


HEENT: No recent visual problems or hearing problems. Denied any sore throat. 


CARDIOVASCULAR: No chest pain, orthopnea, PND, no palpitations, no syncope. 


PULMONARY: Shortness of breath. 


GASTROINTESTINAL: No diarrhea, no nausea, no vomiting, no abdominal pain. 


NEUROLOGICAL: No headaches, no weakness, no numbness. 


HEMATOLOGICAL: Denies any bleeding or petechiae. 


GENITOURINARY: Denies any burning micturition, frequency, or urgency. 


MUSCULOSKELETAL/RHEUMATOLOGICAL: Denies any joint pain, swelling, or any muscle 

pain. 


ENDOCRINE: Denies any polyuria or polydipsia. 


 


The rest of the 14-point review of systems is negative.


 


 


 


PHYSICAL EXAMINATION: 


 


GENERAL: The patient is alert and oriented x3, not in any acute distress. Well 

developed, well nourished. 


HEENT: Pupils are round and equally reacting to light. EOMI. No scleral icterus.

No conjunctival pallor. Normocephalic, atraumatic. No pharyngeal erythema. No 

thyromegaly. 


CARDIOVASCULAR: S1 and S2 present. No murmurs, rubs, or gallops. 


PULMONARY: Chest is clear to auscultation, no wheezing or crackles. 


ABDOMEN: Soft, nontender, nondistended, normoactive bowel sounds. No palpable 

organomegaly. 


MUSCULOSKELETAL: No joint swelling or deformity.


EXTREMITIES: No cyanosis, clubbing, or pedal edema. 


NEUROLOGICAL: Gross neurological examination did not reveal any focal deficits. 


SKIN: No rashes. 


 


Assessment and plan


1.  Chronic kidney disease, stage V (end-stage chronic kidney disease)


 Creatinine 3.05, her baseline has fluctuated between 3 and 5.5 for the last 

year or so


 BUN is 24, baseline has fluctuated between 18 and 47.4 over the last year or 

so


 GFR less than 15 since May 2023, except for one measure in which her GFR was 

17 on 5/6/2023


 Patient receives dialysis 3 times weekly; Mondays, Wednesday, Friday


 Ordered CBC and CMP


 Hemodialysis today


 Planning for another hemodialysis treatment tomorrow


 PhosLo given for bone mineral density





2.  Megaloblastic anemia


 Previously diagnosed vitamin B12 deficiency on 12/10/2023.


 For which she has been taking cyanocobalamin 1000 mcg PO daily


 Check iron studies





3.  Fluid overload likely as a result of end-stage renal disease vs. CHF


 Patient still been able to produce some urine, placed on IV Lasix 40 mg daily


 Shortness of breath was exacerbated in the middle of her dialysis session on 

7/15, was unable to complete the entirety of


 Chest x-ray done in the ER showed no evidence of acute cardiopulmonary disease


 Shortness of breath treated with Ventolin nebulized at the bedside and 

Pulmicort 0.5 mg


 D-dimer is 1.04, completed to assess finding the cause of shortness of breath 

if not cardiac or chronic kidney disease





Chronic medical conditions


Hypertension


 Controlled with Zestril, Norvasc, Lopressor, Apresoline


Hyperlipidemia


 Controlled with rosuvastatin at home, switch to Lipitor in the hospital


Hypothyroidism


 Patient treated with Synthroid





GI prophylaxis -Protonix





Monitor vital signs


Labs and medication were reviewed. Continue with symptomatic treatment.  Resume 

home medication.  Monitor labs and vitals.  DVT and GI prophylaxis.  Further 

recommendations as per clinical course of the patient


 


Dictation was produced using dragon dictation software. please excuse any 

grammatical, word or spelling errors.


I saw and evaluated evaluated the patient and was present for major part of 

physical exam.


Patient is admitted secondary to volume overload patient still urinates, patient

respiratory status improved with dialysis and IV fluids.  Patient will undergo 

hemodialysis daily.  Cardiology evaluated the patient and they are recommending 

stress test.  Patient may have some amount of diastolic dysfunction which may 

have contributed to her volume overload.








Past Medical History


Past Medical History: Asthma, Cancer, COPD, Dialysis, Hyperlipidemia, 

Hypertension, Myocardial Infarction (MI), Osteoarthritis (OA), Pneumonia, Renal 

Disease, Thyroid Disorder


Additional Past Medical History / Comment(s): Hx Uterine cancer 25 yrs ago, 

hemodialysis Mon, Wed and Fri, varicose veins., "mild heart attack"" "brain 

bleed twice" chronic diarrhea, hx "18 polyps", "chronic back pain", anemia, 

current edema rufina feet, diet control diabetic


Last Myocardial Infarction Date:: unknown


History of Any Multi-Drug Resistant Organisms: None Reported


Past Surgical History: Appendectomy, Bariatric Surgery, Cholecystectomy, 

Hysterectomy, Joint Replacement, Tonsillectomy


Additional Past Surgical History / Comment(s): gastric bypass, surgery after 

fall -fx rt leg and foot, rufina knee replacement, rufina cataracts, surgery x 2 or 

"brain bleed", fistula for dialysis, COLONOSCOPY,


Past Anesthesia/Blood Transfusion Reactions: Family History of Problems w/ 

Anesthesia


Additional Past Anesthesia/Blood Transfusion Reaction / Comment(s): MOTHER PONV


Past Psychological History: No Psychological Hx Reported


Smoking Status: Former smoker


Past Alcohol Use History: None Reported


Additional Past Alcohol Use History / Comment(s): Quit smoking 30+ yrs ago.


Past Drug Use History: None Reported





- Past Family History


  ** Mother


Family Medical History: Cancer





  ** Father


Family Medical History: Diabetes Mellitus, Hypertension





  ** Sister(s)


Family Medical History: Cancer





Medications and Allergies


                                Home Medications











 Medication  Instructions  Recorded  Confirmed  Type


 


Levothyroxine Sodium [Synthroid] 112 mcg PO AC-[id number] 02/04/20 07/15/24 History


 


Rosuvastatin Calcium [Crestor] 10 mg PO HS 02/04/20 07/15/24 History


 


Calcium Acetate 1,334 mg PO TID-W/MEALS 11/09/20 07/15/24 History


 


Metoprolol Tartrate [Lopressor] 50 mg PO BID 11/09/20 07/15/24 History


 


oxyCODONE-APAP 10-325MG [Percocet 1 tab PO TID PRN 01/07/23 07/15/24 History





 mg]    


 


Albuterol Inhaler [Ventolin Hfa 2 puff INHALATION RT-QID PRN 05/04/23 07/15/24 

History





Inhaler]    


 


Albuterol Nebulized [Ventolin 2.5 mg INHALATION RT-QID PRN 05/04/23 07/15/24 

History





Nebulized]    


 


lisinopriL 40 mg PO DAILY 12/06/23 07/15/24 History


 


Acetaminophen Tab [Tylenol] 650 mg PO Q4HR PRN  tab 12/11/23 07/15/24 Rx


 


Ascorbic Acid [Vitamin C] 500 mg PO DAILY #30 tab 12/11/23 07/15/24 Rx


 


Cyanocobalamin (Vitamin B-12) 1,000 mcg PO DAILY 07/15/24 07/15/24 History





[Vitamin B-12]    


 


Folic Acid/Vit B Complex and C 0.8 mg PO HS 07/15/24 07/15/24 History





[Nephro-Hyacinth Tablet]    


 


Furosemide [Lasix] 80 mg PO DAILY 07/15/24 07/15/24 History


 


Lidocaine-Prilocaine Cream [Emla 1 applic TOPICAL AS DIRECTED PRN 07/15/24 

07/15/24 History





Cream 2.5%/2.5%]    


 


Omeprazole [PriLOSEC] 20 mg PO BID 07/15/24 07/15/24 History


 


amLODIPine [Norvasc] 10 mg PO DAILY 07/15/24 07/15/24 History


 


hydrALAZINE HCL [Apresoline] 100 mg PO BID 07/15/24 07/15/24 History








                                    Allergies











Allergy/AdvReac Type Severity Reaction Status Date / Time


 


cefuroxime Allergy  Rash/Hives Verified 07/15/24 17:44


 


naproxen [From Naprosyn] Allergy  Dyspnea Verified 07/15/24 17:44


 


blood thinner Allergy  "brain Uncoded 07/15/24 17:44





   bleed"  














Physical Exam


Vitals: 


                                   Vital Signs











  Temp Pulse Pulse Resp BP BP Pulse Ox


 


 07/16/24 08:23   74     


 


 07/16/24 08:15   70      94 L


 


 07/16/24 03:12   66   18    92 L


 


 07/16/24 02:00  97.9 F   69  18   171/77  98


 


 07/16/24 01:37  98.1 F  66   16  152/73   96


 


 07/15/24 23:18   68   18  157/69   93 L


 


 07/15/24 19:24   81   19  127/49   96


 


 07/15/24 19:02   81   16  136/52   97


 


 07/15/24 16:32   80   18  155/89   97


 


 07/15/24 16:29   74     


 


 07/15/24 16:19   70     


 


 07/15/24 15:41     15   


 


 07/15/24 15:32  98.2 F      


 


 07/15/24 15:26   67   18  148/53   100








                                Intake and Output











 07/15/24 07/16/24 07/16/24





 22:59 06:59 14:59


 


Other:   


 


  Weight 102.058 kg 102.058 kg 














Results


CBC & Chem 7: 


                                 07/15/24 15:46





                                 07/15/24 15:46


Labs: 


                  Abnormal Lab Results - Last 24 Hours (Table)











  07/15/24 07/15/24 Range/Units





  15:46 15:46 


 


RBC  3.02 L   (3.80-5.40)  m/uL


 


Hgb  9.9 L   (11.4-16.0)  gm/dL


 


Hct  31.3 L   (34.0-46.0)  %


 


MCV  103.5 H   (80.0-100.0)  fL


 


Potassium   3.3 L  (3.5-5.1)  mmol/L


 


Carbon Dioxide   31 H  (22-30)  mmol/L


 


BUN   24 H  (7-17)  mg/dL


 


Creatinine   3.05 H  (0.52-1.04)  mg/dL


 


Glucose   103 H  (74-99)  mg/dL


 


Alkaline Phosphatase   287 H  ()  U/L

## 2024-07-17 RX ADMIN — DARBEPOETIN ALFA SCH MCG: 40 INJECTION, SOLUTION INTRAVENOUS; SUBCUTANEOUS at 14:18

## 2024-07-17 RX ADMIN — ASPIRIN 81 MG CHEWABLE TABLET SCH MG: 81 TABLET CHEWABLE at 09:40

## 2024-07-17 NOTE — P.PN
Subjective





HISTORY OF PRESENT ILLNESS: 





This is a 78-year-old female with a past medical history significant for end-

stage renal disease on hemodialysis, hypertension, hyperlipidemia, diabetes, 

asthma, former nicotine dependence, and morbid obesity. Patient follows in the 

office with Dr. Vitale. We have been asked to see the patient in consultation for

congestive heart failure. 





patient states she was at dialysis yesterday when she had acute onset of rafal

rtness breath and not been able to catch her breath.  She denies any chest pain 

or pressure.  Denies any recent fevers, chills, nausea.  She denies missing any 

dialysis treatments.  Chest x-ray performed which shows no acute cardiopulmonary

processes.  She does have some orthopnea and feels worse lying down.  She states

she has not had a stress test recently and cannot tolerate stress test.  she d

oes still make some urine and was placed on IV Lasix 40 mg daily 8 hours.  EKG 

shows sinus rhythm with PACs and occasional PVCs, left axis deviation, 

nonspecific ST depressions.





7/17/2024


Patient examined this morning at bedside.  Patient currently denies chest pain 

or pressure.  She continues to report shortness of breath although improving 

from yesterday.  She is scheduled to undergo hemodialysis today.  Vital signs 

are stable.





PHYSICAL EXAM: 


VITAL SIGNS: Reviewed.


GENERAL: Well-developed in no acute distress. 


NECK: Supple. No JVD or thyromegaly


LUNGS: Respirations even and unlabored. Lungs essentially clear to auscultation 

bilaterally.


HEART: Regular rate and rhythm.  S1 and S2 heard.  Systolic murmur noted.


EXTREMITIES: Normal range of motion.  No clubbing or cyanosis.  Peripheral 

pulses intact.  2+ bilateral lower extremity edema





ASSESSMENT: 


Shortness of breath


Acute on chronic heart failure with preserved ejection fraction, 55-60%


End-stage renal disease on hemodialysis


Hypertension


Hyperlipidemia


Diabetes


Asthma


Former nicotine dependence


Morbid obesity


History of myocardial infarction, per patient


History of CVA, per patient





PLAN: 


Patient has been transitioned to oral diuretics per nephrology


Continue additional cardiac medications


N.p.o. at midnight


Patient to undergo Lexiscan stress test tomorrow


Further recommendations pending patient course








Nurse practitioner note has been reviewed by physician. Signing provider agrees 

with the documented findings, assessment, and plan of care documented by NP as a

scribe.








Objective





- Vital Signs


Vital signs: 


                                   Vital Signs











Temp  98.4 F   07/17/24 07:00


 


Pulse  70   07/17/24 11:25


 


Resp  16   07/17/24 07:00


 


BP  152/67   07/17/24 07:00


 


Pulse Ox  99   07/17/24 07:00


 


FiO2      








                                 Intake & Output











 07/16/24 07/17/24 07/17/24





 18:59 06:59 18:59


 


Intake Total 920 450 180


 


Output Total 2400  


 


Balance -1480 450 180


 


Weight 102.058 kg  


 


Intake:   


 


  Oral 520 450 180


 


  Hemodialysis 400  


 


Output:   


 


  Hemodialysis 2400  


 


Other:   


 


  # Voids 2 2 














- Labs


CBC & Chem 7: 


                                 07/15/24 15:46





                                 07/15/24 15:46


Labs: 


                  Abnormal Lab Results - Last 24 Hours (Table)











  07/15/24 07/16/24 Range/Units





  15:46 12:35 


 


D-Dimer   1.04 H  (<0.60)  mg/L FEU


 


TIBC  216 L   (228-460)  UG/DL


 


Transferrin  154.0 L   (204.0-354.0)  mg/dL


 


Ferritin  1731.0 H   (10.0-291.0)  ng/mL

## 2024-07-17 NOTE — P.PN
Subjective


Progress Note Date: 07/17/24


History of present illness; 78-year-old female presented to the emergency 

department with difficulty breathing.  Patient states that the ED symptoms began

1 week ago, accompanied by some leg swelling.  Symptoms have progressed since 

that time, patient had gone to her primary care doctor's office in which a chest

x-ray was completed.  She states that chest x-ray showed "fluid in her lungs".  

Her past medical history is significant for asthma, COPD, MI and CKD.  Patient 

states that she has a Ventolin inhaler at home, for which she has been out of 

medication for a little while now however she has not needed a breathing 

treatment.  States that to her memory the last time she needed a breathing 

treatment was approximately 1 year ago.  For her CKD she receives dialysis on 

Mondays, Wednesday and Friday.  She presented to the emergency department from 

her dialysis on Monday because her oxygen saturations were at 90% on room air.


 


Initial lab work done in the ER showed Hgb of 9.9, Hct of 31.3, potassium of 

3.3, BUN of 24, creatinine of 3.05 and alkaline phosphatase of 287.


 


EKG done in the ER showed sinus rhythm with occasional ventricular premature 

complexes with occasional supraventricular premature complexes and possible 

anteroseptal myocardial infarction of indeterminate age.





Chest x-ray done in the ER showed no evidence of acute pulmonary disease.





7/17 -patient was seen at bedside today.  She states she is feeling better today

than she was yesterday, still with some difficulties and notes "she is still not

right", however she has improved.  She received dialysis yesterday, and will be 

receiving again today.  Additionally, she will be undergoing a stress test 

today.  Patient continues to have 2 L on nasal cannula available to her, however

when talking with her this morning she was comfortable without utilizing the 

oxygen.  Patient continues to have a good appetite and is able to use the 

bathroom.  She has no current complaints.





REVIEW OF SYSTEMS: 


CONSTITUTIONAL: No fever, no malaise.


CARDIOVASCULAR: No chest pain, no palpitations, no syncope, orthopnea present.


PULMONARY: Shortness of breath.


GASTROINTESTINAL: No diarrhea, no nausea, no vomiting, no abdominal pain. 


NEUROLOGICAL: No headaches, no weakness, 





PHYSICAL EXAMINATION: 





GENERAL: The patient is alert and oriented x3, not in any acute distress. Well 

developed, well nourished. 


HEENT: Pupils are round and equally reacting to light. EOMI. No scleral icterus.

No conjunctival pallor. Normocephalic, atraumatic. No pharyngeal erythema. No 

thyromegaly. 


CARDIOVASCULAR: S1 and S2 present. No murmurs, rubs, or gallops. 


PULMONARY: Chest is clear to auscultation, no wheezing or crackles, however 

diminished bilaterally.


ABDOMEN: Soft, nontender, nondistended, normoactive bowel sounds. No palpable 

organomegaly. 


MUSCULOSKELETAL: No joint swelling or deformity.


EXTREMITIES: No cyanosis, clubbing, or pedal edema. 


NEUROLOGICAL: Gross neurological examination did not reveal any focal deficits. 


SKIN: No rashes. 





Assessment and plan


1.  Chronic kidney disease, stage V (end-stage chronic kidney disease)


 Creatinine 3.05, her baseline has fluctuated between 3 and 5.5 for the last 

year or so


 BUN is 24, baseline has fluctuated between 18 and 47.4 over the last year or 

so


 GFR less than 15 since May 2023, except for one measure in which her GFR was 

17 on 5/6/2023


 Patient receives dialysis 3 times weekly; Mondays, Wednesday, Friday


 Ordered CBC and CMP


 Hemodialysis today


 Planning for another hemodialysis treatment tomorrow


 PhosLo given for bone mineral density





2.  Megaloblastic anemia


 Previously diagnosed vitamin B12 deficiency on 12/10/2023.


 For which she has been taking cyanocobalamin 1000 mcg PO daily


 Check iron studies





3.  Fluid overload likely as a result of end-stage renal disease vs. CHF


 Patient still been able to produce some urine, placed on IV Lasix 40 mg daily


 Shortness of breath was exacerbated in the middle of her dialysis session on 

7/15, was unable to complete the entirety of


 Chest x-ray done in the ER showed no evidence of acute cardiopulmonary disease


 Shortness of breath treated with Ventolin nebulized at the bedside and 

Pulmicort 0.5 mg


 D-dimer is 1.04 was tested due to relatively high suspicion of possible 

pulmonary embolism, however patient is improving saturations following dialysis 

and Lasix the suspicion for possible pulmonary embolism is much lower.





Chronic medical conditions


Hypertension


 Controlled with Zestril, Norvasc, Lopressor, Apresoline


Hyperlipidemia


 Controlled with rosuvastatin at home, switched to Lipitor in the hospital


Hypothyroidism


 Patient treated with Synthroid





GI prophylaxis -Protonix





Monitor vital signs


Monitor CBC


Monitor BMP





In regards to hypothyroid continue Synthyroid





Labs and medication were reviewed.  Continue with symptomatic treatment.  Resume

home medication.  Monitor labs and vitals.  DVT and GI prophylaxis.  Further 

recommendations as per clinical course of the patient








Dictation was produced using dragon dictation software. please excuse any 

grammatical, word or spelling errors. 











Objective





- Vital Signs


Vital signs: 


                                   Vital Signs











Temp  98.4 F   07/17/24 07:00


 


Pulse  70   07/17/24 08:19


 


Resp  16   07/17/24 07:00


 


BP  152/67   07/17/24 07:00


 


Pulse Ox  99   07/17/24 07:00


 


FiO2      








                                 Intake & Output











 07/16/24 07/17/24 07/17/24





 18:59 06:59 18:59


 


Intake Total 920 450 


 


Output Total 2400  


 


Balance -1480 450 


 


Weight 102.058 kg  


 


Intake:   


 


  Oral 520 450 


 


  Hemodialysis 400  


 


Output:   


 


  Hemodialysis 2400  


 


Other:   


 


  # Voids 2 2 














- Labs


CBC & Chem 7: 


                                 07/15/24 15:46





                                 07/15/24 15:46


Labs: 


                  Abnormal Lab Results - Last 24 Hours (Table)











  07/15/24 07/16/24 Range/Units





  15:46 12:35 


 


D-Dimer   1.04 H  (<0.60)  mg/L FEU


 


TIBC  216 L   (228-460)  UG/DL


 


Transferrin  154.0 L   (204.0-354.0)  mg/dL


 


Ferritin  1731.0 H   (10.0-291.0)  ng/mL

## 2024-07-17 NOTE — CA
Transthoracic Echo Report 

 Name: Brenda Lynn 

 MRN:    [id number] 

 Age:    78     Gender:     F 

 

 :    1945 

 Exam Date:     2024 14:35 

 Exam Location: [facility] 

 Ht (in):     63     Wt (lb):     225 

 Ordering Physician:        Joey Palma DO 

 Attending/Referring Phys: 

 Technician         Phylicia Bond RDCS 

 Procedure CPT: 

 Indications:       Heart failure 

 

 Cardiac Hx:        Dialysis 

 Technical Quality:      Fair 

 Contrast 1:                                Total Dose (mL): 

 Contrast 2:                                Total Dose (mL): 

 

 MEASUREMENTS  (Male / Female) Normal Values 

 2D ECHO 

 LV Diastolic Diameter PLAX        5.3 cm                4.2 - 5.9 / 3.9 - 5.3 cm 

 LV Systolic Diameter PLAX         3.7 cm                 

 IVS Diastolic Thickness           1.2 cm                0.6 - 1.0 / 0.6 - 0.9 cm 

 LVPW Diastolic Thickness          1.5 cm                0.6 - 1.0 / 0.6 - 0.9 cm 

 LV Relative Wall Thickness        0.5                    

 RV Internal Dim ED PLAX           2.4 cm                 

 LVOT Diameter                     1.8 cm                 

 LA Systolic Diameter LX           4.7 cm                3.0 - 4.0 / 2.7 - 3.8 cm 

 LV Diastolic Volume MOD BP        98.0 cm???              67 - 155 / 56 - 104 cm??? 

 LV Systolic Volume MOD BP         33.9 cm???              22 - 58 / 19 - 49 cm??? 

 LV Ejection Fraction MOD BP       65.4 %                >= 55  % 

 LV Cardiac Index MOD BP           2464.3 cm???/min???m???      

 LV Diastolic Volume MOD 4C        99.4 cm???               

 LV Systolic Volume MOD 4C         27.5 cm???               

 LV Ejection Fraction MOD 4C       72.3 %                 

 LV Cardiac Index MOD 4C           2765.9 cm???/min???m???      

 LV Diastolic Length 4C            7.7 cm                 

 LV Systolic Length 4C             5.6 cm                 

 LV Diastolic Volume MOD 2C        96.5 cm???               

 LV Systolic Volume MOD 2C         35.6 cm???               

 LV Ejection Fraction MOD 2C       63.1 %                 

 LV Cardiac Index MOD 2C           2342.6 cm???/min???m???      

 LV Diastolic Length 2C            7.7 cm                 

 LV Systolic Length 2C             6.5 cm                 

 

 M-MODE 

 Aortic Root Diameter MM           2.9 cm                 

 LA Systolic Diameter MM           4.5 cm                 

 LA Ao Ratio MM                    1.6                    

 AV Cusp Separation MM             1.2 cm                 

 

 DOPPLER 

 AV Peak Velocity                  264.1 cm/s             

 AV Peak Gradient                  27.9 mmHg              

 AV Mean Velocity                  175.5 cm/s             

 AV Mean Gradient                  14.2 mmHg              

 AV Velocity Time Integral         54.6 cm                

 LVOT Peak Velocity                134.6 cm/s             

 LVOT Peak Gradient                7.2 mmHg               

 LVOT Velocity Time Integral       34.3 cm                

 LVOT Stroke Volume                85.9 cm???               

 LVOT Stroke Volume Index          42.2 ml/m???             

 LVOT Cardiac Index                3304.8 cm???/min???m???      

 AV Area Cont Eq vti               1.6 cm???                

 AV Area Cont Eq pk                1.3 cm???                

 Mitral E Point Velocity           133.3 cm/s             

 Mitral A Point Velocity           141.9 cm/s             

 Mitral E to A Ratio               0.9                    

 MV Deceleration Time              263.5 ms               

 MV E' Velocity                    5.4 cm/s               

 Mitral E to MV E' Ratio           24.5                   

 TR Peak Velocity                  290.6 cm/s             

 TR Peak Gradient                  33.8 mmHg              

 

 

 FINDINGS 

 Left Ventricle 

 Left ventricular ejection fraction is estimated at 55-60 %.  Mildly increased  

 septal wall thickness. Moderately increased posterior wall thickness. No  

 obvious regional wall motion abnormalities. Left ventricular cavity size  

 normal. 

 

 Right Ventricle 

 Normal right ventricular size and function. Right ventricular systolic pressure  

 within normal limits. 

 

 Right Atrium 

 Mild right atrial dilatation. 

 

 Left Atrium 

 Moderate to severely increased left atrial diameter. 

 

 Mitral Valve 

 Structurally normal mitral valve. Mild mitral regurgitation. No mitral  

 stenosis. 

 

 Aortic Valve 

 Aortic valve not well visualized. Mild aortic stenosis with a peak gradient of  

 28mmHg and a mean gradient of 14mmHg. No aortic regurgitation. 

 

 Tricuspid Valve 

 Structurally normal tricuspid valve. Mild tricuspid regurgitation. No tricuspid  

 stenosis. 

 

 Pulmonic Valve 

 Structurally normal pulmonic valve. No pulmonic stenosis. Trace pulmonic  

 regurgitation. 

 

 Pericardium 

 No pericardial or pleural effusion. 

 

 Aorta 

 Normal size aortic root and proximal ascending aorta. 

 

 CONCLUSIONS 

 Left trigger ejection fraction 55-60% 

 RVSP 34 

 Moderate to severely dilated left atrium 

 Mild mitral regurgitation 

 Mild aortic stenosis 

 Mild tricuspid regurgitation 

 Previewed by:  

 Dr. Sergio Hdz DO 

 (Electronically Signed) 

 Final Date:      2024 10:42

## 2024-07-17 NOTE — P.PN
Subjective





Patient is seen in follow-up for end-stage renal disease.  She is maintained on 

hemodialysis on Monday Wednesday Friday schedule.  Tolerating dialysis well.  No

active complaints.





Vital signs are stable.


General: No acute distress.


HEENT: Head exam is unremarkable.


LUNGS: No audible rhonchi or wheezes.


HEART: Rate and Rhythm are regular. 


ABDOMEN: Obese, nontender.


EXTREMITITES: No edema.





Objective





- Vital Signs


Vital signs: 


                                   Vital Signs











Temp  98.4 F   07/17/24 07:00


 


Pulse  70   07/17/24 11:25


 


Resp  16   07/17/24 07:00


 


BP  152/67   07/17/24 07:00


 


Pulse Ox  99   07/17/24 07:00


 


FiO2      








                                 Intake & Output











 07/16/24 07/17/24 07/17/24





 18:59 06:59 18:59


 


Intake Total 920 450 180


 


Output Total 2400  


 


Balance -1480 450 180


 


Weight 102.058 kg  


 


Intake:   


 


  Oral 520 450 180


 


  Hemodialysis 400  


 


Output:   


 


  Hemodialysis 2400  


 


Other:   


 


  # Voids 2 2 














- Labs


CBC & Chem 7: 


                                 07/15/24 15:46





                                 07/15/24 15:46


Labs: 


                  Abnormal Lab Results - Last 24 Hours (Table)











  07/15/24 07/16/24 Range/Units





  15:46 12:35 


 


D-Dimer   1.04 H  (<0.60)  mg/L FEU


 


TIBC  216 L   (228-460)  UG/DL


 


Transferrin  154.0 L   (204.0-354.0)  mg/dL


 


Ferritin  1731.0 H   (10.0-291.0)  ng/mL














Assessment and Plan


Plan: 





Assessment:


1.  End-stage renal disease maintained on hemodialysis on Monday Wednesday Friday schedule.


2.  Volume overload.  Improved with ultrafiltration.


3.  Hypertension with chronic kidney disease.


4.  Hypokalemia.  This was checked shortly after dialysis and will be repeated.


5.  Anemia of chronic kidney disease.  Iron replete.


6.  Chronic kidney disease mineral bone disease.


7.  History of hemorrhagic CVA.


8.  Chronic diastolic CHF with mild mitral regurgitation, aortic stenosis and 

tricuspid regurgitation.





Plan:


Currently seen while undergoing hemodialysis.  Challenge UF.  Tolerated 2 L 

ultrafiltration yesterday.


Add Aranesp.


Stop IV Lasix.


Add torsemide 40 mg once daily.

## 2024-07-18 LAB
ANION GAP SERPL CALC-SCNC: 12.8 MMOL/L (ref 4–12)
BASOPHILS # BLD AUTO: 0.01 X 10*3/UL (ref 0–0.1)
BASOPHILS NFR BLD AUTO: 0.2 %
BUN SERPL-SCNC: 15.9 MG/DL (ref 9–27)
BUN/CREAT SERPL: 7.23 RATIO (ref 12–20)
CALCIUM SPEC-MCNC: 8.5 MG/DL (ref 8.7–10.3)
CHLORIDE SERPL-SCNC: 96 MMOL/L (ref 96–109)
CO2 SERPL-SCNC: 27.2 MMOL/L (ref 21.6–31.8)
EOSINOPHIL # BLD AUTO: 0.2 X 10*3/UL (ref 0.04–0.35)
EOSINOPHIL NFR BLD AUTO: 3.1 %
ERYTHROCYTE [DISTWIDTH] IN BLOOD BY AUTOMATED COUNT: 2.9 X 10*6/UL (ref 4.1–5.2)
ERYTHROCYTE [DISTWIDTH] IN BLOOD: 13.6 % (ref 11.5–14.5)
GLUCOSE SERPL-MCNC: 125 MG/DL (ref 70–110)
HCT VFR BLD AUTO: 29.9 % (ref 37.2–46.3)
HGB BLD-MCNC: 9.6 G/DL (ref 12–15)
IMM GRANULOCYTES BLD QL AUTO: 0.5 %
LYMPHOCYTES # SPEC AUTO: 0.98 X 10*3/UL (ref 0.9–5)
LYMPHOCYTES NFR SPEC AUTO: 15.1 %
MAGNESIUM SPEC-SCNC: 1.8 MG/DL (ref 1.5–2.4)
MCH RBC QN AUTO: 33.1 PG (ref 27–32)
MCHC RBC AUTO-ENTMCNC: 32.1 G/DL (ref 32–37)
MCV RBC AUTO: 103.1 FL (ref 80–97)
MONOCYTES # BLD AUTO: 0.75 X 10*3/UL (ref 0.2–1)
MONOCYTES NFR BLD AUTO: 11.6 %
NEUTROPHILS # BLD AUTO: 4.5 X 10*3/UL (ref 1.8–7.7)
NEUTROPHILS NFR BLD AUTO: 69.5 %
NRBC BLD AUTO-RTO: 0 X 10*3/UL (ref 0–0.01)
PLATELET # BLD AUTO: 173 X 10*3/UL (ref 140–440)
POTASSIUM SERPL-SCNC: 3.6 MMOL/L (ref 3.5–5.5)
SODIUM SERPL-SCNC: 136 MMOL/L (ref 135–145)
VIT B12 SERPL-MCNC: 1178 PG/ML (ref 200–944)
WBC # BLD AUTO: 6.47 X 10*3/UL (ref 4.5–10)

## 2024-07-18 RX ADMIN — TORSEMIDE SCH MG: 20 TABLET ORAL at 12:22

## 2024-07-18 NOTE — NM
EXAMINATION TYPE: NM stress lexiscan cardiolite

 

DATE OF EXAM: 7/18/2024

 

COMPARISON: NONE

 

HISTORY: Chest pain

 

TECHNIQUE:  After the intravenous administration of 10.7 mCi Tc 99m Sestamibi - Cardiolite resting SP
ECT images acquired 45 minutes post injection. 

 

At peak stress 25.4 mCi Tc 99m Sestamibi - Stress images obtained 65 minutes post injection 

 

The patient was stressed with 0.4mg Lexiscan. 

FINDINGS:  

 

There is diminished radiotracer accumulation along the inferior wall on both rest and stress images. 
This appears to be matched. Correlate for prior infarct. Potentially this could be artifact from diap
hragm.

 

Wall motion is normal, including the inferior wall

Ejection fraction is calculated to be 52 %.

 

 

 

IMPRESSION:  

1. Defect along the inferior wall. Artifact is favored. Prior infarct could be considered.

2. No stress-induced ischemic changes.

## 2024-07-18 NOTE — CA
Lexiscan Nuclear Stress Test Report 

 

 Name:    Brenda Lynn 

 

 MRN:    [id number] 

 

 

 

 Exam Date: 2024 10:15 

 

 Exam Location:      [facility]  

                     Stress 

 

 Ht (in):     63     Wt (lb):     225    BSA:    2.03 

 

 Ordering Phys:       Lisa Tim 

 

 Referring Phys:      LISA TIM,, 

 

 Technologist:        Juan Ramon Courtney 

 

 Age:    78    Gender:    F 

 

 :    1945 

 Procedure CPT: 

 

 Indications:              Reflex order-Stress test 

 

 ICD-10 Codes: 

 

 Patient History: 

 

 Medications:              SEE LIST 

 

 Meds past 24 hrs: 

 

 Pretest Chest Pain: 

 

 STRESS TEST      Lexiscan 

 

 Protocol 

 

 

 

 

 Exercise Duration (min:sec):         02:00 

 Max ST Depressions (mm): 

 Angina Score: 

 Trevizo Score: 

 Resting HR (bpm):      66 

 

 Peak HR (bpm):         85 

 

 Resting BP (mmHg):       134    /   57 

 

 Peak BP (mmHg):       122   /   50 

 

 MPHR:    143     Target HR:      122 

 

 % MPHR:     59 

 METS:  1.0 

 

 Total Dose: 

 Peak Dose: 

 Atropine: 

 Double Product:       80854 

 

 BP Response: 

 

 Stress Termination:       PROTOCOL COMPLETE 

 

 Stress Symptoms: 

 NO SYMPTOMS 

 

 Stress Summary: 

 

 

  ECG ANALYSIS 

 

 Resting ECG: 

 

 Stress ECG: 

 

 CONCLUSIONS 

 At baseline EKG showed normal sinus rhythm, normal axis, poor R- 

 wave progression without significant ST or T wave abnormalities.   

 Patient recieved IV infusion of Lexiscan 0.4mg and at peak  

 infusion EKG showed no significant change from baseline. 

 

 Conclusions: 

 1. Normal EKG response to Lexiscan infusion 

 2. Nuclear imaging to be reported separately. 

 

 Dr. Sergio Hdz DO 

 (Electronically Signed) 

 Final Date:      2024 12:52

## 2024-07-18 NOTE — P.PN
Subjective


Progress Note Date: 07/18/24


History of present illness; 78-year-old female presented to the emergency 

department with difficulty breathing.  Patient states that the ED symptoms began

1 week ago, accompanied by some leg swelling.  Symptoms have progressed since 

that time, patient had gone to her primary care doctor's office in which a chest

x-ray was completed.  She states that chest x-ray showed "fluid in her lungs".  

Her past medical history is significant for asthma, COPD, MI and CKD.  Patient 

states that she has a Ventolin inhaler at home, for which she has been out of 

medication for a little while now however she has not needed a breathing 

treatment.  States that to her memory the last time she needed a breathing 

treatment was approximately 1 year ago.  For her CKD she receives dialysis on 

Mondays, Wednesday and Friday.  She presented to the emergency department from 

her dialysis on Monday because her oxygen saturations were at 90% on room air.


 


Initial lab work done in the ER showed Hgb of 9.9, Hct of 31.3, potassium of 

3.3, BUN of 24, creatinine of 3.05 and alkaline phosphatase of 287.


 


EKG done in the ER showed sinus rhythm with occasional ventricular premature 

complexes with occasional supraventricular premature complexes and possible 

anteroseptal myocardial infarction of indeterminate age.





Chest x-ray done in the ER showed no evidence of acute pulmonary disease.





7/17 -patient was seen at bedside today.  She states she is feeling better today

than she was yesterday, still with some difficulties and notes "she is still not

right", however she has improved.  She received dialysis yesterday, and will be 

receiving again today.  Additionally, she will be undergoing a stress test 

today.  Patient continues to have 2 L on nasal cannula available to her, however

when talking with her this morning she was comfortable without utilizing the 

oxygen.  Patient continues to have a good appetite and is able to use the 

bathroom.  She has no current complaints.





7/18 -patient seen at bedside today following her stress test.  She states that 

she has not been feeling well since she finished, with increased nausea and 

vomiting around lunchtime.  She is continue to feel very tired and overall not 

great since completing the stress test. Per cardiology stress test was completed

and there was normal EKG response to the Lexiscan infusion.  Nuclear imaging 

scan there is a defect along the inferior wall, prior infarct could be 

considered, the favored thought is that what was found is artifact.  There were 

no stress-induced ischemic changes noted.  As a result of how she is feeling 

following the stress test we will attempt discharge tomorrow when the patient 

feels more comfortable.





REVIEW OF SYSTEMS: 


CONSTITUTIONAL: No fever, no malaise.


CARDIOVASCULAR: No chest pain, no palpitations, no syncope, orthopnea present.


PULMONARY: Shortness of breath.


GASTROINTESTINAL: No diarrhea, no nausea, no vomiting, no abdominal pain. 


NEUROLOGICAL: No headaches, no weakness, 





PHYSICAL EXAMINATION: 





GENERAL: The patient is alert and oriented x3, not in any acute distress. Well 

developed, well nourished. 


HEENT: Pupils are round and equally reacting to light. EOMI. No scleral icterus.

No conjunctival pallor. Normocephalic, atraumatic. No pharyngeal erythema. No 

thyromegaly. 


CARDIOVASCULAR: S1 and S2 present. No murmurs, rubs, or gallops. 


PULMONARY: Chest is clear to auscultation, no wheezing or crackles, however 

diminished bilaterally.


ABDOMEN: Soft, nontender, nondistended, normoactive bowel sounds. No palpable 

organomegaly. 


MUSCULOSKELETAL: No joint swelling or deformity.


EXTREMITIES: No cyanosis, clubbing, or pedal edema. 


NEUROLOGICAL: Gross neurological examination did not reveal any focal deficits. 


SKIN: No rashes. 





Assessment and plan


1.  Chronic kidney disease, stage V (end-stage chronic kidney disease)


 Creatinine 3.05, her baseline has fluctuated between 3 and 5.5 for the last 

year or so


 BUN is 24, baseline has fluctuated between 18 and 47.4 over the last year or 

so


 GFR less than 15 since May 2023, except for one measure in which her GFR was 

17 on 5/6/2023


 Patient receives dialysis 3 times weekly; Mondays, Wednesday, Friday


 Ordered CBC and CMP


 Hemodialysis today


 Planning for another hemodialysis treatment tomorrow


 PhosLo given for bone mineral density


 Taking Aranesp and Demadex per nephrology





2.  Megaloblastic anemia


 Previously diagnosed vitamin B12 deficiency on 12/10/2023.


 For which she has been taking cyanocobalamin 1000 mcg PO daily


 Check iron studies





3.  Fluid overload likely as a result of end-stage renal disease vs. CHF


 Patient still been able to produce some urine, placed on IV Lasix 40 mg daily


 Shortness of breath was exacerbated in the middle of her dialysis session on 

7/15, was unable to complete the entirety of


 Chest x-ray done in the ER showed no evidence of acute cardiopulmonary disease


 Shortness of breath treated with Ventolin nebulized at the bedside and 

Pulmicort 0.5 mg


 D-dimer is 1.04 was tested due to relatively high suspicion of possible 

pulmonary embolism, however patient is improving saturations following dialysis 

and Lasix the suspicion for possible pulmonary embolism is much lower.


 Cardiac stress test completed today; per cardiology there was normal EKG 

response to the Lexiscan infusion.  Per nuclear medicine, there is a defect 

along the inferior wall noted possibly artifact as a result of a prior infarct, 

and no stress-induced ischemic changes were noted.





Chronic medical conditions


Hypertension


 Controlled with Zestril, Norvasc, Lopressor, Apresoline


Hyperlipidemia


 Controlled with rosuvastatin at home, switched to Lipitor in the hospital


Hypothyroidism


 Patient treated with Synthroid





GI prophylaxis -Protonix





Monitor vital signs





In regards to hypothyroid continue Synthyroid





Labs and medication were reviewed.  Continue with symptomatic treatment.  Resume

home medication.  Monitor labs and vitals.  DVT and GI prophylaxis.  Further 

recommendations as per clinical course of the patient








Dictation was produced using dragon dictation software. please excuse any 

grammatical, word or spelling errors. 











Objective





- Vital Signs


Vital signs: 


                                   Vital Signs











Temp  98.2 F   07/18/24 07:00


 


Pulse  80   07/18/24 08:57


 


Resp  15   07/18/24 07:00


 


BP  143/72   07/18/24 07:00


 


Pulse Ox  97   07/18/24 07:00


 


FiO2      








                                 Intake & Output











 07/17/24 07/18/24 07/18/24





 18:59 06:59 18:59


 


Intake Total 934 120 


 


Output Total 2000  


 


Balance -1066 120 


 


Intake:   


 


  Oral 534 120 


 


  Hemodialysis 400  


 


Output:   


 


  Hemodialysis 2000  


 


Other:   


 


  # Voids 3 1 


 


  # Bowel Movements  0 














- Labs


CBC & Chem 7: 


                                 07/17/24 16:58





                                 07/17/24 16:58


Labs: 


                  Abnormal Lab Results - Last 24 Hours (Table)











  07/17/24 07/17/24 Range/Units





  16:58 16:58 


 


RBC   2.90 L  (4.10-5.20)  X 10*6/uL


 


Hgb   9.6 L  (12.0-15.0)  g/dL


 


Hct   29.9 L  (37.2-46.3)  %


 


MCV   103.1 H  (80.0-97.0)  FL


 


MCH   33.1 H  (27.0-32.0)  pg


 


Anion Gap  12.80 H   (4.00-12.00)  mmol/L


 


Creatinine  2.2 H   (0.6-1.5)  mg/dL


 


Est GFR (CKD-EPI)  23 L   (>=60)   


 


BUN/Creatinine Ratio  7.23 L   (12.00-20.00)  Ratio


 


Glucose  125 H   ()  mg/dL


 


Calcium  8.5 L   (8.7-10.3)  mg/dL


 


Vitamin B12  1178.0 H   (200.0-944.0)  pg/mL

## 2024-07-18 NOTE — P.PN
Subjective





Patient is seen in follow-up for end-stage renal disease.  She is maintained on 

hemodialysis on Monday Wednesday Friday schedule.  No problems with dialysis 

yesterday.  Tolerated nearly 2 L ultrafiltration yesterday.





Vital signs are stable.


General: No acute distress.


HEENT: Head exam is unremarkable.


LUNGS: No audible rhonchi or wheezes.


HEART: Rate and Rhythm are regular. 


ABDOMEN: Obese, nontender.


EXTREMITITES: No edema.





Objective





- Vital Signs


Vital signs: 


                                   Vital Signs











Temp  98.2 F   07/18/24 07:00


 


Pulse  80   07/18/24 08:57


 


Resp  15   07/18/24 07:00


 


BP  143/72   07/18/24 07:00


 


Pulse Ox  97   07/18/24 07:00


 


FiO2      








                                 Intake & Output











 07/17/24 07/18/24 07/18/24





 18:59 06:59 18:59


 


Intake Total 934 120 


 


Output Total 2000  


 


Balance -1066 120 


 


Intake:   


 


  Oral 534 120 


 


  Hemodialysis 400  


 


Output:   


 


  Hemodialysis 2000  


 


Other:   


 


  Voiding Method   Toilet


 


  # Voids 3 1 


 


  # Bowel Movements  0 














- Labs


CBC & Chem 7: 


                                 07/17/24 16:58





                                 07/17/24 16:58


Labs: 


                  Abnormal Lab Results - Last 24 Hours (Table)











  07/17/24 07/17/24 Range/Units





  16:58 16:58 


 


RBC   2.90 L  (4.10-5.20)  X 10*6/uL


 


Hgb   9.6 L  (12.0-15.0)  g/dL


 


Hct   29.9 L  (37.2-46.3)  %


 


MCV   103.1 H  (80.0-97.0)  FL


 


MCH   33.1 H  (27.0-32.0)  pg


 


Anion Gap  12.80 H   (4.00-12.00)  mmol/L


 


Creatinine  2.2 H   (0.6-1.5)  mg/dL


 


Est GFR (CKD-EPI)  23 L   (>=60)   


 


BUN/Creatinine Ratio  7.23 L   (12.00-20.00)  Ratio


 


Glucose  125 H   ()  mg/dL


 


Calcium  8.5 L   (8.7-10.3)  mg/dL


 


Vitamin B12  1178.0 H   (200.0-944.0)  pg/mL














Assessment and Plan


Plan: 





Assessment:


1.  End-stage renal disease maintained on hemodialysis on Monday Wednesday Friday schedule.


2.  Volume overload.  Improved with ultrafiltration.


3.  Hypertension with chronic kidney disease.  Stable.


4.  Hypokalemia.  This was checked shortly after dialysis.  Better today.


5.  Anemia of chronic kidney disease.  Iron replete.  On Aranesp.


6.  Chronic kidney disease mineral bone disease.  On PhosLo.


7.  History of hemorrhagic CVA.


8.  Chronic diastolic CHF with mild mitral regurgitation, aortic stenosis and 

tricuspid regurgitation.





Plan:


Hemodialysis tomorrow.


Maintain torsemide.


Stress test today.

## 2024-07-18 NOTE — P.PN
Subjective





HISTORY OF PRESENT ILLNESS: 





This is a 78-year-old female with a past medical history significant for end-

stage renal disease on hemodialysis, hypertension, hyperlipidemia, diabetes, 

asthma, former nicotine dependence, and morbid obesity. Patient follows in the 

office with Dr. Vitale. We have been asked to see the patient in consultation for

congestive heart failure. 





patient states she was at dialysis yesterday when she had acute onset of rafal

rtness breath and not been able to catch her breath.  She denies any chest pain 

or pressure.  Denies any recent fevers, chills, nausea.  She denies missing any 

dialysis treatments.  Chest x-ray performed which shows no acute cardiopulmonary

processes.  She does have some orthopnea and feels worse lying down.  She states

she has not had a stress test recently and cannot tolerate stress test.  she d

oes still make some urine and was placed on IV Lasix 40 mg daily 8 hours.  EKG 

shows sinus rhythm with PACs and occasional PVCs, left axis deviation, 

nonspecific ST depressions.





7/17/2024


Patient examined this morning at bedside.  Patient currently denies chest pain 

or pressure.  She continues to report shortness of breath although improving 

from yesterday.  She is scheduled to undergo hemodialysis today.  Vital signs 

are stable.





7/18


patient seen and examined.  Patient denies chest pain or pressure.  Still feels 

short of breath however feeling somewhat better.  Scheduled for stress test lisa mckinney.





PHYSICAL EXAM: 


VITAL SIGNS: Reviewed.


GENERAL: Well-developed in no acute distress. 


NECK: Supple. No JVD or thyromegaly


LUNGS: Respirations even and unlabored. Lungs essentially clear to auscultation 

bilaterally.


HEART: Regular rate and rhythm.  S1 and S2 heard.  Systolic murmur noted.


EXTREMITIES: Normal range of motion.  No clubbing or cyanosis.  Peripheral 

pulses intact.  2+ bilateral lower extremity edema





ASSESSMENT: 


Shortness of breath


Acute on chronic heart failure with preserved ejection fraction, 55-60%


End-stage renal disease on hemodialysis


Hypertension


Hyperlipidemia


Diabetes


Asthma


Former nicotine dependence


Morbid obesity


History of myocardial infarction, per patient


History of CVA, per patient





PLAN: 


Await stress test however most of her symptoms are more shortness breath and 

multifactorial.  Continue with current regimen.  Dialysis per nephrology.  

monitor clinical response








Objective





- Vital Signs


Vital signs: 


                                   Vital Signs











Temp  98.2 F   07/18/24 07:00


 


Pulse  80   07/18/24 08:57


 


Resp  15   07/18/24 07:00


 


BP  143/72   07/18/24 07:00


 


Pulse Ox  97   07/18/24 07:00


 


FiO2      








                                 Intake & Output











 07/17/24 07/18/24 07/18/24





 18:59 06:59 18:59


 


Intake Total 934 120 


 


Output Total 2000  


 


Balance -1066 120 


 


Intake:   


 


  Oral 534 120 


 


  Hemodialysis 400  


 


Output:   


 


  Hemodialysis 2000  


 


Other:   


 


  Voiding Method   Toilet


 


  # Voids 3 1 


 


  # Bowel Movements  0 














- Labs


CBC & Chem 7: 


                                 07/17/24 16:58





                                 07/17/24 16:58


Labs: 


                  Abnormal Lab Results - Last 24 Hours (Table)











  07/17/24 07/17/24 Range/Units





  16:58 16:58 


 


RBC   2.90 L  (4.10-5.20)  X 10*6/uL


 


Hgb   9.6 L  (12.0-15.0)  g/dL


 


Hct   29.9 L  (37.2-46.3)  %


 


MCV   103.1 H  (80.0-97.0)  FL


 


MCH   33.1 H  (27.0-32.0)  pg


 


Anion Gap  12.80 H   (4.00-12.00)  mmol/L


 


Creatinine  2.2 H   (0.6-1.5)  mg/dL


 


Est GFR (CKD-EPI)  23 L   (>=60)   


 


BUN/Creatinine Ratio  7.23 L   (12.00-20.00)  Ratio


 


Glucose  125 H   ()  mg/dL


 


Calcium  8.5 L   (8.7-10.3)  mg/dL


 


Vitamin B12  1178.0 H   (200.0-944.0)  pg/mL

## 2024-07-19 VITALS
RESPIRATION RATE: 19 BRPM | HEART RATE: 76 BPM | DIASTOLIC BLOOD PRESSURE: 56 MMHG | TEMPERATURE: 99 F | SYSTOLIC BLOOD PRESSURE: 119 MMHG

## 2024-07-19 RX ADMIN — ONDANSETRON PRN MG: 2 INJECTION INTRAMUSCULAR; INTRAVENOUS at 08:17

## 2024-07-19 NOTE — P.PN
Subjective





HISTORY OF PRESENT ILLNESS: 





This is a 78-year-old female with a past medical history significant for end-

stage renal disease on hemodialysis, hypertension, hyperlipidemia, diabetes, 

asthma, former nicotine dependence, and morbid obesity. Patient follows in the 

office with Dr. Vitale. We have been asked to see the patient in consultation for

congestive heart failure. 





patient states she was at dialysis yesterday when she had acute onset of rafal

rtness breath and not been able to catch her breath.  She denies any chest pain 

or pressure.  Denies any recent fevers, chills, nausea.  She denies missing any 

dialysis treatments.  Chest x-ray performed which shows no acute cardiopulmonary

processes.  She does have some orthopnea and feels worse lying down.  She states

she has not had a stress test recently and cannot tolerate stress test.  she d

oes still make some urine and was placed on IV Lasix 40 mg daily 8 hours.  EKG 

shows sinus rhythm with PACs and occasional PVCs, left axis deviation, 

nonspecific ST depressions.





7/17/2024


Patient examined this morning at bedside.  Patient currently denies chest pain 

or pressure.  She continues to report shortness of breath although improving 

from yesterday.  She is scheduled to undergo hemodialysis today.  Vital signs 

are stable.





07/19/2024


Patient examined this point the bedside.  She a Lexiscan stress test yesterday 

which was negative for ischemia.  She states she is scheduled to have dialysis 

this morning.  She denies any chest pain or pressure.  She denies any shortness 

of breath.  Vital signs are stable.





PHYSICAL EXAM: 


VITAL SIGNS: Reviewed.


GENERAL: Well-developed in no acute distress. 


NECK: Supple. No JVD or thyromegaly


LUNGS: Respirations even and unlabored. Lungs essentially clear to auscultation 

bilaterally.


HEART: Regular rate and rhythm.  S1 and S2 heard.  Systolic murmur noted.


EXTREMITIES: Normal range of motion.  No clubbing or cyanosis.  Peripheral 

pulses intact.  trace bilateral lower extremity edema





ASSESSMENT: 


Shortness of breath


Acute on chronic heart failure with preserved ejection fraction, 55-60%


End-stage renal disease on hemodialysis


Hypertension


Hyperlipidemia


Diabetes


Asthma


Former nicotine dependence


Morbid obesity


History of myocardial infarction, per patient


History of CVA, per patient





PLAN: 


continue current cardiac medications


Patient is stable for discharge home today from a cardiac standpoint


We will sign off.  Please reconsult if needed.








Nurse practitioner note has been reviewed by physician. Signing provider agrees 

with the documented findings, assessment, and plan of care documented by NP as a

scribe.








Objective





- Vital Signs


Vital signs: 


                                   Vital Signs











Temp  98.2 F   07/19/24 08:00


 


Pulse  76   07/19/24 08:00


 


Resp  16   07/19/24 08:00


 


BP  126/65   07/19/24 08:00


 


Pulse Ox  95   07/19/24 08:00


 


FiO2      








                                 Intake & Output











 07/18/24 07/19/24 07/19/24





 18:59 06:59 18:59


 


Intake Total 472  


 


Balance 472  


 


Intake:   


 


  Oral 472  


 


Other:   


 


  Voiding Method Toilet Toilet 


 


  # Voids 0 0 1


 


  # Bowel Movements  0 














- Labs


CBC & Chem 7: 


                                 07/17/24 16:58





                                 07/17/24 16:58

## 2024-07-19 NOTE — P.PN
Subjective





Patient is seen in follow-up for end-stage renal disease.  She is maintained on 

hemodialysis on Monday Wednesday Friday schedule.  Tolerating dialysis well. no 

active complaints at this time.





Vital signs are stable.


General: No acute distress.


HEENT: Head exam is unremarkable.


LUNGS: No audible rhonchi or wheezes.


HEART: Rate and Rhythm are regular. 


ABDOMEN: Obese, nontender.


EXTREMITITES: No edema.





Objective





- Vital Signs


Vital signs: 


                                   Vital Signs











Temp  98.2 F   07/19/24 08:00


 


Pulse  76   07/19/24 08:00


 


Resp  16   07/19/24 08:00


 


BP  126/65   07/19/24 08:00


 


Pulse Ox  95   07/19/24 08:00


 


FiO2      








                                 Intake & Output











 07/18/24 07/19/24 07/19/24





 18:59 06:59 18:59


 


Intake Total 472  


 


Balance 472  


 


Intake:   


 


  Oral 472  


 


Other:   


 


  Voiding Method Toilet Toilet 


 


  # Voids 0 0 1


 


  # Bowel Movements  0 














- Labs


CBC & Chem 7: 


                                 07/17/24 16:58





                                 07/17/24 16:58





Assessment and Plan


Plan: 





Assessment:


1.  End-stage renal disease maintained on hemodialysis on Monday Wednesday Friday schedule.


2.  Volume overload.  Improved with ultrafiltration.


3.  Hypertension with chronic kidney disease.  Stable.


4.  Hypokalemia.  This was checked shortly after dialysis.  improved.


5.  Anemia of chronic kidney disease.  Iron replete.  On Aranesp.


6.  Chronic kidney disease mineral bone disease.  On PhosLo.


7.  History of hemorrhagic CVA.


8.  Chronic diastolic CHF with mild mitral regurgitation, aortic stenosis and 

tricuspid regurgitation. stress test negative for ischemia this admission.





Plan:


currently seen while undergoing hemodialysis.


Hemodialysis treatment on Monday.


Maintain torsemide.


plan for discharge home today.

## 2024-07-19 NOTE — P.DS
Providers


Date of admission: 


07/17/24 14:04





Attending physician: 


Wilfredo Thomas





Consults: 





                                        





07/16/24 11:26


Consult Physician Routine 


   Consulting Provider: Emilia Estrada


   Consult Reason/Comments: ESRD


   Do you want consulting provider notified?: Yes











Primary care physician: 


[facility]





[facility] Course: 


1.Fluid overload likely as a result of end-stage renal disease with volume 

overload, there may be a competent of chronic diastolic dysfunction with mild 

acute exacerbation


 Patient still been able to produce some urine, placed on IV Lasix 40 mg daily


 Shortness of breath was exacerbated in the middle of her dialysis session on 

7/15, was unable to complete the entirety of


 Chest x-ray done in the ER showed no evidence of acute cardiopulmonary disease


 Shortness of breath treated with Ventolin nebulized at the bedside and 

Pulmicort 0.5 mg


 D-dimer is 1.04 was tested due to relatively high suspicion of possible 

pulmonary embolism, however patient is improving saturations following dialysis 

and Lasix the suspicion for possible pulmonary embolism is much lower.


 Cardiac stress test completed today; per cardiology there was normal EKG 

response to the Lexiscan infusion.  Per nuclear medicine, there is a defect 

along the inferior wall noted possibly artifact as a result of a prior infarct, 

and no stress-induced ischemic changes were noted.  





2.  Megaloblastic anemia


 Previously diagnosed vitamin B12 deficiency on 12/10/2023.


 For which she has been taking cyanocobalamin 1000 mcg PO daily


 Check iron studies





3.  Chronic kidney disease, stage V (end-stage chronic kidney disease)


 Creatinine 3.05, her baseline has fluctuated between 3 and 5.5 for the last 

year or so


 BUN is 24, baseline has fluctuated between 18 and 47.4 over the last year or 

so


 GFR less than 15 since May 2023, except for one measure in which her GFR was 

17 on 5/6/2023


 Patient receives dialysis 3 times weekly; Mondays, Wednesday, Friday


 Ordered CBC and CMP


 Hemodialysis today


 Planning for another hemodialysis treatment tomorrow


 PhosLo given for bone mineral density


 Taking Aranesp and Demadex per nephrology





Chronic medical conditions


Hypertension


 Controlled with Zestril, Norvasc, Lopressor, Apresoline


Hyperlipidemia


 Controlled with rosuvastatin at home, switched to Lipitor in the hospital


Hypothyroidism


 Patient treated with Synthroid





GI prophylaxis -Protonix





Hospital Course:


78-year-old female presented to the emergency department with difficulty 

breathing.  Patient states that the ED symptoms began 1 week ago, accompanied by

some leg swelling.  Symptoms have progressed since that time, patient had gone 

to her primary care doctor's office in which a chest x-ray was completed.  She 

states that chest x-ray showed "fluid in her lungs".  Her past medical history 

is significant for asthma, COPD, MI and CKD.  Patient states that she has a 

Ventolin inhaler at home, for which she has been out of medication for a little 

while now however she has not needed a breathing treatment.  States that to her 

memory the last time she needed a breathing treatment was approximately 1 year 

ago.  For her CKD she receives dialysis on Mondays, Wednesday and Friday.  She 

presented to the emergency department from her dialysis on Monday because her 

oxygen saturations were at 90% on room air.


 


Initial lab work done in the ER showed Hgb of 9.9, Hct of 31.3, potassium of 

3.3, BUN of 24, creatinine of 3.05 and alkaline phosphatase of 287.


 


EKG done in the ER showed sinus rhythm with occasional ventricular premature 

complexes with occasional supraventricular premature complexes and possible 

anteroseptal myocardial infarction of indeterminate age.





Chest x-ray done in the ER showed no evidence of acute pulmonary disease.





7/17 -patient was seen at bedside today.  She states she is feeling better today

than she was yesterday, still with some difficulties and notes "she is still not

right", however she has improved.  She received dialysis yesterday, and will be 

receiving again today.  Additionally, she will be undergoing a stress test 

today.  Patient continues to have 2 L on nasal cannula available to her, however

when talking with her this morning she was comfortable without utilizing the 

oxygen.  Patient continues to have a good appetite and is able to use the 

bathroom.  She has no current complaints.





7/18 -patient seen at bedside today following her stress test.  She states that 

she has not been feeling well since she finished, with increased nausea and 

vomiting around lunchtime.  She is continue to feel very tired and overall not 

great since completing the stress test. Per cardiology stress test was completed

and there was normal EKG response to the Lexiscan infusion.  Nuclear imaging 

scan there is a defect along the inferior wall, prior infarct could be 

considered, the favored thought is that what was found is artifact.  There were 

no stress-induced ischemic changes noted.  As a result of how she is feeling 

following the stress test we will attempt discharge tomorrow when the patient 

feels more comfortable.





7/19 - Patient seen at bedside today. States she is feeling better than 

yesterday following the stress test. She currently has no complaints. Upon 

completion of her dialysis today, she can be discharged.











Plan - Discharge Summary


Discharge Rx Participant: No


New Discharge Prescriptions: 


New


   Torsemide [Demadex] 40 mg PO DAILY 14 Days #28 tablet





Continue


   Levothyroxine Sodium [Synthroid] 112 mcg PO AC-BRKFST


   Rosuvastatin Calcium [Crestor] 10 mg PO HS


   Metoprolol Tartrate [Lopressor] 50 mg PO BID


   Calcium Acetate 1,334 mg PO TID-W/MEALS


   oxyCODONE-APAP 10-325MG [Percocet  mg] 1 tab PO TID PRN


     PRN Reason: Pain


   Albuterol Inhaler [Ventolin Hfa Inhaler] 2 puff INHALATION RT-QID PRN


     PRN Reason: Shortness Of Breath


   Albuterol Nebulized [Ventolin Nebulized] 2.5 mg INHALATION RT-QID PRN


     PRN Reason: Shortness Of Breath


   lisinopriL 40 mg PO DAILY


   Folic Acid/Vit B Complex and C [Nephro-Hyacinth Tablet] 0.8 mg PO HS


   hydrALAZINE HCL [Apresoline] 100 mg PO BID


   Lidocaine-Prilocaine Cream [Emla Cream 2.5%/2.5%] 1 applic TOPICAL AS DIRECTE

D PRN


     PRN Reason: AVF access


   Omeprazole [PriLOSEC] 20 mg PO BID


   amLODIPine [Norvasc] 10 mg PO DAILY


   Acetaminophen Tab [Tylenol] 650 mg PO Q4HR PRN  tab


     PRN Reason: Fever>101


   Ascorbic Acid [Vitamin C] 500 mg PO DAILY #30 tab


   Cyanocobalamin (Vitamin B-12) [Vitamin B-12] 1,000 mcg PO DAILY





Discontinued


   Furosemide [Lasix] 80 mg PO DAILY


Discharge Medication List





Levothyroxine Sodium [Synthroid] 112 mcg PO AC-BRKFST 02/04/20 [History]


Rosuvastatin Calcium [Crestor] 10 mg PO HS 02/04/20 [History]


Calcium Acetate 1,334 mg PO TID-W/MEALS 11/09/20 [History]


Metoprolol Tartrate [Lopressor] 50 mg PO BID 11/09/20 [History]


oxyCODONE-APAP 10-325MG [Percocet  mg] 1 tab PO TID PRN 01/07/23 [History]


Albuterol Inhaler [Ventolin Hfa Inhaler] 2 puff INHALATION RT-QID PRN 05/04/23 

[History]


Albuterol Nebulized [Ventolin Nebulized] 2.5 mg INHALATION RT-QID PRN 05/04/23 

[History]


lisinopriL 40 mg PO DAILY 12/06/23 [History]


Acetaminophen Tab [Tylenol] 650 mg PO Q4HR PRN  tab 12/11/23 [Rx]


Ascorbic Acid [Vitamin C] 500 mg PO DAILY #30 tab 12/11/23 [Rx]


Cyanocobalamin (Vitamin B-12) [Vitamin B-12] 1,000 mcg PO DAILY 07/15/24 

[History]


Folic Acid/Vit B Complex and C [Nephro-Hyacinth Tablet] 0.8 mg PO HS 07/15/24 

[History]


Lidocaine-Prilocaine Cream [Emla Cream 2.5%/2.5%] 1 applic TOPICAL AS DIRECTED 

PRN 07/15/24 [History]


Omeprazole [PriLOSEC] 20 mg PO BID 07/15/24 [History]


amLODIPine [Norvasc] 10 mg PO DAILY 07/15/24 [History]


hydrALAZINE HCL [Apresoline] 100 mg PO BID 07/15/24 [History]


Torsemide [Demadex] 40 mg PO DAILY 14 Days #28 tablet 07/19/24 [Rx]








Follow up Appointment(s)/Referral(s): 


Esau Ashraf DO [Primary Care Provider] - 1-2 days


Patient Instructions/Handouts:  Heart Failure (DC)


Discharge Disposition: HOME SELF-CARE

## 2024-11-18 ENCOUNTER — HOSPITAL ENCOUNTER (OUTPATIENT)
Dept: HOSPITAL 47 - EC | Age: 79
Setting detail: OBSERVATION
LOS: 2 days | Discharge: HOME | End: 2024-11-20
Attending: HOSPITALIST | Admitting: HOSPITALIST
Payer: MEDICARE

## 2024-11-18 VITALS — BODY MASS INDEX: 35.4 KG/M2

## 2024-11-18 DIAGNOSIS — Z87.891: ICD-10-CM

## 2024-11-18 DIAGNOSIS — N18.6: ICD-10-CM

## 2024-11-18 DIAGNOSIS — I50.32: ICD-10-CM

## 2024-11-18 DIAGNOSIS — F32.A: ICD-10-CM

## 2024-11-18 DIAGNOSIS — R19.7: ICD-10-CM

## 2024-11-18 DIAGNOSIS — I13.2: Primary | ICD-10-CM

## 2024-11-18 DIAGNOSIS — M19.91: ICD-10-CM

## 2024-11-18 DIAGNOSIS — Z88.1: ICD-10-CM

## 2024-11-18 DIAGNOSIS — E66.9: ICD-10-CM

## 2024-11-18 DIAGNOSIS — Z88.8: ICD-10-CM

## 2024-11-18 DIAGNOSIS — M89.8X9: ICD-10-CM

## 2024-11-18 DIAGNOSIS — Z79.899: ICD-10-CM

## 2024-11-18 DIAGNOSIS — D63.1: ICD-10-CM

## 2024-11-18 DIAGNOSIS — Z66: ICD-10-CM

## 2024-11-18 DIAGNOSIS — Z99.2: ICD-10-CM

## 2024-11-18 DIAGNOSIS — Z88.6: ICD-10-CM

## 2024-11-18 DIAGNOSIS — Z79.52: ICD-10-CM

## 2024-11-18 DIAGNOSIS — I49.3: ICD-10-CM

## 2024-11-18 DIAGNOSIS — F41.9: ICD-10-CM

## 2024-11-18 DIAGNOSIS — J44.9: ICD-10-CM

## 2024-11-18 DIAGNOSIS — E78.5: ICD-10-CM

## 2024-11-18 DIAGNOSIS — E11.22: ICD-10-CM

## 2024-11-18 DIAGNOSIS — I69.311: ICD-10-CM

## 2024-11-18 DIAGNOSIS — I35.0: ICD-10-CM

## 2024-11-18 DIAGNOSIS — Z82.49: ICD-10-CM

## 2024-11-18 DIAGNOSIS — Z83.3: ICD-10-CM

## 2024-11-18 LAB
ALBUMIN SERPL-MCNC: 3.7 G/DL (ref 3.5–5)
ALP SERPL-CCNC: 315 U/L (ref 38–126)
ALT SERPL-CCNC: 27 U/L (ref 4–34)
ANION GAP SERPL CALC-SCNC: 6 MMOL/L
APTT BLD: 24.1 SEC (ref 22–30)
AST SERPL-CCNC: 40 U/L (ref 14–36)
BASOPHILS # BLD AUTO: 0 K/UL (ref 0–0.2)
BASOPHILS NFR BLD AUTO: 0 %
BUN SERPL-SCNC: 24 MG/DL (ref 7–17)
CALCIUM SPEC-MCNC: 8.5 MG/DL (ref 8.4–10.2)
CHLORIDE SERPL-SCNC: 94 MMOL/L (ref 98–107)
CO2 SERPL-SCNC: 35 MMOL/L (ref 22–30)
EOSINOPHIL # BLD AUTO: 0.3 K/UL (ref 0–0.7)
EOSINOPHIL NFR BLD AUTO: 4 %
ERYTHROCYTE [DISTWIDTH] IN BLOOD BY AUTOMATED COUNT: 3.31 M/UL (ref 3.8–5.4)
ERYTHROCYTE [DISTWIDTH] IN BLOOD: 13.5 % (ref 11.5–15.5)
GLUCOSE SERPL-MCNC: 119 MG/DL (ref 74–99)
HCT VFR BLD AUTO: 33.3 % (ref 34–46)
HGB BLD-MCNC: 10.7 GM/DL (ref 11.4–16)
INR PPP: 0.9 (ref ?–1.2)
LYMPHOCYTES # SPEC AUTO: 0.9 K/UL (ref 1–4.8)
LYMPHOCYTES NFR SPEC AUTO: 13 %
MAGNESIUM SPEC-SCNC: 1.8 MG/DL (ref 1.6–2.3)
MCH RBC QN AUTO: 32.2 PG (ref 25–35)
MCHC RBC AUTO-ENTMCNC: 32 G/DL (ref 31–37)
MCV RBC AUTO: 100.7 FL (ref 80–100)
MONOCYTES # BLD AUTO: 0.4 K/UL (ref 0–1)
MONOCYTES NFR BLD AUTO: 7 %
NEUTROPHILS # BLD AUTO: 4.8 K/UL (ref 1.3–7.7)
NEUTROPHILS NFR BLD AUTO: 74 %
NT-PROBNP SERPL-MCNC: 4330 PG/ML
PLATELET # BLD AUTO: 209 K/UL (ref 150–450)
POTASSIUM SERPL-SCNC: 3.8 MMOL/L (ref 3.5–5.1)
PROT SERPL-MCNC: 7 G/DL (ref 6.3–8.2)
PT BLD: 9.7 SEC (ref 10–12.5)
SODIUM SERPL-SCNC: 135 MMOL/L (ref 137–145)
WBC # BLD AUTO: 6.6 K/UL (ref 3.8–10.6)

## 2024-11-18 PROCEDURE — 36415 COLL VENOUS BLD VENIPUNCTURE: CPT

## 2024-11-18 PROCEDURE — 85025 COMPLETE CBC W/AUTO DIFF WBC: CPT

## 2024-11-18 PROCEDURE — 83735 ASSAY OF MAGNESIUM: CPT

## 2024-11-18 PROCEDURE — 86706 HEP B SURFACE ANTIBODY: CPT

## 2024-11-18 PROCEDURE — 84100 ASSAY OF PHOSPHORUS: CPT

## 2024-11-18 PROCEDURE — 70450 CT HEAD/BRAIN W/O DYE: CPT

## 2024-11-18 PROCEDURE — 96374 THER/PROPH/DIAG INJ IV PUSH: CPT

## 2024-11-18 PROCEDURE — 99285 EMERGENCY DEPT VISIT HI MDM: CPT

## 2024-11-18 PROCEDURE — 85730 THROMBOPLASTIN TIME PARTIAL: CPT

## 2024-11-18 PROCEDURE — 85610 PROTHROMBIN TIME: CPT

## 2024-11-18 PROCEDURE — 71046 X-RAY EXAM CHEST 2 VIEWS: CPT

## 2024-11-18 PROCEDURE — 94640 AIRWAY INHALATION TREATMENT: CPT

## 2024-11-18 PROCEDURE — 51798 US URINE CAPACITY MEASURE: CPT

## 2024-11-18 PROCEDURE — 90935 HEMODIALYSIS ONE EVALUATION: CPT

## 2024-11-18 PROCEDURE — 87340 HEPATITIS B SURFACE AG IA: CPT

## 2024-11-18 PROCEDURE — 80053 COMPREHEN METABOLIC PANEL: CPT

## 2024-11-18 PROCEDURE — 93005 ELECTROCARDIOGRAM TRACING: CPT

## 2024-11-18 PROCEDURE — 83880 ASSAY OF NATRIURETIC PEPTIDE: CPT

## 2024-11-19 LAB
ALBUMIN SERPL-MCNC: 3.3 G/DL (ref 3.8–4.9)
ALBUMIN/GLOB SERPL: 1.22 RATIO (ref 1.6–3.17)
ALP SERPL-CCNC: 279 U/L (ref 41–126)
ALT SERPL-CCNC: 21 U/L (ref 8–44)
ANION GAP SERPL CALC-SCNC: 14 MMOL/L (ref 4–12)
AST SERPL-CCNC: 28 U/L (ref 13–35)
BASOPHILS # BLD AUTO: 0.03 X 10*3/UL (ref 0–0.1)
BASOPHILS NFR BLD AUTO: 0.4 %
BUN SERPL-SCNC: 32.9 MG/DL (ref 9–27)
BUN/CREAT SERPL: 8.44 RATIO (ref 12–20)
CALCIUM SPEC-MCNC: 8.4 MG/DL (ref 8.7–10.3)
CHLORIDE SERPL-SCNC: 96 MMOL/L (ref 96–109)
CO2 SERPL-SCNC: 28 MMOL/L (ref 21.6–31.8)
EOSINOPHIL # BLD AUTO: 0.27 X 10*3/UL (ref 0.04–0.35)
EOSINOPHIL NFR BLD AUTO: 3.7 %
ERYTHROCYTE [DISTWIDTH] IN BLOOD BY AUTOMATED COUNT: 3.17 X 10*6/UL (ref 4.1–5.2)
ERYTHROCYTE [DISTWIDTH] IN BLOOD: 13.7 % (ref 11.5–14.5)
GLOBULIN SER CALC-MCNC: 2.7 G/DL (ref 1.6–3.3)
GLUCOSE SERPL-MCNC: 99 MG/DL (ref 70–110)
HCT VFR BLD AUTO: 32.8 % (ref 37.2–46.3)
HGB BLD-MCNC: 10.2 G/DL (ref 12–15)
IMM GRANULOCYTES BLD QL AUTO: 0.4 %
LYMPHOCYTES # SPEC AUTO: 1.23 X 10*3/UL (ref 0.9–5)
LYMPHOCYTES NFR SPEC AUTO: 17.1 %
MCH RBC QN AUTO: 32.2 PG (ref 27–32)
MCHC RBC AUTO-ENTMCNC: 31.1 G/DL (ref 32–37)
MCV RBC AUTO: 103.5 FL (ref 80–97)
MONOCYTES # BLD AUTO: 0.84 X 10*3/UL (ref 0.2–1)
MONOCYTES NFR BLD AUTO: 11.7 %
NEUTROPHILS # BLD AUTO: 4.81 X 10*3/UL (ref 1.8–7.7)
NEUTROPHILS NFR BLD AUTO: 66.7 %
NRBC BLD AUTO-RTO: 0 X 10*3/UL (ref 0–0.01)
PLATELET # BLD AUTO: 207 X 10*3/UL (ref 140–440)
POTASSIUM SERPL-SCNC: 4.2 MMOL/L (ref 3.5–5.5)
PROT SERPL-MCNC: 6 G/DL (ref 6.2–8.2)
SODIUM SERPL-SCNC: 138 MMOL/L (ref 135–145)
WBC # BLD AUTO: 7.21 X 10*3/UL (ref 4.5–10)

## 2024-11-19 RX ADMIN — METOPROLOL SUCCINATE SCH MG: 25 TABLET, EXTENDED RELEASE ORAL at 09:22

## 2024-11-19 RX ADMIN — OXYCODONE HYDROCHLORIDE AND ACETAMINOPHEN STA EACH: 10; 325 TABLET ORAL at 00:42

## 2024-11-19 RX ADMIN — Medication SCH EACH: at 21:00

## 2024-11-19 RX ADMIN — ISODIUM CHLORIDE PRN MG: 0.03 SOLUTION RESPIRATORY (INHALATION) at 12:29

## 2024-11-19 RX ADMIN — FUROSEMIDE SCH MG: 80 TABLET ORAL at 09:22

## 2024-11-19 RX ADMIN — OXYCODONE HYDROCHLORIDE AND ACETAMINOPHEN PRN EACH: 10; 325 TABLET ORAL at 09:22

## 2024-11-19 RX ADMIN — ONDANSETRON PRN MG: 2 INJECTION INTRAMUSCULAR; INTRAVENOUS at 18:17

## 2024-11-19 RX ADMIN — Medication SCH MG: at 11:37

## 2024-11-19 RX ADMIN — CYANOCOBALAMIN TAB 500 MCG SCH MCG: 500 TAB at 11:38

## 2024-11-19 RX ADMIN — OXYCODONE HYDROCHLORIDE AND ACETAMINOPHEN SCH MG: 500 TABLET ORAL at 11:38

## 2024-11-20 VITALS — RESPIRATION RATE: 18 BRPM

## 2024-11-20 VITALS — DIASTOLIC BLOOD PRESSURE: 53 MMHG | SYSTOLIC BLOOD PRESSURE: 115 MMHG | HEART RATE: 70 BPM

## 2024-11-20 VITALS — TEMPERATURE: 97.5 F

## 2024-11-20 LAB — HBV SURFACE AB SERPL IA-ACNC: 3.5 MIU/ML

## 2025-03-24 ENCOUNTER — HOSPITAL ENCOUNTER (OUTPATIENT)
Dept: HOSPITAL 47 - EC | Age: 80
Setting detail: OBSERVATION
LOS: 4 days | Discharge: HOME | End: 2025-03-28
Attending: HOSPITALIST | Admitting: HOSPITALIST
Payer: MEDICARE

## 2025-03-24 DIAGNOSIS — I50.33: ICD-10-CM

## 2025-03-24 DIAGNOSIS — Z96.653: ICD-10-CM

## 2025-03-24 DIAGNOSIS — Z90.710: ICD-10-CM

## 2025-03-24 DIAGNOSIS — I25.10: ICD-10-CM

## 2025-03-24 DIAGNOSIS — I25.2: ICD-10-CM

## 2025-03-24 DIAGNOSIS — Z79.899: ICD-10-CM

## 2025-03-24 DIAGNOSIS — Z87.891: ICD-10-CM

## 2025-03-24 DIAGNOSIS — N25.0: ICD-10-CM

## 2025-03-24 DIAGNOSIS — J20.5: Primary | ICD-10-CM

## 2025-03-24 DIAGNOSIS — Z86.73: ICD-10-CM

## 2025-03-24 DIAGNOSIS — I35.0: ICD-10-CM

## 2025-03-24 DIAGNOSIS — J44.0: ICD-10-CM

## 2025-03-24 DIAGNOSIS — E66.9: ICD-10-CM

## 2025-03-24 DIAGNOSIS — I13.2: ICD-10-CM

## 2025-03-24 DIAGNOSIS — Z99.2: ICD-10-CM

## 2025-03-24 DIAGNOSIS — Z98.84: ICD-10-CM

## 2025-03-24 DIAGNOSIS — E78.5: ICD-10-CM

## 2025-03-24 DIAGNOSIS — E87.1: ICD-10-CM

## 2025-03-24 DIAGNOSIS — D63.1: ICD-10-CM

## 2025-03-24 DIAGNOSIS — E03.9: ICD-10-CM

## 2025-03-24 DIAGNOSIS — E11.22: ICD-10-CM

## 2025-03-24 DIAGNOSIS — Z85.42: ICD-10-CM

## 2025-03-24 DIAGNOSIS — M89.8X9: ICD-10-CM

## 2025-03-24 DIAGNOSIS — N18.6: ICD-10-CM

## 2025-03-24 LAB
ALBUMIN SERPL-MCNC: 4.1 G/DL (ref 3.5–5)
ALP SERPL-CCNC: 246 U/L (ref 38–126)
ALT SERPL-CCNC: 31 U/L (ref 4–34)
ANION GAP SERPL CALC-SCNC: 8 MMOL/L
APTT BLD: 19.5 SEC (ref 22–30)
AST SERPL-CCNC: 53 U/L (ref 14–36)
BASOPHILS # BLD AUTO: 0 K/UL (ref 0–0.2)
BASOPHILS NFR BLD AUTO: 0 %
BUN SERPL-SCNC: 40 MG/DL (ref 7–17)
CALCIUM SPEC-MCNC: 8.8 MG/DL (ref 8.4–10.2)
CHLORIDE SERPL-SCNC: 93 MMOL/L (ref 98–107)
CO2 SERPL-SCNC: 36 MMOL/L (ref 22–30)
EOSINOPHIL # BLD AUTO: 0.1 K/UL (ref 0–0.7)
EOSINOPHIL NFR BLD AUTO: 2 %
ERYTHROCYTE [DISTWIDTH] IN BLOOD BY AUTOMATED COUNT: 3.53 M/UL (ref 3.8–5.4)
ERYTHROCYTE [DISTWIDTH] IN BLOOD: 14.3 % (ref 11.5–15.5)
GLUCOSE SERPL-MCNC: 82 MG/DL (ref 74–99)
HCT VFR BLD AUTO: 35.8 % (ref 34–46)
HGB BLD-MCNC: 11.2 GM/DL (ref 11.4–16)
INR PPP: 0.8 (ref ?–1.2)
LYMPHOCYTES # SPEC AUTO: 1.1 K/UL (ref 1–4.8)
LYMPHOCYTES NFR SPEC AUTO: 12 %
MAGNESIUM SPEC-SCNC: 1.9 MG/DL (ref 1.6–2.3)
MCH RBC QN AUTO: 31.8 PG (ref 25–35)
MCHC RBC AUTO-ENTMCNC: 31.3 G/DL (ref 31–37)
MCV RBC AUTO: 101.6 FL (ref 80–100)
MONOCYTES # BLD AUTO: 0.4 K/UL (ref 0–1)
MONOCYTES NFR BLD AUTO: 5 %
NEUTROPHILS # BLD AUTO: 7.5 K/UL (ref 1.3–7.7)
NEUTROPHILS NFR BLD AUTO: 81 %
NT-PROBNP SERPL-MCNC: 7630 PG/ML
PLATELET # BLD AUTO: 268 K/UL (ref 150–450)
POTASSIUM SERPL-SCNC: 4.4 MMOL/L (ref 3.5–5.1)
PROT SERPL-MCNC: 7.5 G/DL (ref 6.3–8.2)
PT BLD: 9.7 SEC (ref 10–12.5)
SODIUM SERPL-SCNC: 137 MMOL/L (ref 137–145)
WBC # BLD AUTO: 9.3 K/UL (ref 3.8–10.6)

## 2025-03-24 PROCEDURE — 80053 COMPREHEN METABOLIC PANEL: CPT

## 2025-03-24 PROCEDURE — 84100 ASSAY OF PHOSPHORUS: CPT

## 2025-03-24 PROCEDURE — 90935 HEMODIALYSIS ONE EVALUATION: CPT

## 2025-03-24 PROCEDURE — 85027 COMPLETE CBC AUTOMATED: CPT

## 2025-03-24 PROCEDURE — 87186 SC STD MICRODIL/AGAR DIL: CPT

## 2025-03-24 PROCEDURE — 94760 N-INVAS EAR/PLS OXIMETRY 1: CPT

## 2025-03-24 PROCEDURE — 96376 TX/PRO/DX INJ SAME DRUG ADON: CPT

## 2025-03-24 PROCEDURE — 36415 COLL VENOUS BLD VENIPUNCTURE: CPT

## 2025-03-24 PROCEDURE — 83605 ASSAY OF LACTIC ACID: CPT

## 2025-03-24 PROCEDURE — 85610 PROTHROMBIN TIME: CPT

## 2025-03-24 PROCEDURE — 87086 URINE CULTURE/COLONY COUNT: CPT

## 2025-03-24 PROCEDURE — 99285 EMERGENCY DEPT VISIT HI MDM: CPT

## 2025-03-24 PROCEDURE — 94640 AIRWAY INHALATION TREATMENT: CPT

## 2025-03-24 PROCEDURE — 83735 ASSAY OF MAGNESIUM: CPT

## 2025-03-24 PROCEDURE — 85730 THROMBOPLASTIN TIME PARTIAL: CPT

## 2025-03-24 PROCEDURE — 93306 TTE W/DOPPLER COMPLETE: CPT

## 2025-03-24 PROCEDURE — 85025 COMPLETE CBC W/AUTO DIFF WBC: CPT

## 2025-03-24 PROCEDURE — 83880 ASSAY OF NATRIURETIC PEPTIDE: CPT

## 2025-03-24 PROCEDURE — 96374 THER/PROPH/DIAG INJ IV PUSH: CPT

## 2025-03-24 PROCEDURE — 87077 CULTURE AEROBIC IDENTIFY: CPT

## 2025-03-24 PROCEDURE — 84439 ASSAY OF FREE THYROXINE: CPT

## 2025-03-24 PROCEDURE — 80061 LIPID PANEL: CPT

## 2025-03-24 PROCEDURE — 80048 BASIC METABOLIC PNL TOTAL CA: CPT

## 2025-03-24 PROCEDURE — 71046 X-RAY EXAM CHEST 2 VIEWS: CPT

## 2025-03-24 PROCEDURE — 93005 ELECTROCARDIOGRAM TRACING: CPT

## 2025-03-24 PROCEDURE — 87636 SARSCOV2 & INF A&B AMP PRB: CPT

## 2025-03-24 PROCEDURE — 84443 ASSAY THYROID STIM HORMONE: CPT

## 2025-03-24 PROCEDURE — 96375 TX/PRO/DX INJ NEW DRUG ADDON: CPT

## 2025-03-24 RX ADMIN — FUROSEMIDE STA MG: 10 INJECTION, SOLUTION INTRAMUSCULAR; INTRAVENOUS at 22:52

## 2025-03-24 RX ADMIN — IPRATROPIUM BROMIDE AND ALBUTEROL SULFATE STA ML: .5; 3 SOLUTION RESPIRATORY (INHALATION) at 23:02

## 2025-03-24 RX ADMIN — DEXTROSE STA MG: 50 INJECTION, SOLUTION INTRAVENOUS at 22:52

## 2025-03-24 NOTE — XR
EXAMINATION TYPE: XR chest 2V

 

DATE OF EXAM: 3/24/2025 5:01 PM

 

COMPARISON: None

 

CLINICAL INDICATION: Female, 79 years old with history of difficulty breathing; [facility]

 

TECHNIQUE: XR chest 2V Frontal and lateral views of the chest.

 

FINDINGS: 

Lungs/Pleura: There is no evidence of pleural effusion, focal consolidation, or pneumothorax.  

Pulmonary vascularity: Pulmonary vascular congestion.

Heart/mediastinum: Cardiomediastinal silhouette is enlarged.

Musculoskeletal: No acute osseous pathology.

 

IMPRESSION: 

Cardiomegaly and mild pulmonary vascular congestion. Correlate with BNP for congestive heart failure.


 

 

 

[facility], Workstation: [id number], 3/24/2025 5:25 PM

## 2025-03-24 NOTE — ED
Weakness HPI





- General


Source: patient, RN notes reviewed


Mode of arrival: ambulatory


Limitations: no limitations





- History of Present Illness


MD Complaint: generalized weakness





<Kizzy Ernst - Last Filed: 03/24/25 16:42>





- General


Source: patient, RN notes reviewed, old records reviewed


Mode of arrival: ambulatory


Limitations: no limitations





- History of Present Illness


MD Complaint: generalized weakness


-: days(s)


Location: generalized


Severity: severe


Severity scale (1-10): 8


Consistency: constant


Improves with: none


Worsens with: none


Context: recent illness


Associated Symptoms: chest pain, shortness of breath





<Presley Brady - Last Filed: 03/31/25 16:04>





- General


Chief complaint: Weakness


Stated complaint: Weakness,Nausea


Time Seen by Provider: 03/24/25 16:40





- History of Present Illness


Initial comments: 


Quick Note: This is a 79 year old female who presents to the emergency 

department for coughing and shortness of breath over the last few days. States 

that she continues to feel weaker. She is a hemodialysis patient M,W,F. Denies 

any chest pain.


 (Kizzy Ernst)


This is a 79-year-old female to ER for severe weakness shortness of breath exert

ional dyspnea significant cough congestion wheezing, patient is on hemodialysis 

(Presley Brady)





- Related Data


                                Home Medications











 Medication  Instructions  Recorded  Confirmed


 


Calcium Acetate 1,334 mg PO TID-W/MEALS 11/09/20 03/25/25


 


oxyCODONE-APAP 10-325MG [Percocet 1 tab PO TID PRN 01/07/23 03/25/25





 mg]   


 


Albuterol Inhaler [Ventolin Hfa 2 puff INHALATION RT-QID PRN 05/04/23 03/25/25





Inhaler]   


 


Folic Acid/Vit B Complex and C 0.8 mg PO HS 07/15/24 03/25/25





[Nephro-Hyacinth Tablet]   


 


Lidocaine-Prilocaine Cream [Emla 1 applic TOPICAL MOWEFR PRN 07/15/24 03/25/25





Cream 2.5%/2.5%]   


 


Furosemide [Lasix] 80 mg PO DAILY 11/18/24 03/25/25


 


Fluticasone Propionate [Flonase 2 spray EA NOSTRIL DAILY PRN 03/25/25 03/25/25





Allergy Relief]   


 


Ipratropium-Albuterol Nebulize 3 ml INHALATION RT-TID PRN 03/25/25 03/25/25





[Duoneb 0.5 mg-3 mg/3 ml Soln]   


 


Sulfamethox-Tmp 800-160Mg [Bactrim 0.5 tab PO BID 03/25/25 03/25/25





-160 mg]   


 


Vitamin B Complex 1 cap PO HS 03/25/25 03/25/25








                                  Previous Rx's











 Medication  Instructions  Recorded


 


Acetaminophen Tab [Tylenol] 650 mg PO Q4HR PRN  tab 12/11/23


 


Metoprolol Succinate (ER) [Toprol 12.5 mg PO HS #30 tab 11/20/24





XL]  


 


Ciprofloxacin HCl [Cipro] 500 mg PO DAILY 4 Days #4 tab 03/28/25


 


predniSONE 30 mg PO DAILY 5 Days #15 tab 03/28/25











                                    Allergies











Allergy/AdvReac Type Severity Reaction Status Date / Time


 


cefuroxime Allergy  Rash/Hives Verified 03/25/25 08:30


 


naproxen [From Naprosyn] Allergy  Dyspnea Verified 03/25/25 08:30


 


blood thinner Allergy  "brain Uncoded 03/24/25 16:40





   bleed"  














Review of Systems


ROS Other: All systems not noted in ROS Statement are negative.





<Kizzy Ernst - Last Filed: 03/24/25 16:42>


ROS Other: All systems not noted in ROS Statement are negative.





<Presley Brady - Last Filed: 03/31/25 16:04>


ROS Statement: 


Those systems with pertinent positive or pertinent negative responses have been 

documented in the HPI.








Past Medical History


Past Medical History: Asthma, Cancer, COPD, Dialysis, Hyperlipidemia, 

Hypertension, Myocardial Infarction (MI), Osteoarthritis (OA), Pneumonia, Renal 

Disease, Thyroid Disorder


Additional Past Medical History / Comment(s): Hx Uterine cancer 25 yrs ago, 

hemodialysis Mon, Wed and Fri, varicose veins., "mild heart attack"" "brain 

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 

anemia, current edema rufina feet


Last Myocardial Infarction Date:: unknown


History of Any Multi-Drug Resistant Organisms: None Reported, ESBL


Date of last positivie culture/infection: 3/19/25


MDRO Source:: urine


Past Surgical History: Appendectomy, Bariatric Surgery, Cholecystectomy, 

Hysterectomy, Joint Replacement, Tonsillectomy


Additional Past Surgical History / Comment(s): gastric bypass, surgery after 

fall -fx rt leg and foot, rufina knee replacement, rufina cataracts, surgery x 2 or 

"brain bleed", fistula for dialysis, COLONOSCOPY


Past Anesthesia/Blood Transfusion Reactions: No Reported Reaction, Family 

History of Problems w/ Anesthesia


Additional Past Anesthesia/Blood Transfusion Reaction / Comment(s): MOTHER PONV


Past Psychological History: No Psychological Hx Reported


Smoking Status: Former smoker


Past Alcohol Use History: None Reported


Past Drug Use History: None Reported





- Past Family History


  ** Mother


Family Medical History: Cancer





  ** Father


Family Medical History: Diabetes Mellitus, Hypertension





  ** Sister(s)


Family Medical History: Cancer





<Kizzy Ernst - Last Filed: 03/24/25 16:42>





General Exam


Limitations: no limitations





<Kizzy Ernst - Last Filed: 03/24/25 16:42>


General appearance: alert, in no apparent distress


Head exam: Present: atraumatic, normocephalic, normal inspection


Eye exam: Present: normal appearance, PERRL, EOMI.  Absent: scleral icterus, 

conjunctival injection, periorbital swelling


ENT exam: Present: normal exam, mucous membranes moist


Neck exam: Present: normal inspection.  Absent: tenderness, meningismus, 

lymphadenopathy


Respiratory exam: Present: respiratory distress, wheezes.  Absent: rales, 

rhonchi, stridor


Cardiovascular Exam: Present: normal rhythm, tachycardia, normal heart sounds.  

Absent: systolic murmur, diastolic murmur, rubs, gallop, clicks


GI/Abdominal exam: Present: soft, normal bowel sounds.  Absent: distended, 

tenderness, guarding, rebound, rigid


Extremities exam: Present: normal inspection, full ROM, normal capillary refill.

 Absent: tenderness, pedal edema, joint swelling, calf tenderness


Back exam: Present: normal inspection


Neurological exam: Present: alert, oriented X3, CN II-XII intact


Psychiatric exam: Present: normal affect, normal mood


Skin exam: Present: warm, dry, intact, normal color.  Absent: rash





<Presley Brady - Last Filed: 03/31/25 16:04>





- General Exam Comments


Initial Comments: 


Visual Physical Exam





Vital signs reviewed





General: Well-appearing, nontoxic, no acute distress.


Head: Normocephalic, atraumatic


Eyes: PERRLA, EOMI


ENT: Airway patent


Chest: Nonlabored breathing


Skin: No visual rash, normal skin tone


Neuro: Alert and oriented 3


Musculoskeletal: No gross abnormalities


 (Kizzy Ernst)





Course





<Presley Brady JESSICA - Last Filed: 03/31/25 16:04>


                                   Vital Signs











  03/24/25 03/24/25 03/24/25





  16:36 21:21 23:02


 


Temperature 99.2 F  


 


Pulse Rate 103 H 77 81


 


Pulse Rate [   





Pulse Oximetery   





]   


 


Respiratory 20 18 20





Rate   


 


Blood Pressure  124/67 99/60


 


Blood Pressure   





[Left Arm]   


 


O2 Sat by Pulse 95 96 99





Oximetry   














  03/24/25 03/24/25 03/24/25





  23:03 23:13 23:43


 


Temperature   


 


Pulse Rate 73 95 78


 


Pulse Rate [   





Pulse Oximetery   





]   


 


Respiratory   18





Rate   


 


Blood Pressure   132/49


 


Blood Pressure   





[Left Arm]   


 


O2 Sat by Pulse   100





Oximetry   














  03/25/25 03/25/25 03/25/25





  01:46 02:25 02:35


 


Temperature   


 


Pulse Rate  58 L 74


 


Pulse Rate [ 68  





Pulse Oximetery   





]   


 


Respiratory 17 20 





Rate   


 


Blood Pressure  138/93 


 


Blood Pressure   





[Left Arm]   


 


O2 Sat by Pulse  97 





Oximetry   














  03/25/25 03/25/25 03/25/25





  02:44 05:00 06:09


 


Temperature   


 


Pulse Rate 70 55 L 64


 


Pulse Rate [   





Pulse Oximetery   





]   


 


Respiratory  17 18





Rate   


 


Blood Pressure  106/51 129/56


 


Blood Pressure   





[Left Arm]   


 


O2 Sat by Pulse   95





Oximetry   














  03/25/25 03/25/25 03/25/25





  07:39 07:57 08:04


 


Temperature   


 


Pulse Rate 78 69 78


 


Pulse Rate [   





Pulse Oximetery   





]   


 


Respiratory 18 16 18





Rate   


 


Blood Pressure 139/61  


 


Blood Pressure   





[Left Arm]   


 


O2 Sat by Pulse 95  





Oximetry   














  03/25/25 03/25/25 03/25/25





  12:15 12:40 12:47


 


Temperature   


 


Pulse Rate 70 79 80


 


Pulse Rate [   





Pulse Oximetery   





]   


 


Respiratory 18 18 18





Rate   


 


Blood Pressure 164/89  


 


Blood Pressure   





[Left Arm]   


 


O2 Sat by Pulse 95  





Oximetry   














  03/25/25 03/25/25





  16:23 16:46


 


Temperature 97.7 F 98.4 F


 


Pulse Rate 71 


 


Pulse Rate [  





Pulse Oximetery  





]  


 


Respiratory 20 17





Rate  


 


Blood Pressure 140/65 


 


Blood Pressure  160/71





[Left Arm]  


 


O2 Sat by Pulse 95 96





Oximetry  














- Reevaluation(s)


Reevaluation #1: 





03/24/25 22:10


Medical records reviewed (Presley Brady)


Reevaluation #2: 





03/24/25 22:10


Patient states she does not feel well still very short of breath 

(Presley Brady)


Reevaluation #3: 





03/24/25 22:10


Patient informed of results questions answered (Presley Brady)


Reevaluation #4: 





Was pt. sent in by a medical professional or institution (, PA, NP, urgent 

care, hospital, or nursing home...) When possible be specific


@  -no


Did you speak to anyone other than the patient for history (EMS, parent, family,

police, friend...)? What history was obtained from this source 


@  -no


Did you review nursing and triage notes (agree or disagree)?  Why? 


@  -agree


Are old charts reviewed (outside hosp., previous admission, EMS record, old EKG,

old radiological studies, urgent care reports/EKG's, nursing home records)? 

Report findings 


@  -yes


Differential Diagnosis (chest pain, altered mental status, abdominal pain women,

abdominal pain men, vaginal bleeding, weakness, fever, dyspnea, syncope, head

ache, dizziness, GI bleed, back pain, seizure, CVA, palpatations, mental health,

musculoskeletal)? 


@  -prior


EKG interpreted by me (3pts min.).


@  -yes


X-rays interpreted by me (1pt min.).


@  -yes RSV bronchiolitis changes CHF


CT interpreted by me (1pt min.).


@  -no


U/S interpreted by me (1pt. min.).


@  -no


What testing was considered but not performed or refused? (CT, X-rays, U/S, 

labs)? Why?


@  -none


What meds were considered but not given or refused? Why?


@  -none


Did you discuss the management of the patient with other professionals 

(professionals i.e. , PA, NP, lab, RT, psych nurse, , , 

teacher, , )? Give summary


@  -no


Was smoking cessation discussed for >3mins.?


@  -no


Was critical care preformed (if so, how long)?


@  -no


Were there social determinants of health that impacted care today? How? 

(Homelessness, low income, unemployed, alcoholism, drug addiction, 

transportation, low edu. Level, literacy, decrease access to med. care, senior living, 

rehab)?


@  -none


Was there de-escalation of care discussed even if they declined (Discuss DNR or 

withdrawal of care, Hospice)? DNR status


@  -no


What co-morbidities impacted this encounter? (DM, HTN, Smoking, COPD, CAD, 

Cancer, CVA, ARF, Chemo, Hep., AIDS, mental health diagnosis, sleep apnea, 

morbid obesity)?


@  -none


Was patient admitted / discharged? Hospital course, mention meds given and 

route, prescriptions, significant lab abnormalities, going to OR and other 

pertinent info.


@  - 79 female will be admitted patient will be admitted for RSV CHF and 

weakness, history of ROMERO on dialysis


Admitted 


Undiagnosed new problem with uncertain prognosis?


@  -no


Drug Therapy requiring intensive monitoring for toxicity (Heparin, Nitro, 

Insulin, Cardizem)?


@  -no


Were any procedures done?


@  -no


Diagnosis/symptom?


@  -RSV complicated with CHF and shortness of breath and acute kidney injury


Acute, or Chronic, or Acute on Chronic?


@  -Acute


Uncomplicated (without systemic symptoms) or Complicated (systemic symptoms)?


@  -Complicated


Side effects of treatment?


@  -no


Exacerbation, Progression, or Severe Exacerbation?


@  -exacerbation


Poses a threat to life or bodily function? How? (Chest pain, USA, MI, pneumonia,

PE, COPD, DKA, ARF, appy, cholecystitis, CVA, Diverticulitis, Homicidal, S

uicidal, threat to staff... and all critical care pts)


@  -yes extremes of age (Presley Brady)


Reevaluation #5: 





Differential Dyspnea:


Coronary syndrome, arrhythmia, tamponade, asthma, COPD, pulmonary embolism, 

pneumonia, pneumothorax, pulmonary effusion, anaphylaxis, diabetic ketoacidosis,

flailed chest, pulmonary contusion, diaphragmatic rupture, anemia, 

neuromuscular, this is not meant to be an all-inclusive list. 


 (Presley Brady)





- Consultations


Consultation #1: 





Spoke with [facility] who agrees to admit (Presley Brady)





EKG Findings





- EKG Comments:


EKG Findings:: EKG is sinus 93  QRS 94 QTc 375





- EKG Results:


EKG: interpreted by ERMD





<Presley Brady - Last Filed: 03/31/25 16:04>





Medical Decision Making





<Kizzy Ernst - Last Filed: 03/24/25 16:42>





- Lab Data


Result diagrams: 


                                 03/28/25 06:26





                                 03/28/25 06:26





- EKG Data


-: EKG Interpreted by Me





- Radiology Data


Radiology results: report reviewed (Chest x-ray positive for CHF and RSV 

changes), image reviewed





<Presley Brady - Last Filed: 03/31/25 16:04>





- Medical Decision Making


I performed the QuickNote portion of this chart. Signed Kizzy Ernst PA-C.


 (Kizzy Ernst)


79 female will be admitted patient will be admitted for RSV CHF and weakness, 

history of ROMERO on dialysis (Presley Brady)





- Lab Data


                                   Lab Results











  03/24/25 03/24/25 03/24/25 Range/Units





  19:30 19:30 19:30 


 


WBC  9.3    (3.8-10.6)  k/uL


 


RBC  3.53 L    (3.80-5.40)  m/uL


 


Hgb  11.2 L    (11.4-16.0)  gm/dL


 


Hct  35.8    (34.0-46.0)  %


 


MCV  101.6 H    (80.0-100.0)  fL


 


MCH  31.8    (25.0-35.0)  pg


 


MCHC  31.3    (31.0-37.0)  g/dL


 


RDW  14.3    (11.5-15.5)  %


 


Plt Count  268    (150-450)  k/uL


 


MPV  7.6    


 


Neutrophils %  81    %


 


Lymphocytes %  12    %


 


Monocytes %  5    %


 


Eosinophils %  2    %


 


Basophils %  0    %


 


Neutrophils #  7.5    (1.3-7.7)  k/uL


 


Lymphocytes #  1.1    (1.0-4.8)  k/uL


 


Monocytes #  0.4    (0-1.0)  k/uL


 


Eosinophils #  0.1    (0-0.7)  k/uL


 


Basophils #  0.0    (0-0.2)  k/uL


 


Macrocytosis  Slight    


 


PT   9.7 L   (10.0-12.5)  sec


 


INR   0.8   (<1.2)  


 


APTT   19.5 L   (22.0-30.0)  sec


 


Sodium    137  (137-145)  mmol/L


 


Potassium    4.4  (3.5-5.1)  mmol/L


 


Chloride    93 L  ()  mmol/L


 


Carbon Dioxide    36 H  (22-30)  mmol/L


 


Anion Gap    8  mmol/L


 


BUN    40 H  (7-17)  mg/dL


 


Creatinine    2.96 H  (0.52-1.04)  mg/dL


 


Est GFR (CKD-EPI)AfAm    17  (>60 ml/min/1.73 sqM)  


 


Est GFR (CKD-EPI)NonAf    15  (>60 ml/min/1.73 sqM)  


 


Glucose    82  (74-99)  mg/dL


 


Plasma Lactic Acid Tomy     (0.7-2.0)  mmol/L


 


Calcium    8.8  (8.4-10.2)  mg/dL


 


Magnesium    1.9  (1.6-2.3)  mg/dL


 


Total Bilirubin    1.0  (0.2-1.3)  mg/dL


 


AST    53 H  (14-36)  U/L


 


ALT    31  (4-34)  U/L


 


Alkaline Phosphatase    246 H  ()  U/L


 


NT-Pro-B Natriuret Pep    7630  pg/mL


 


Total Protein    7.5  (6.3-8.2)  g/dL


 


Albumin    4.1  (3.5-5.0)  g/dL


 


Influenza Type A (PCR)     (Not Detectd)  


 


Influenza Type B (PCR)     (Not Detectd)  


 


RSV (PCR)     (Not Detectd)  


 


SARS-CoV-2 (PCR)     (Not Detectd)  














  03/24/25 03/24/25 Range/Units





  19:30 19:30 


 


WBC    (3.8-10.6)  k/uL


 


RBC    (3.80-5.40)  m/uL


 


Hgb    (11.4-16.0)  gm/dL


 


Hct    (34.0-46.0)  %


 


MCV    (80.0-100.0)  fL


 


MCH    (25.0-35.0)  pg


 


MCHC    (31.0-37.0)  g/dL


 


RDW    (11.5-15.5)  %


 


Plt Count    (150-450)  k/uL


 


MPV    


 


Neutrophils %    %


 


Lymphocytes %    %


 


Monocytes %    %


 


Eosinophils %    %


 


Basophils %    %


 


Neutrophils #    (1.3-7.7)  k/uL


 


Lymphocytes #    (1.0-4.8)  k/uL


 


Monocytes #    (0-1.0)  k/uL


 


Eosinophils #    (0-0.7)  k/uL


 


Basophils #    (0-0.2)  k/uL


 


Macrocytosis    


 


PT    (10.0-12.5)  sec


 


INR    (<1.2)  


 


APTT    (22.0-30.0)  sec


 


Sodium    (137-145)  mmol/L


 


Potassium    (3.5-5.1)  mmol/L


 


Chloride    ()  mmol/L


 


Carbon Dioxide    (22-30)  mmol/L


 


Anion Gap    mmol/L


 


BUN    (7-17)  mg/dL


 


Creatinine    (0.52-1.04)  mg/dL


 


Est GFR (CKD-EPI)AfAm    (>60 ml/min/1.73 sqM)  


 


Est GFR (CKD-EPI)NonAf    (>60 ml/min/1.73 sqM)  


 


Glucose    (74-99)  mg/dL


 


Plasma Lactic Acid Tomy  1.7   (0.7-2.0)  mmol/L


 


Calcium    (8.4-10.2)  mg/dL


 


Magnesium    (1.6-2.3)  mg/dL


 


Total Bilirubin    (0.2-1.3)  mg/dL


 


AST    (14-36)  U/L


 


ALT    (4-34)  U/L


 


Alkaline Phosphatase    ()  U/L


 


NT-Pro-B Natriuret Pep    pg/mL


 


Total Protein    (6.3-8.2)  g/dL


 


Albumin    (3.5-5.0)  g/dL


 


Influenza Type A (PCR)   Not Detected  (Not Detectd)  


 


Influenza Type B (PCR)   Not Detected  (Not Detectd)  


 


RSV (PCR)   Detected A  (Not Detectd)  


 


SARS-CoV-2 (PCR)   Not Detected  (Not Detectd)  














Disposition





<Kizzy Ernst - Last Filed: 03/24/25 16:42>


Is patient prescribed a controlled substance at d/c from ED?: No


Time of Disposition: 22:00





<Presley Brady - Last Filed: 03/31/25 16:04>


Clinical Impression: 


 Weakness, Congestive heart failure, RSV bronchiolitis





Disposition: ADMITTED AS IP TO THIS HOSP


Condition: Fair

## 2025-03-25 LAB
ALBUMIN SERPL-MCNC: 3.7 G/DL (ref 3.5–5)
ALP SERPL-CCNC: 254 U/L (ref 38–126)
ALT SERPL-CCNC: 33 U/L (ref 4–34)
ANION GAP SERPL CALC-SCNC: 8 MMOL/L
AST SERPL-CCNC: 48 U/L (ref 14–36)
BASOPHILS # BLD AUTO: 0 K/UL (ref 0–0.2)
BASOPHILS NFR BLD AUTO: 0 %
BUN SERPL-SCNC: 47 MG/DL (ref 7–17)
CALCIUM SPEC-MCNC: 8.7 MG/DL (ref 8.4–10.2)
CHLORIDE SERPL-SCNC: 92 MMOL/L (ref 98–107)
CO2 SERPL-SCNC: 33 MMOL/L (ref 22–30)
EOSINOPHIL # BLD AUTO: 0 K/UL (ref 0–0.7)
EOSINOPHIL NFR BLD AUTO: 0 %
ERYTHROCYTE [DISTWIDTH] IN BLOOD BY AUTOMATED COUNT: 3.19 M/UL (ref 3.8–5.4)
ERYTHROCYTE [DISTWIDTH] IN BLOOD: 13.8 % (ref 11.5–15.5)
GLUCOSE SERPL-MCNC: 202 MG/DL (ref 74–99)
HCT VFR BLD AUTO: 32.8 % (ref 34–46)
HGB BLD-MCNC: 10.4 GM/DL (ref 11.4–16)
LYMPHOCYTES # SPEC AUTO: 0.4 K/UL (ref 1–4.8)
LYMPHOCYTES NFR SPEC AUTO: 5 %
MAGNESIUM SPEC-SCNC: 2 MG/DL (ref 1.6–2.3)
MCH RBC QN AUTO: 32.6 PG (ref 25–35)
MCHC RBC AUTO-ENTMCNC: 31.7 G/DL (ref 31–37)
MCV RBC AUTO: 102.9 FL (ref 80–100)
MONOCYTES # BLD AUTO: 0.1 K/UL (ref 0–1)
MONOCYTES NFR BLD AUTO: 2 %
NEUTROPHILS # BLD AUTO: 6.4 K/UL (ref 1.3–7.7)
NEUTROPHILS NFR BLD AUTO: 92 %
PLATELET # BLD AUTO: 218 K/UL (ref 150–450)
POTASSIUM SERPL-SCNC: 5 MMOL/L (ref 3.5–5.1)
PROT SERPL-MCNC: 6.6 G/DL (ref 6.3–8.2)
SODIUM SERPL-SCNC: 133 MMOL/L (ref 137–145)
WBC # BLD AUTO: 6.9 K/UL (ref 3.8–10.6)

## 2025-03-25 RX ADMIN — Medication SCH EACH: at 21:10

## 2025-03-25 RX ADMIN — IPRATROPIUM BROMIDE AND ALBUTEROL SULFATE SCH: .5; 3 SOLUTION RESPIRATORY (INHALATION) at 16:45

## 2025-03-25 RX ADMIN — IPRATROPIUM BROMIDE AND ALBUTEROL SULFATE PRN ML: .5; 3 SOLUTION RESPIRATORY (INHALATION) at 02:35

## 2025-03-25 RX ADMIN — MORPHINE SULFATE PRN MG: 4 INJECTION, SOLUTION INTRAMUSCULAR; INTRAVENOUS at 07:35

## 2025-03-25 RX ADMIN — METOPROLOL SUCCINATE SCH MG: 25 TABLET, EXTENDED RELEASE ORAL at 21:11

## 2025-03-25 NOTE — P.CNPUL
History of Present Illness


Consult date: 03/25/25


Requesting physician: Esau Ashraf


Reason for consult: dyspnea, cough, abnormal CXR/CT


Chief complaint: Cough, shortness of breath.


History of present illness: 





Pulmonary consult dated March 25, 2025.





79-year-old female seen in the emergency department, room 11.  For the last week

or so, the patient has been complaining of cough, and shortness of breath.  She 

came into the emergency room to be evaluated.  The patient tested positive for 

RSV.  She has an extensive past medical history.  She sees Dr. Ashraf, and also

follows with cardiology, nephrology, and urology.  The patient is not a smoker. 

Interestingly, she does have a nebulizer machine at home but apparently has 

never been diagnosed with a lung condition.  She uses albuterol in the nebulizer

machine at home.  The patient has a history of end-stage renal disease, usually 

requiring hemodialysis on Monday Wednesday Friday, diabetes mellitus, 

hyperlipidemia, hypertension, myocardial infarction, hypothyroidism, obesity, 

and previous cerebral hemorrhage.  The patient has a right upper extremity 

fistula.  The patient is resting comfortably in the emergency department.  She 

is not receiving any IV fluids.  She is on room air.  Her chest x-ray shows an 

elevated right diaphragm, and some changes of mild CHF.  Her BNP was elevated at

7630.  White count 6.9, hemoglobin 10.4, hematocrit 32.8, and platelet count 

normal.  Sodium 133, potassium 5, chloride 92, CO2 33, anion gap 8, BUN 47, 

creatinine 3.93.  Glucose is 202.  AST 48.  Alkaline phosphatase 254.  N-

terminal proBNP is 7630.  She did test positive for RSV.  Chest x-ray has been 

evaluated.





Review of Systems





REVIEW OF SYSTEMS:  


CONSTITUTIONAL:  [Negative.]


NEUROLOGIC: [ Negative.]


HEENT:  [ Negative.]


CARDIAC:  [Negative.]


PULMONARY: Cough and shortness of breath.


GI:  [Negative.]


:  [Negative.]


RHEUMATOLOGIC: [ Negative.]


IMMUNOLOGIC: [ Negative.]


ENDOCRINE:  [Negative.  ] 


DERMATOLOGIC: [Negative.]








Past Medical History


Past Medical History: Asthma, Cancer, COPD, Dialysis, Hyperlipidemia, 

Hypertension, Myocardial Infarction (MI), Osteoarthritis (OA), Pneumonia, Renal 

Disease, Thyroid Disorder


Additional Past Medical History / Comment(s): Hx Uterine cancer 25 yrs ago, 

hemodialysis Mon, Wed and Fri, varicose veins., "mild heart attack"" "brain 

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 

anemia, current edema rufina feet


Last Myocardial Infarction Date:: unknown


History of Any Multi-Drug Resistant Organisms: None Reported, ESBL


Date of last positivie culture/infection: 3/19/25


MDRO Source:: urine


Past Surgical History: Appendectomy, Bariatric Surgery, Cholecystectomy, 

Hysterectomy, Joint Replacement, Tonsillectomy


Additional Past Surgical History / Comment(s): gastric bypass, surgery after 

fall -fx rt leg and foot, rufina knee replacement, rufina cataracts, surgery x 2 or 

"brain bleed", fistula for dialysis, COLONOSCOPY


Past Anesthesia/Blood Transfusion Reactions: No Reported Reaction, Family 

History of Problems w/ Anesthesia


Additional Past Anesthesia/Blood Transfusion Reaction / Comment(s): MOTHER PONV


Past Psychological History: No Psychological Hx Reported


Smoking Status: Former smoker


Past Alcohol Use History: None Reported


Past Drug Use History: None Reported





- Past Family History


  ** Mother


Family Medical History: Cancer





  ** Father


Family Medical History: Diabetes Mellitus, Hypertension





  ** Sister(s)


Family Medical History: Cancer





Medications and Allergies


                                Home Medications











 Medication  Instructions  Recorded  Confirmed  Type


 


Calcium Acetate 1,334 mg PO TID-W/MEALS 11/09/20 03/25/25 History


 


oxyCODONE-APAP 10-325MG [Percocet 1 tab PO TID PRN 01/07/23 03/25/25 History





 mg]    


 


Albuterol Inhaler [Ventolin Hfa 2 puff INHALATION RT-QID PRN 05/04/23 03/25/25 

History





Inhaler]    


 


Acetaminophen Tab [Tylenol] 650 mg PO Q4HR PRN  tab 12/11/23 03/25/25 Rx


 


Folic Acid/Vit B Complex and C 0.8 mg PO HS 07/15/24 03/25/25 History





[Nephro-Hyacinth Tablet]    


 


Lidocaine-Prilocaine Cream [Emla 1 applic TOPICAL MOWEFR PRN 07/15/24 03/25/25 

History





Cream 2.5%/2.5%]    


 


Furosemide [Lasix] 80 mg PO DAILY 11/18/24 03/25/25 History


 


Metoprolol Succinate (ER) [Toprol 12.5 mg PO HS #30 tab 11/20/24 03/25/25 Rx





XL]    


 


Fluticasone Propionate [Flonase 2 spray EA NOSTRIL DAILY PRN 03/25/25 03/25/25 

History





Allergy Relief]    


 


Ipratropium-Albuterol Nebulize 3 ml INHALATION RT-TID PRN 03/25/25 03/25/25 

History





[Duoneb 0.5 mg-3 mg/3 ml Soln]    


 


Sulfamethox-Tmp 800-160Mg [Bactrim 0.5 tab PO BID 03/25/25 03/25/25 History





-160 mg]    


 


Vitamin B Complex 1 cap PO HS 03/25/25 03/25/25 History








                                    Allergies











Allergy/AdvReac Type Severity Reaction Status Date / Time


 


cefuroxime Allergy  Rash/Hives Verified 03/25/25 08:30


 


naproxen [From Naprosyn] Allergy  Dyspnea Verified 03/25/25 08:30


 


blood thinner Allergy  "brain Uncoded 03/24/25 16:40





   bleed"  














Physical Exam


Osteopathic Statement: *.  No significant issues noted on an osteopathic 

structural exam other than those noted in the History and Physical/Consult.


Vitals: 


                                   Vital Signs











  Temp Pulse Resp BP Pulse Ox


 


 03/25/25 12:47   80  18  


 


 03/25/25 12:40   79  18  


 


 03/25/25 12:15   70  18  164/89  95


 


 03/25/25 08:04   78  18  


 


 03/25/25 07:57   69  16  


 


 03/25/25 07:39   78  18  139/61  95


 


 03/25/25 06:09   64  18  129/56  95


 


 03/25/25 05:00   55 L  17  106/51 


 


 03/25/25 02:44   70   


 


 03/25/25 02:35   74   


 


 03/25/25 02:25   58 L  20  138/93  97


 


 03/24/25 23:43   78  18  132/49  100


 


 03/24/25 23:13   95   


 


 03/24/25 23:03   73   


 


 03/24/25 23:02   81  20  99/60  99


 


 03/24/25 21:21   77  18  124/67  96


 


 03/24/25 16:36  99.2 F  103 H  20   95








                                Intake and Output











 03/24/25 03/25/25 03/25/25





 22:59 06:59 14:59


 


Other:   


 


  Weight 102.058 kg  














No acute distress, oriented 3.  No respiratory distress.  Her cough is wet and 

congested.  She is currently on room air.





HEENT examination is grossly unremarkable.  Mucous membranes are moist.  No oral

lesions.





Neck supple.  Full range of motion.  No adenopathy thyromegaly or neck vein 

distention.





Cardiovascular examination reveals regular rhythm rate.  S1-S2 normal.  No S3 or

S4.  No discernible murmur noted.  Heart sounds are distant.





Lungs reveal scattered bilateral rhonchi.  No wheezes.  No crackles.  Breath 

sounds are equal.





Abdomen soft bowel sounds are heard.  No masses or tenderness.





Extremities are intact.  No cyanosis clubbing or edema.  Patient has an AV 

fistula in the right upper extremity.





Skin is without rash or lesion.





Neurologic examination is brief but nonfocal.





Results





- Laboratory Findings


CBC and BMP: 


                                 03/25/25 06:26





                                 03/25/25 06:26


PT/INR, D-dimer











PT  9.7 sec (10.0-12.5)  L  03/24/25  19:30    


 


INR  0.8  (<1.2)   03/24/25  19:30    








Abnormal lab findings: 


                                  Abnormal Labs











  03/24/25 03/24/25 03/24/25





  19:30 19:30 19:30


 


RBC  3.53 L  


 


Hgb  11.2 L  


 


Hct   


 


MCV  101.6 H  


 


Lymphocytes #   


 


PT   9.7 L 


 


APTT   19.5 L 


 


Sodium   


 


Chloride    93 L


 


Carbon Dioxide    36 H


 


BUN    40 H


 


Creatinine    2.96 H


 


Glucose   


 


AST    53 H


 


Alkaline Phosphatase    246 H


 


RSV (PCR)   














  03/24/25 03/25/25 03/25/25





  19:30 06:26 06:26


 


RBC   3.19 L 


 


Hgb   10.4 L 


 


Hct   32.8 L 


 


MCV   102.9 H 


 


Lymphocytes #   0.4 L 


 


PT   


 


APTT   


 


Sodium    133 L


 


Chloride    92 L


 


Carbon Dioxide    33 H


 


BUN    47 H


 


Creatinine    3.93 H


 


Glucose    202 H


 


AST    48 H


 


Alkaline Phosphatase    254 H


 


RSV (PCR)  Detected A  














- Diagnostic Findings


Chest x-ray: image reviewed





Assessment and Plan


Assessment: 





Acute viral bronchitis, secondary to RSV infection.





History of end-stage renal disease, currently on Monday, Wednesday, Friday 

hemodialysis.





History of hypertension.





History of myocardial infarction.





History of hyperlipidemia.





History of diabetes mellitus.





History of hypothyroidism.





Prior history of cerebral hemorrhage.





Obesity.


Plan: 





Plan dated March 25, 2025.





The patient is seen today in the emergency department, room 11.  Not sure why 

this patient is being admitted.  She has a nebulizer machine with albuterol at 

home.  She could certainly use that.  She could be discharged with some 

corticosteroid, to be taken at home.  She is currently on room air, and no 

respiratory distress.  She is not receiving any IV fluids.  Labs, x-rays, and 

medications are all reviewed.  Prognosis is thought to be generally good.  She 

has no prior history of any lung disease.  Not quite sure why she has a nebuliz

er machine at home, as it was given to her when she was seeing doctors in 

[place].  Dictation was produced using Dragon dictation software.  Please 

excuse any grammatical, word or spelling errors.








Time with Patient: Greater than 30

## 2025-03-25 NOTE — CA
Transthoracic Echo Report 

 Name: Brenda Lynn 

 MRN:    [id number] 

 Age:    79     Gender:     F 

 

 :    1945 

 Exam Date:     2025 13:31 

 Exam Location: [facility] 

 Ht (in):     63     Wt (lb):     225 

 Ordering Physician:        Marquita Almonte MD 

 Attending/Referring Phys:         [id number], Suzy 

 Technician         Lul Mock, CHENTE 

 Procedure CPT: 

 Indications:       chf 

 

 Cardiac Hx:        MI, COPD, HTN, Cancer 

 Technical Quality:      Good 

 Contrast 1:                                Total Dose (mL): 

 Contrast 2:                                Total Dose (mL): 

 

 MEASUREMENTS  (Male / Female) Normal Values 

 2D ECHO 

 LV Diastolic Diameter PLAX        5.6 cm                4.2 - 5.9 / 3.9 - 5.3 cm 

 LV Systolic Diameter PLAX         3.5 cm                 

 IVS Diastolic Thickness           1.1 cm                0.6 - 1.0 / 0.6 - 0.9 cm 

 LVPW Diastolic Thickness          1.1 cm                0.6 - 1.0 / 0.6 - 0.9 cm 

 LV Relative Wall Thickness        0.4                    

 RV Internal Dim ED PLAX           3.3 cm                 

 LVOT Diameter                     1.5 cm                 

 LA Systolic Diameter LX           3.7 cm                3.0 - 4.0 / 2.7 - 3.8 cm 

 LV Diastolic Volume MOD 4C        127.5 cm???              

 LV Systolic Volume MOD 4C         63.5 cm???               

 LV Ejection Fraction MOD 4C       50.3 %                 

 LV Diastolic Length 4C            7.9 cm                 

 LV Systolic Length 4C             6.6 cm                 

 LA Volume                         89.9 cm???              18 - 58 / 22 - 52 cm??? 

 LA Volume Index                   41.2 cm???/m???           16 - 28 cm???/m??? 

 

 DOPPLER 

 AV Peak Velocity                  303.6 cm/s             

 AV Peak Gradient                  36.9 mmHg              

 AV Mean Velocity                  196.2 cm/s             

 AV Mean Gradient                  17.0 mmHg              

 AV Velocity Time Integral         61.7 cm                

 MV Peak Velocity                  164.0 cm/s             

 MV Peak Gradient                  10.8 mmHg              

 MV Mean Velocity                  88.7 cm/s              

 MV Mean Gradient                  4.0 mmHg               

 MV Velocity Time Integral         48.6 cm                

 MV Area PHT                       2.5 cm???                

 Mitral E Point Velocity           120.9 cm/s             

 Mitral A Point Velocity           128.9 cm/s             

 Mitral E to A Ratio               0.9                    

 MV Deceleration Time              266.7 ms               

 TR Peak Velocity                  310.4 cm/s             

 TR Peak Gradient                  38.5 mmHg              

 

 

 FINDINGS 

 Left Ventricle 

 Left ventricular ejection fraction is estimated at 50-55%. Mildly increased  

 septal wall thickness. Mildly increased posterior wall thickness. Mildly  

 increased left ventricular diastolic diameter. No obvious regional wall motion  

 abnormalities. 

 

 Right Ventricle 

 Mild right ventricular dilatation. Unable to estimate the right ventricular  

 systolic pressure. 

 

 Right Atrium 

 Normal right atrial size. 

 

 Left Atrium 

 Severely increased left atrial volume. Mildly increased left atrial area. 

 

 Mitral Valve 

 Mitral valve thickened. Mild mitral stenosis. Trace mitral regurgitation. 

 

 Aortic Valve 

 Trileaflet aortic valve. Diffuse thickening of the aortic valve cusps with  

 reduced excursion. Mild to moderate aortic stenosis with a peak gradient of 30  

 mmHg and a mean gradient of 17 mmHg.  No aortic regurgitation. 

 

 Tricuspid Valve 

 Structurally normal tricuspid valve. No tricuspid stenosis. Mild tricuspid  

 regurgitation. 

 

 Pulmonic Valve 

 Structurally normal pulmonic valve. No pulmonic stenosis. No pulmonic  

 regurgitation. 

 

 Pericardium 

 No pericardial effusion. No pleural effusion. 

 

 Aorta 

 Normal size aortic root and proximal ascending aorta. 

 

 CONCLUSIONS 

 Normal LV systolic function 

 Mild to moderate aortic stenosis 

 Previewed by:  

 Dr. John Hanna MD 

 (Electronically Signed) 

 Final Date:      2025 17:11

## 2025-03-26 RX ADMIN — ONDANSETRON PRN MG: 2 INJECTION INTRAMUSCULAR; INTRAVENOUS at 08:01

## 2025-03-26 RX ADMIN — Medication SCH MG: at 06:52

## 2025-03-26 RX ADMIN — OXYCODONE HYDROCHLORIDE AND ACETAMINOPHEN PRN EACH: 10; 325 TABLET ORAL at 08:01

## 2025-03-26 RX ADMIN — FUROSEMIDE SCH MG: 80 TABLET ORAL at 08:01

## 2025-03-26 NOTE — P.PN
Subjective


Progress Note Date: 03/26/25





79-year-old female patient with history of hypertension, hyperlipidemia, 

asthma/COPD, hypothyroidism, ESRD/HD, presents to ED for the last week or so, 

the patient has been complaining of cough, and shortness of breath.  She came 

into the emergency room to be evaluated.  The patient tested positive for RSV.  

She does have a nebulizer machine at home but apparently has never been 

diagnosed with a lung condition.  She uses albuterol in the nebulizer machine at

home.  The patient has a history of end-stage renal disease, usually requiring 

hemodialysis on Monday Wednesday Friday, diabetes mellitus, hyperlipidemia, 

hypertension, myocardial infarction, hypothyroidism, obesity, and previous 

cerebral hemorrhage.  The patient has a right upper extremity fistula.  


--chest x-ray shows an elevated right diaphragm, and some changes of mild CHF.  


-BNP was elevated at 7630.  White count 6.9, hemoglobin 10.4, hematocrit 32.8, 

and platelet count normal.  Sodium 133, potassium 5, chloride 92, CO2 33, anion 

gap 8, BUN 47, creatinine 3.93.  Glucose is 202.  AST 48.  Alkaline phosphatase 

254.  N-terminal proBNP is 7630.  She did test positive for RSV.





Objective





- Vital Signs


Vital signs: 


                                   Vital Signs











Temp  98.3 F   03/26/25 07:55


 


Pulse  76   03/26/25 12:28


 


Resp  16   03/26/25 07:55


 


BP  190/77   03/26/25 07:55


 


Pulse Ox  96   03/26/25 09:03


 


FiO2  21   03/26/25 09:03








                                 Intake & Output











 03/25/25 03/26/25 03/26/25





 18:59 06:59 18:59


 


Intake Total 600  200


 


Balance 600  200


 


Weight 102.058 kg  


 


Intake:   


 


  Oral 600  200


 


Other:   


 


  # Voids 1 1 














- Exam





Head exam: Present: atraumatic, normocephalic, normal inspection


Eye exam: Present: normal appearance, PERRL, EOMI.  Absent: scleral icterus, 

conjunctival injection, periorbital swelling


ENT exam: Present: normal exam, mucous membranes moist


Neck exam: Present: normal inspection.  Absent: tenderness, meningismus, 

lymphadenopathy


Respiratory exam: Present: respiratory distress, wheezes.  Absent: rales, 

rhonchi, stridor


Cardiovascular Exam: Present: normal rhythm, tachycardia, normal heart sounds.  

Absent: systolic murmur, diastolic murmur, rubs, gallop, clicks


GI/Abdominal exam: Present: soft, normal bowel sounds.  Absent: distended, 

tenderness, guarding, rebound, rigid


Extremities exam: Present: normal inspection, full ROM, normal capillary refill.

 Absent: tenderness, pedal edema, joint swelling, calf tenderness


Back exam: Present: normal inspection


Neurological exam: Present: alert, oriented X3, CN II-XII intact


Psychiatric exam: Present: normal affect, normal mood


Skin exam: Present: warm, dry, intact, normal color.  Absent: rash





- Labs


CBC & Chem 7: 


                                 03/25/25 06:26





                                 03/25/25 06:26





Assessment and Plan


Assessment: 





1.  Severe acute bronchitis/RSV infection


-Patient has been placed on IV steroid; bronchodilator nebulizer treatments 4 

times daily and as needed


-O2 per nasal cannula keeping O2 saturation greater than 92%


Consult pulmonary





2.  Mild hyponatremia; likely fluid overload given history of end-stage renal 

disease; we will trend electrolytes





3.  End-stage renal disease/HD; patient undergoes dialysis every Monday, 

Wednesday and Friday; nephrology has been consulted; patient remains on Lasix 80

mg daily





4.  Hypertension; metoprolol 12.5 mg daily





5.  Hyperlipidemia; currently not on any statin therapy





6.  Hypothyroidism per history; patient is not on thyroid replacement; will 

order TSH and free T4





7.  CAD/history of MI





DVT prophylaxis; SCDs/subcu heparin


CODE STATUS; full code

## 2025-03-26 NOTE — P.NPCON
History of Present Illness





- Reason for Consult


end stage renal disease





- History of Present Illness





Reason for consultation: End-stage renal disease





History of present illness:


Patient is a 79-year-old female seen in renal consultation for end-stage renal 

disease.  She is maintained on hemodialysis on Monday Wednesday Friday schedule.

 Last dialysis was Monday.  Patient states since the weekend she has been having

a productive cough with yellow phlegm.  She denies fever.  Does feel weak.  

Patient has chronic loose bowel movements.  Denies vomiting.  She did test 

positive for RSV.  She is currently on room air.  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 

urine.  She denies history of diabetes or coronary artery disease.  Denies chest

pain.  Scheduled for dialysis today.





Vital signs are stable.


General: No acute distress.


HEENT: Head exam is unremarkable. 


LUNGS: No audible rhonchi or wheezes.


HEART: Rate and Rhythm are regular.


ABDOMEN: Obese, nontender.


EXTREMITITES: Trace edema.  





Past Medical History


Past Medical History: Asthma, Cancer, COPD, Dialysis, Hyperlipidemia, 

Hypertension, Myocardial Infarction (MI), Osteoarthritis (OA), Pneumonia, Renal 

Disease, Thyroid Disorder


Additional Past Medical History / Comment(s): Hx Uterine cancer 25 yrs ago, 

hemodialysis Mon, Wed and Fri, varicose veins., "mild heart attack"" "brain 

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 

anemia, current edema rufina feet


Last Myocardial Infarction Date:: unknown


History of Any Multi-Drug Resistant Organisms: None Reported, ESBL


Date of last positivie culture/infection: 3/19/25


MDRO Source:: urine


Past Surgical History: Appendectomy, Bariatric Surgery, Cholecystectomy, 

Hysterectomy, Joint Replacement, Tonsillectomy


Additional Past Surgical History / Comment(s): gastric bypass, surgery after 

fall -fx rt leg and foot, rufina knee replacement, rufina cataracts, surgery x 2 or 

"brain bleed", fistula for dialysis, COLONOSCOPY


Past Anesthesia/Blood Transfusion Reactions: No Reported Reaction, Family 

History of Problems w/ Anesthesia


Additional Past Anesthesia/Blood Transfusion Reaction / Comment(s): MOTHER PONV


Past Psychological History: No Psychological Hx Reported


Smoking Status: Former smoker


Past Alcohol Use History: None Reported


Additional Past Alcohol Use History / Comment(s): Quit smoking 30+ yrs ago.


Past Drug Use History: None Reported





- Past Family History


  ** Mother


Family Medical History: Cancer





  ** Father


Family Medical History: Diabetes Mellitus, Hypertension





  ** Sister(s)


Family Medical History: Cancer





Medications and Allergies


                                Home Medications











 Medication  Instructions  Recorded  Confirmed  Type


 


Calcium Acetate 1,334 mg PO TID-W/MEALS 11/09/20 03/25/25 History


 


oxyCODONE-APAP 10-325MG [Percocet 1 tab PO TID PRN 01/07/23 03/25/25 History





 mg]    


 


Albuterol Inhaler [Ventolin Hfa 2 puff INHALATION RT-QID PRN 05/04/23 03/25/25 

History





Inhaler]    


 


Acetaminophen Tab [Tylenol] 650 mg PO Q4HR PRN  tab 12/11/23 03/25/25 Rx


 


Folic Acid/Vit B Complex and C 0.8 mg PO HS 07/15/24 03/25/25 History





[Nephro-Hyacitnh Tablet]    


 


Lidocaine-Prilocaine Cream [Emla 1 applic TOPICAL MOWEFR PRN 07/15/24 03/25/25 

History





Cream 2.5%/2.5%]    


 


Furosemide [Lasix] 80 mg PO DAILY 11/18/24 03/25/25 History


 


Metoprolol Succinate (ER) [Toprol 12.5 mg PO HS #30 tab 11/20/24 03/25/25 Rx





XL]    


 


Fluticasone Propionate [Flonase 2 spray EA NOSTRIL DAILY PRN 03/25/25 03/25/25 

History





Allergy Relief]    


 


Ipratropium-Albuterol Nebulize 3 ml INHALATION RT-TID PRN 03/25/25 03/25/25 

History





[Duoneb 0.5 mg-3 mg/3 ml Soln]    


 


Sulfamethox-Tmp 800-160Mg [Bactrim 0.5 tab PO BID 03/25/25 03/25/25 History





-160 mg]    


 


Vitamin B Complex 1 cap PO HS 03/25/25 03/25/25 History








                                    Allergies











Allergy/AdvReac Type Severity Reaction Status Date / Time


 


cefuroxime Allergy  Rash/Hives Verified 03/25/25 08:30


 


naproxen [From Naprosyn] Allergy  Dyspnea Verified 03/25/25 08:30


 


blood thinner Allergy  "brain Uncoded 03/24/25 16:40





   bleed"  














Physical Exam


Vitals: 


                                   Vital Signs











  Temp Pulse Pulse Resp BP BP Pulse Ox


 


 03/26/25 09:10   76     


 


 03/26/25 09:03        96


 


 03/26/25 09:00   72     


 


 03/26/25 07:55  98.3 F   112 H  16   190/77  96


 


 03/26/25 01:52  97.4 F L   65  18   138/69  94 L


 


 03/25/25 21:09   76     


 


 03/25/25 21:00   72     


 


 03/25/25 19:04  97.4 F L   68  17   130/61  94 L


 


 03/25/25 16:46  98.4 F    17   160/71  96


 


 03/25/25 16:23  97.7 F  71   20  140/65   95


 


 03/25/25 12:47   80   18   


 


 03/25/25 12:40   79   18   


 


 03/25/25 12:15   70   18  164/89   95














  FiO2


 


 03/26/25 09:10 


 


 03/26/25 09:03  21


 


 03/26/25 09:00 


 


 03/26/25 07:55 


 


 03/26/25 01:52 


 


 03/25/25 21:09 


 


 03/25/25 21:00 


 


 03/25/25 19:04 


 


 03/25/25 16:46 


 


 03/25/25 16:23 


 


 03/25/25 12:47 


 


 03/25/25 12:40 


 


 03/25/25 12:15 








                                Intake and Output











 03/25/25 03/26/25 03/26/25





 22:59 06:59 14:59


 


Intake Total 600  


 


Balance 600  


 


Intake:   


 


  Oral 600  


 


Other:   


 


  # Voids 1 1 


 


  Weight 102.058 kg  














Results





- Lab Results


                             Most recent lab results











Calcium  8.7 mg/dL (8.4-10.2)   03/25/25  06:26    


 


Phosphorus  4.5 mg/dL (2.5-4.5)   03/25/25  06:26    


 


Magnesium  2.0 mg/dL (1.6-2.3)   03/25/25  06:26    














                                 03/25/25 06:26





                                 03/25/25 06:26





Assessment and Plan


Plan: 





Assessment:


1.  End-stage renal disease maintained on hemodialysis on Monday Wednesday Friday schedule.


2.  Acute bronchitis secondary to RSV infection.


3.  Acute on chronic diastolic CHF and mild to moderate aortic stenosis.


4.  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 

on PhosLo.


5.  Volume overload.


6.  History of cerebral hemorrhage.





Plan:


Hemodialysis today.


Maintain Lasix.





Thank you for the consultation.  I will continue to follow the patient with you 

during her hospital stay.

## 2025-03-26 NOTE — P.CRDCN
History of Present Illness


History of present illness: 





HISTORY OF PRESENT ILLNESS:  





This is a 79-year-old female with a past medical history significant for end-

stage renal disease on hemodialysis, hypertension, hyperlipidemia, diabetes, 

obesity, and mild aortic stenosis. Patient follows in the office with Dr. Hdz. We have been asked to see the patient in consultation for congestive 

heart failure. Patient examined at the bedside.  Patient presented to the 

hospital with a chief complaint of shortness of breath.  Patient was found to be

positive for RSV.  Patient states she is on hemodialysis Monday Wednesday and 

Friday.  She denies missing any treatments of her dialysis.  She is maintained 

on oral diuretics.  Vital signs are stable.





DIAGNOSTICS:


- EKG reveals sinus mechanism with no signs of acute ischemia.


- Chest xray cardiomegaly and mild pulmonary vascular congestion


- Laboratory data: WBC 6.9.  Hemoglobin 10.4.  Platelet count 218.  Sodium 133. 

Potassium 5.0.  BUN 47.  Creatinine 3.93.  proBNP 7630.


- Current home cardiac medications include metoprolol succinate 12.5 mg at night

and Lasix 80 mg daily.


- Echocardiogram obtained this admission reveals ejection fraction 50 to 55%, no

obvious regional wall motion abnormalities, mild to moderate aortic stenosis


-Patient underwent Lexiscan stress test in July 2024 which was negative for 

stress-induced ischemic changes





REVIEW OF SYSTEMS: 


At the time of my exam:


CONSTITUTIONAL: Denies fever or chills.


HEENT: Denies blurred vision, vision changes, or eye pain. Denies hemoptysis 


CARDIOVASCULAR: Denies chest pain. Denies orthopnea. Denies PND. Denies 

palpitations


RESPIRATORY: Denies shortness of breath. 


GASTROINTESTINAL: Denies abdominal pain. Denies nausea or vomiting. 


HEMATOLOGIC: Denies bleeding disorders.


GENITOURINARY:  Denies any blood in urine.


SKIN: Denies pruitis. Denies rash.





PHYSICAL EXAM: 


VITAL SIGNS: Reviewed.


GENERAL: Well-developed in no acute distress. 


HEENT: Head is normocephalic. Pupils are equal, round. Sclerae anicteric. Mucous

membranes of the mouth are moist. Neck supple. No JVD or thyromegaly


LUNGS: Respirations even and unlabored. Lungs diminished bilaterally


HEART: Regular rate and rhythm.  S1 and S2 heard.


ABDOMEN: Soft. Nondistended. Nontender.


EXTREMITIES: Normal range of motion.  No clubbing or cyanosis.  Peripheral 

pulses intact.  No lower extremity edema


NEUROLOGIC: Awake and alert. Oriented x 3. 





ASSESSMENT: 


Shortness of breath


Acute RSV


End-stage renal disease on hemodialysis


Acute on chronic heart failure with preserved EF


Mild to moderate aortic stenosis


Hypertension


Hyperlipidemia


Diabetes


Obesity: BMI 39.9





PLAN: 


2D echo obtained and reviewed


Continue current cardiac medications


Continue oral diuretics


Continue hemodialysis per nephrology


Patient is currently stable from a cardiac standpoint


Further recommendations pending patient course








Nurse practitioner note has been reviewed by physician. Signing provider agrees 

with the documented findings, assessment, and plan of care documented by NP as a

scribe.








Past Medical History


Past Medical History: Asthma, Cancer, COPD, Dialysis, Hyperlipidemia, 

Hypertension, Myocardial Infarction (MI), Osteoarthritis (OA), Pneumonia, Renal 

Disease, Thyroid Disorder


Additional Past Medical History / Comment(s): Hx Uterine cancer 25 yrs ago, 

hemodialysis Mon, Wed and Fri, varicose veins., "mild heart attack"" "brain 

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 

anemia, current edema rufina feet


Last Myocardial Infarction Date:: unknown


History of Any Multi-Drug Resistant Organisms: None Reported, ESBL


Date of last positivie culture/infection: 3/19/25


MDRO Source:: urine


Past Surgical History: Appendectomy, Bariatric Surgery, Cholecystectomy, 

Hysterectomy, Joint Replacement, Tonsillectomy


Additional Past Surgical History / Comment(s): gastric bypass, surgery after 

fall -fx rt leg and foot, rufina knee replacement, rufina cataracts, surgery x 2 or 

"brain bleed", fistula for dialysis, COLONOSCOPY


Past Anesthesia/Blood Transfusion Reactions: No Reported Reaction, Family 

History of Problems w/ Anesthesia


Additional Past Anesthesia/Blood Transfusion Reaction / Comment(s): MOTHER PONV


Past Psychological History: No Psychological Hx Reported


Smoking Status: Former smoker


Past Alcohol Use History: None Reported


Additional Past Alcohol Use History / Comment(s): Quit smoking 30+ yrs ago.


Past Drug Use History: None Reported





- Past Family History


  ** Mother


Family Medical History: Cancer





  ** Father


Family Medical History: Diabetes Mellitus, Hypertension





  ** Sister(s)


Family Medical History: Cancer





Medications and Allergies


                                Home Medications











 Medication  Instructions  Recorded  Confirmed  Type


 


Calcium Acetate 1,334 mg PO TID-W/MEALS 11/09/20 03/25/25 History


 


oxyCODONE-APAP 10-325MG [Percocet 1 tab PO TID PRN 01/07/23 03/25/25 History





 mg]    


 


Albuterol Inhaler [Ventolin Hfa 2 puff INHALATION RT-QID PRN 05/04/23 03/25/25 

History





Inhaler]    


 


Acetaminophen Tab [Tylenol] 650 mg PO Q4HR PRN  tab 12/11/23 03/25/25 Rx


 


Folic Acid/Vit B Complex and C 0.8 mg PO HS 07/15/24 03/25/25 History





[Nephro-Hyacinth Tablet]    


 


Lidocaine-Prilocaine Cream [Emla 1 applic TOPICAL MOWEFR PRN 07/15/24 03/25/25 

History





Cream 2.5%/2.5%]    


 


Furosemide [Lasix] 80 mg PO DAILY 11/18/24 03/25/25 History


 


Metoprolol Succinate (ER) [Toprol 12.5 mg PO HS #30 tab 11/20/24 03/25/25 Rx





XL]    


 


Fluticasone Propionate [Flonase 2 spray EA NOSTRIL DAILY PRN 03/25/25 03/25/25 

History





Allergy Relief]    


 


Ipratropium-Albuterol Nebulize 3 ml INHALATION RT-TID PRN 03/25/25 03/25/25 

History





[Duoneb 0.5 mg-3 mg/3 ml Soln]    


 


Sulfamethox-Tmp 800-160Mg [Bactrim 0.5 tab PO BID 03/25/25 03/25/25 History





-160 mg]    


 


Vitamin B Complex 1 cap PO HS 03/25/25 03/25/25 History








                                    Allergies











Allergy/AdvReac Type Severity Reaction Status Date / Time


 


cefuroxime Allergy  Rash/Hives Verified 03/25/25 08:30


 


naproxen [From Naprosyn] Allergy  Dyspnea Verified 03/25/25 08:30


 


blood thinner Allergy  "brain Uncoded 03/24/25 16:40





   bleed"  














Physical Exam


Vitals: 


                                   Vital Signs











  Temp Pulse Pulse Resp BP BP Pulse Ox


 


 03/26/25 09:10   76     


 


 03/26/25 09:03        96


 


 03/26/25 09:00   72     


 


 03/26/25 07:55  98.3 F   112 H  16   190/77  96


 


 03/26/25 01:52  97.4 F L   65  18   138/69  94 L


 


 03/25/25 21:09   76     


 


 03/25/25 21:00   72     


 


 03/25/25 19:04  97.4 F L   68  17   130/61  94 L


 


 03/25/25 16:46  98.4 F    17   160/71  96


 


 03/25/25 16:23  97.7 F  71   20  140/65   95


 


 03/25/25 12:47   80   18   


 


 03/25/25 12:40   79   18   


 


 03/25/25 12:15   70   18  164/89   95














  FiO2


 


 03/26/25 09:10 


 


 03/26/25 09:03  21


 


 03/26/25 09:00 


 


 03/26/25 07:55 


 


 03/26/25 01:52 


 


 03/25/25 21:09 


 


 03/25/25 21:00 


 


 03/25/25 19:04 


 


 03/25/25 16:46 


 


 03/25/25 16:23 


 


 03/25/25 12:47 


 


 03/25/25 12:40 


 


 03/25/25 12:15 








                                Intake and Output











 03/25/25 03/26/25 03/26/25





 22:59 06:59 14:59


 


Intake Total 600  200


 


Balance 600  200


 


Intake:   


 


  Oral 600  200


 


Other:   


 


  # Voids 1 1 


 


  Weight 102.058 kg  














Results





                                 03/25/25 06:26





                                 03/25/25 06:26


                               Current Medications











Generic Name Dose Route Start Last Admin





  Trade Name Freq  PRN Reason Stop Dose Admin


 


Acetaminophen  650 mg  03/25/25 19:52 





  Acetaminophen Tab 325 Mg Tab  PO  





  Q4HR PRN  





  Fever>101  


 


Albuterol Sulfate  2.5 mg  03/25/25 19:52 





  Albuterol Nebulized 2.5 Mg/3 Ml  INHALATION  





  RT-QID PRN  





  Shortness Of Breath  


 


Albuterol/Ipratropium  3 ml  03/25/25 16:00  03/26/25 09:00





  Ipratropium-Albuterol 3 Ml Neb  INHALATION   3 ml





  RT-QID LUCA   Administration


 


Albuterol/Ipratropium  3 ml  03/25/25 19:52 





  Ipratropium-Albuterol 3 Ml Neb  INHALATION  





  RT-TID PRN  





  Shortness Of Breath  


 


Calcium Acetate  1,334 mg  03/26/25 07:30  03/26/25 06:52





  Calcium Acetate 667 Mg Tab  PO   1,334 mg





  TID-W/MEALS LUCA   Administration


 


Fluticasone Propionate  2 spray  03/25/25 19:52 





  Fluticasone Nasal 50mcg/Spray 16gm Btl  EA NOSTRIL  





  DAILY PRN  





  Congestion  


 


Furosemide  80 mg  03/26/25 09:00  03/26/25 08:01





  Furosemide 80 Mg Tab  PO   80 mg





  DAILY LUCA   Administration


 


Lidocaine/Prilocaine  1 applic  03/25/25 19:52 





  Lidocaine-Prilocaine 2.5-2.5% Cream 5 Gm Tube  TOPICAL  





  MOWEFR PRN  





  AVF access  





  Protocol  


 


Metoprolol Succinate  12.5 mg  03/25/25 21:00  03/25/25 21:11





  Metoprolol Succinate (Er) 25 Mg Tab.Er.24h  PO   12.5 mg





  HS LUCA   Administration


 


Morphine Sulfate  4 mg  03/24/25 22:06  03/26/25 06:53





  Morphine Sulfate 4 Mg/Ml Syringe  IV   4 mg





  Q4HR PRN   Administration





  Severe Pain (Scale 7 to 10)  


 


Multivit/Ca Carb/B Cmplx/FA/Prenat  1 each  03/25/25 21:00  03/25/25 21:10





  Folic Acid-Vit B Complex-Vit C 1 Cap  PO   1 each





  HS LUCA   Administration


 


Naloxone HCl  0.2 mg  03/24/25 22:06 





  Naloxone 0.4 Mg/Ml 1 Ml Vial  IV  





  Q2M PRN  





  Opioid Reversal  


 


Ondansetron HCl  4 mg  03/24/25 22:06  03/26/25 08:01





  Ondansetron 4 Mg/2 Ml Vial  IVP   4 mg





  Q8HR PRN   Administration





  Nausea And Vomiting  


 


Oxycodone/Acetaminophen  1 each  03/25/25 19:52  03/26/25 08:01





  Oxycodone-Apap 10-325mg 1 Each Tab  PO   1 each





  TID PRN   Administration





  Pain 1-6  


 


Prednisone  30 mg  03/26/25 09:00  03/26/25 08:01





  Prednisone 10 Mg Tab  PO   30 mg





  DAILY LUCA   Administration








                                Intake and Output











 03/25/25 03/26/25 03/26/25





 22:59 06:59 14:59


 


Intake Total 600  200


 


Balance 600  200


 


Intake:   


 


  Oral 600  200


 


Other:   


 


  # Voids 1 1 


 


  Weight 102.058 kg  








                                        





                                 03/25/25 06:26 





                                 03/25/25 06:26

## 2025-03-26 NOTE — P.HPIM
History of Present Illness


H&P Date: 03/25/25


Chief Complaint: Shortness of breath/cough





79-year-old female patient with history of hypertension, hyperlipidemia, 

asthma/COPD, hypothyroidism, ESRD/HD, presents to ED for the last week or so, 

the patient has been complaining of cough, and shortness of breath.  She came 

into the emergency room to be evaluated.  The patient tested positive for RSV.  

She does have a nebulizer machine at home but apparently has never been 

diagnosed with a lung condition.  She uses albuterol in the nebulizer machine at

home.  The patient has a history of end-stage renal disease, usually requiring 

hemodialysis on Monday Wednesday Friday, diabetes mellitus, hyperlipidemia, 

hypertension, myocardial infarction, hypothyroidism, obesity, and previous 

cerebral hemorrhage.  The patient has a right upper extremity fistula.  


--chest x-ray shows an elevated right diaphragm, and some changes of mild CHF.  


-BNP was elevated at 7630.  White count 6.9, hemoglobin 10.4, hematocrit 32.8, 

and platelet count normal.  Sodium 133, potassium 5, chloride 92, CO2 33, anion 

gap 8, BUN 47, creatinine 3.93.  Glucose is 202.  AST 48.  Alkaline phosphatase 

254.  N-terminal proBNP is 7630.  She did test positive for RSV.





Review of Systems











REVIEW OF SYSTEMS: 


CONSTITUTIONAL: No fever, no malaise, no fatigue. 


HEENT: No recent visual problems or hearing problems. Denied any sore throat. 


CARDIOVASCULAR: No chest pain, orthopnea, PND, no palpitations, no syncope. 


PULMONARY: No shortness of breath, no cough, no hemoptysis. 


GASTROINTESTINAL: No diarrhea, no nausea, no vomiting, no abdominal pain. 


NEUROLOGICAL: No headaches, no weakness, no numbness. 


HEMATOLOGICAL: Denies any bleeding or petechiae. 


GENITOURINARY: Denies any burning micturition, frequency, or urgency. 


MUSCULOSKELETAL/RHEUMATOLOGICAL: Denies any joint pain, swelling, or any muscle 

pain. 


ENDOCRINE: Denies any polyuria or polydipsia. 





The rest of the 14-point review of systems is negative.








Past Medical History


Past Medical History: Asthma, Cancer, COPD, Dialysis, Hyperlipidemia, 

Hypertension, Myocardial Infarction (MI), Osteoarthritis (OA), Pneumonia, Renal 

Disease, Thyroid Disorder


Additional Past Medical History / Comment(s): Hx Uterine cancer 25 yrs ago, 

hemodialysis Mon, Wed and Fri, varicose veins., "mild heart attack"" "brain 

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 

anemia, current edema rufina feet


Last Myocardial Infarction Date:: unknown


History of Any Multi-Drug Resistant Organisms: None Reported, ESBL


Date of last positivie culture/infection: 3/19/25


MDRO Source:: urine


Past Surgical History: Appendectomy, Bariatric Surgery, Cholecystectomy, Hyster

ectomy, Joint Replacement, Tonsillectomy


Additional Past Surgical History / Comment(s): gastric bypass, surgery after 

fall -fx rt leg and foot, rufina knee replacement, rufina cataracts, surgery x 2 or 

"brain bleed", fistula for dialysis, COLONOSCOPY


Past Anesthesia/Blood Transfusion Reactions: No Reported Reaction, Family 

History of Problems w/ Anesthesia


Additional Past Anesthesia/Blood Transfusion Reaction / Comment(s): MOTHER PONV


Past Psychological History: No Psychological Hx Reported


Smoking Status: Former smoker


Past Alcohol Use History: None Reported


Past Drug Use History: None Reported





- Past Family History


  ** Mother


Family Medical History: Cancer





  ** Father


Family Medical History: Diabetes Mellitus, Hypertension





  ** Sister(s)


Family Medical History: Cancer





Medications and Allergies


                                Home Medications











 Medication  Instructions  Recorded  Confirmed  Type


 


Calcium Acetate 1,334 mg PO TID-W/MEALS 11/09/20 03/25/25 History


 


oxyCODONE-APAP 10-325MG [Percocet 1 tab PO TID PRN 01/07/23 03/25/25 History





 mg]    


 


Albuterol Inhaler [Ventolin Hfa 2 puff INHALATION RT-QID PRN 05/04/23 03/25/25 

History





Inhaler]    


 


Acetaminophen Tab [Tylenol] 650 mg PO Q4HR PRN  tab 12/11/23 03/25/25 Rx


 


Folic Acid/Vit B Complex and C 0.8 mg PO HS 07/15/24 03/25/25 History





[Nephro-Hyacinth Tablet]    


 


Lidocaine-Prilocaine Cream [Emla 1 applic TOPICAL MOWEFR PRN 07/15/24 03/25/25 

History





Cream 2.5%/2.5%]    


 


Furosemide [Lasix] 80 mg PO DAILY 11/18/24 03/25/25 History


 


Metoprolol Succinate (ER) [Toprol 12.5 mg PO HS #30 tab 11/20/24 03/25/25 Rx





XL]    


 


Fluticasone Propionate [Flonase 2 spray EA NOSTRIL DAILY PRN 03/25/25 03/25/25 

History





Allergy Relief]    


 


Ipratropium-Albuterol Nebulize 3 ml INHALATION RT-TID PRN 03/25/25 03/25/25 

History





[Duoneb 0.5 mg-3 mg/3 ml Soln]    


 


Sulfamethox-Tmp 800-160Mg [Bactrim 0.5 tab PO BID 03/25/25 03/25/25 History





-160 mg]    


 


Vitamin B Complex 1 cap PO HS 03/25/25 03/25/25 History








                                    Allergies











Allergy/AdvReac Type Severity Reaction Status Date / Time


 


cefuroxime Allergy  Rash/Hives Verified 03/25/25 08:30


 


naproxen [From Naprosyn] Allergy  Dyspnea Verified 03/25/25 08:30


 


blood thinner Allergy  "brain Uncoded 03/24/25 16:40





   bleed"  














Physical Exam


Vitals: 


                                   Vital Signs











  Temp Pulse Resp BP Pulse Ox


 


 03/25/25 07:57   69  16  


 


 03/25/25 07:39   78  18  139/61  95


 


 03/25/25 06:09   64  18  129/56  95


 


 03/25/25 05:00   55 L  17  106/51 


 


 03/25/25 02:44   70   


 


 03/25/25 02:35   74   


 


 03/25/25 02:25   58 L  20  138/93  97


 


 03/24/25 23:43   78  18  132/49  100


 


 03/24/25 23:13   95   


 


 03/24/25 23:03   73   


 


 03/24/25 23:02   81  20  99/60  99


 


 03/24/25 21:21   77  18  124/67  96


 


 03/24/25 16:36  99.2 F  103 H  20   95








                                Intake and Output











 03/24/25 03/25/25 03/25/25





 22:59 06:59 14:59


 


Other:   


 


  Weight 102.058 kg  














General appearance: alert, in no apparent distress


Head exam: Present: atraumatic, normocephalic, normal inspection


Eye exam: Present: normal appearance, PERRL, EOMI.  Absent: scleral icterus, 

conjunctival injection, periorbital swelling


ENT exam: Present: normal exam, mucous membranes moist


Neck exam: Present: normal inspection.  Absent: tenderness, meningismus, 

lymphadenopathy


Respiratory exam: Present: respiratory distress, wheezes.  Absent: rales, 

rhonchi, stridor


Cardiovascular Exam: Present: normal rhythm, tachycardia, normal heart sounds.  

Absent: systolic murmur, diastolic murmur, rubs, gallop, clicks


GI/Abdominal exam: Present: soft, normal bowel sounds.  Absent: distended, 

tenderness, guarding, rebound, rigid


Extremities exam: Present: normal inspection, full ROM, normal capillary refill.

 Absent: tenderness, pedal edema, joint swelling, calf tenderness


Back exam: Present: normal inspection


Neurological exam: Present: alert, oriented X3, CN II-XII intact


Psychiatric exam: Present: normal affect, normal mood


Skin exam: Present: warm, dry, intact, normal color.  Absent: rash





Results


CBC & Chem 7: 


                                 03/25/25 06:26





                                 03/25/25 06:26


Labs: 


                  Abnormal Lab Results - Last 24 Hours (Table)











  03/24/25 03/24/25 03/24/25 Range/Units





  19:30 19:30 19:30 


 


RBC  3.53 L    (3.80-5.40)  m/uL


 


Hgb  11.2 L    (11.4-16.0)  gm/dL


 


Hct     (34.0-46.0)  %


 


MCV  101.6 H    (80.0-100.0)  fL


 


Lymphocytes #     (1.0-4.8)  k/uL


 


PT   9.7 L   (10.0-12.5)  sec


 


APTT   19.5 L   (22.0-30.0)  sec


 


Sodium     (137-145)  mmol/L


 


Chloride    93 L  ()  mmol/L


 


Carbon Dioxide    36 H  (22-30)  mmol/L


 


BUN    40 H  (7-17)  mg/dL


 


Creatinine    2.96 H  (0.52-1.04)  mg/dL


 


Glucose     (74-99)  mg/dL


 


AST    53 H  (14-36)  U/L


 


Alkaline Phosphatase    246 H  ()  U/L


 


RSV (PCR)     (Not Detectd)  














  03/24/25 03/25/25 03/25/25 Range/Units





  19:30 06:26 06:26 


 


RBC   3.19 L   (3.80-5.40)  m/uL


 


Hgb   10.4 L   (11.4-16.0)  gm/dL


 


Hct   32.8 L   (34.0-46.0)  %


 


MCV   102.9 H   (80.0-100.0)  fL


 


Lymphocytes #   0.4 L   (1.0-4.8)  k/uL


 


PT     (10.0-12.5)  sec


 


APTT     (22.0-30.0)  sec


 


Sodium    133 L  (137-145)  mmol/L


 


Chloride    92 L  ()  mmol/L


 


Carbon Dioxide    33 H  (22-30)  mmol/L


 


BUN    47 H  (7-17)  mg/dL


 


Creatinine    3.93 H  (0.52-1.04)  mg/dL


 


Glucose    202 H  (74-99)  mg/dL


 


AST    48 H  (14-36)  U/L


 


Alkaline Phosphatase    254 H  ()  U/L


 


RSV (PCR)  Detected A    (Not Detectd)  














Assessment and Plan


Assessment: 





1.  Severe acute bronchitis/RSV infection


-Patient has been placed on IV steroid; bronchodilator nebulizer treatments 4 

times daily and as needed


-O2 per nasal cannula keeping O2 saturation greater than 92%


Consult pulmonary





2.  Mild hyponatremia; likely fluid overload given history of end-stage renal 

disease; we will trend electrolytes





3.  End-stage renal disease/HD; patient undergoes dialysis every Monday, Wed

nesday and Friday; nephrology has been consulted; patient remains on Lasix 80 mg

daily





4.  Hypertension; metoprolol 12.5 mg daily





5.  Hyperlipidemia; currently not on any statin therapy





6.  Hypothyroidism per history; patient is not on thyroid replacement; will 

order TSH and free T4





7.  CAD/history of MI





DVT prophylaxis; SCDs/subcu heparin


CODE STATUS; full code

## 2025-03-26 NOTE — P.PN
Subjective


Progress Note Date: 03/26/25





79-year-old female seen in the emergency department, room 11.  For the last week

or so, the patient has been complaining of cough, and shortness of breath.  She 

came into the emergency room to be evaluated.  The patient tested positive for 

RSV.  She has an extensive past medical history.  She sees Dr. Ashraf, and also

follows with cardiology, nephrology, and urology.  The patient is not a smoker. 

Interestingly, she does have a nebulizer machine at home but apparently has 

never been diagnosed with a lung condition.  She uses albuterol in the nebulizer

machine at home.  The patient has a history of end-stage renal disease, usually 

requiring hemodialysis on Monday Wednesday Friday, diabetes mellitus, 

hyperlipidemia, hypertension, myocardial infarction, hypothyroidism, obesity, 

and previous cerebral hemorrhage.  The patient has a right upper extremity 

fistula.  The patient is resting comfortably in the emergency department.  She 

is not receiving any IV fluids.  She is on room air.  Her chest x-ray shows an 

elevated right diaphragm, and some changes of mild CHF.  Her BNP was elevated at

7630.  White count 6.9, hemoglobin 10.4, hematocrit 32.8, and platelet count 

normal.  Sodium 133, potassium 5, chloride 92, CO2 33, anion gap 8, BUN 47, 

creatinine 3.93.  Glucose is 202.  AST 48.  Alkaline phosphatase 254.  N-

terminal proBNP is 7630.  She did test positive for RSV.  Chest x-ray has been 

evaluated.





The patient is seen today March 26, 2025 in follow-up on the regular medical 

floor.  She is awake and alert in no acute distress.  She is maintaining O2 

saturations in the 90s on room air.  She denies any worsening shortness of 

breath, cough or congestion.  No new labs today.  She is continued on DuoNeb 

inhalations.  Remains on oral diuretics.  The plan is for hemodialysis today





Objective





- Vital Signs


Vital signs: 


                                   Vital Signs











Temp  98.3 F   03/26/25 07:55


 


Pulse  76   03/26/25 12:28


 


Resp  16   03/26/25 07:55


 


BP  190/77   03/26/25 07:55


 


Pulse Ox  96   03/26/25 09:03


 


FiO2  21   03/26/25 09:03








                                 Intake & Output











 03/25/25 03/26/25 03/26/25





 18:59 06:59 18:59


 


Intake Total 600  200


 


Balance 600  200


 


Weight 102.058 kg  


 


Intake:   


 


  Oral 600  200


 


Other:   


 


  # Voids 1 1 














- Exam





GENERAL EXAM: Alert, active, 9-year-old female, on room air, comfortable in no 

apparent distress.


HEAD: Normocephalic.


EYES: Normal reaction of pupils, equal size.


NOSE: Clear with pink turbinates.


THROAT: No erythema or exudates.


NECK: No masses, no JVD.


CHEST: No chest wall deformity.


LUNGS: Equal air entry with crackles in the posterior bases.


CVS: S1 and S2 normal with no audible murmur, regular rhythm.


ABDOMEN: No hepatosplenomegaly, normal bowel sounds, no guarding or rigidity.


SPINE: No scoliosis or deformity


SKIN: No rashes


CENTRAL NERVOUS SYSTEM: No focal deficits, tone is normal in all 4 extremities.


EXTREMITIES: There is no peripheral edema.  No clubbing, no cyanosis.  

Peripheral pulses are intact.





- Labs


CBC & Chem 7: 


                                 03/25/25 06:26





                                 03/25/25 06:26





Assessment and Plan


Assessment: 





Acute viral bronchitis, secondary to RSV infection.





History of end-stage renal disease, currently on Monday, Wednesday, Friday 

hemodialysis.





History of hypertension.





History of myocardial infarction.





History of hyperlipidemia.





History of diabetes mellitus.





History of hypothyroidism.





Prior history of cerebral hemorrhage.





Obesity.





Plan: 





The patient was seen and evaluated


Currently stable and on room air


Plan is for hemodialysis today


Stable for discharge from the pulmonary standpoint


Recommend a short course of prednisone taper 





I have personally seen and examined the patient, performed the documentation and

the assessment and plan as written.  Number of minutes spent on the visit: 10





Dictation was produced using Dragon dictation software.  Please excuse any 

grammatical, word or spelling errors.

## 2025-03-27 LAB
ANION GAP SERPL CALC-SCNC: 11.8 MMOL/L (ref 4–12)
BASOPHILS # BLD AUTO: 0.03 X 10*3/UL (ref 0–0.1)
BASOPHILS NFR BLD AUTO: 0.3 %
BUN SERPL-SCNC: 42.7 MG/DL (ref 9–27)
BUN/CREAT SERPL: 12.2 RATIO (ref 12–20)
CALCIUM SPEC-MCNC: 7.9 MG/DL (ref 8.7–10.3)
CHLORIDE SERPL-SCNC: 91 MMOL/L (ref 96–109)
CHOLEST SERPL-MCNC: 134 MG/DL (ref 0–200)
CO2 SERPL-SCNC: 28.2 MMOL/L (ref 21.6–31.8)
EOSINOPHIL # BLD AUTO: 0.05 X 10*3/UL (ref 0.04–0.35)
EOSINOPHIL NFR BLD AUTO: 0.6 %
ERYTHROCYTE [DISTWIDTH] IN BLOOD BY AUTOMATED COUNT: 2.85 X 10*6/UL (ref 4.1–5.2)
ERYTHROCYTE [DISTWIDTH] IN BLOOD BY AUTOMATED COUNT: 3.23 M/UL (ref 3.8–5.4)
ERYTHROCYTE [DISTWIDTH] IN BLOOD: 14.2 % (ref 11.5–15.5)
ERYTHROCYTE [DISTWIDTH] IN BLOOD: 14.4 % (ref 11.5–14.5)
GLUCOSE SERPL-MCNC: 96 MG/DL (ref 70–110)
HCT VFR BLD AUTO: 29.4 % (ref 37.2–46.3)
HCT VFR BLD AUTO: 33.5 % (ref 34–46)
HDLC SERPL-MCNC: 63.3 MG/DL (ref 40–60)
HGB BLD-MCNC: 10.5 GM/DL (ref 11.4–16)
HGB BLD-MCNC: 9.2 G/DL (ref 12–15)
IMM GRANULOCYTES BLD QL AUTO: 1.1 %
LDLC SERPL CALC-MCNC: 57.9 MG/DL (ref 0–131)
LYMPHOCYTES # SPEC AUTO: 1.99 X 10*3/UL (ref 0.9–5)
LYMPHOCYTES NFR SPEC AUTO: 22.4 %
MCH RBC QN AUTO: 32.3 PG (ref 27–32)
MCH RBC QN AUTO: 32.4 PG (ref 25–35)
MCHC RBC AUTO-ENTMCNC: 31.2 G/DL (ref 31–37)
MCHC RBC AUTO-ENTMCNC: 31.3 G/DL (ref 32–37)
MCV RBC AUTO: 103.2 FL (ref 80–97)
MCV RBC AUTO: 103.7 FL (ref 80–100)
MONOCYTES # BLD AUTO: 0.66 X 10*3/UL (ref 0.2–1)
MONOCYTES NFR BLD AUTO: 7.4 %
NEUTROPHILS # BLD AUTO: 6.04 X 10*3/UL (ref 1.8–7.7)
NEUTROPHILS NFR BLD AUTO: 68.2 %
NRBC BLD AUTO-RTO: 0 X 10*3/UL (ref 0–0.01)
PLATELET # BLD AUTO: 225 K/UL (ref 150–450)
PLATELET # BLD AUTO: 230 X 10*3/UL (ref 140–440)
POTASSIUM SERPL-SCNC: 4.6 MMOL/L (ref 3.5–5.5)
SODIUM SERPL-SCNC: 131 MMOL/L (ref 135–145)
TRIGL SERPL-MCNC: 64 MG/DL (ref 0–149)
VLDLC SERPL CALC-MCNC: 12.8 MG/DL (ref 5–40)
WBC # BLD AUTO: 10.8 K/UL (ref 3.8–10.6)
WBC # BLD AUTO: 8.87 X 10*3/UL (ref 4.5–10)

## 2025-03-27 RX ADMIN — PANTOPRAZOLE SODIUM SCH MG: 40 INJECTION, POWDER, FOR SOLUTION INTRAVENOUS at 13:48

## 2025-03-27 NOTE — P.PN
Subjective





Patient is seen in follow-up for end-stage renal disease.  She is maintained on 

hemodialysis on Monday Wednesday Friday schedule.  No problems with dialysis 

yesterday.  Denies chest pain or shortness of breath.  Currently on room air.





Vital signs are stable.


General: No acute distress.


HEENT: Head exam is unremarkable. 


LUNGS: No audible rhonchi or wheezes.


HEART: Rate and Rhythm are regular. 


ABDOMEN: Obese, nontender.


EXTREMITITES: No edema.





Objective





- Vital Signs


Vital signs: 


                                   Vital Signs











Temp  98.3 F   03/27/25 07:21


 


Pulse  80   03/27/25 08:02


 


Resp  16   03/27/25 07:21


 


BP  131/69   03/27/25 07:21


 


Pulse Ox  94 L  03/27/25 07:53


 


FiO2  21   03/27/25 07:53








                                 Intake & Output











 03/26/25 03/27/25 03/27/25





 18:59 06:59 18:59


 


Intake Total 1000  


 


Output Total 7400  


 


Balance -6400  


 


Intake:   


 


  Oral 600  


 


  Hemodialysis 400  


 


Output:   


 


  Hemodialysis 3900  


 


  Hemodialysis Net Amount 3500  


 


Other:   


 


  Voiding Method  Toilet Toilet


 


  # Voids  2 














- Labs


CBC & Chem 7: 


                                 03/27/25 06:20





                                 03/27/25 06:20


Labs: 


                  Abnormal Lab Results - Last 24 Hours (Table)











  03/27/25 03/27/25 Range/Units





  06:20 06:20 


 


RBC  2.85 L   (4.10-5.20)  X 10*6/uL


 


Hgb  9.2 L   (12.0-15.0)  g/dL


 


Hct  29.4 L   (37.2-46.3)  %


 


MCV  103.2 H   (80.0-97.0)  FL


 


MCH  32.3 H   (27.0-32.0)  pg


 


MCHC  31.3 L   (32.0-37.0)  g/dL


 


Immature Gran #  0.10 H   (0.00-0.04)  X 10*3/uL


 


Sodium   131 L  (135-145)  mmol/L


 


Chloride   91 L  ()  mmol/L


 


BUN   42.7 H  (9.0-27.0)  mg/dL


 


Creatinine   3.5 H  (0.6-1.5)  mg/dL


 


Est GFR (CKD-EPI)   13 L  (>=60)   


 


Calcium   7.9 L  (8.7-10.3)  mg/dL


 


HDL Cholesterol   63.30 H  (40.00-60.00)  mg/dL


 


TSH   10.600 H  (0.350-5.500)  UIU/ML














Assessment and Plan


Plan: 





Assessment:


1.  End-stage renal disease maintained on hemodialysis on Monday Wednesday Friday schedule.


2.  Acute bronchitis secondary to RSV infection.


3.  Acute on chronic diastolic CHF and mild to moderate aortic stenosis.


4.  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 

on PhosLo.


5.  Volume overload.


6.  History of cerebral hemorrhage.





Plan:


Hemodialysis tomorrow.


Maintain Lasix.

## 2025-03-27 NOTE — P.PN
Subjective


Progress Note Date: 03/27/25





79-year-old female seen in the emergency department, room 11.  For the last week

or so, the patient has been complaining of cough, and shortness of breath.  She 

came into the emergency room to be evaluated.  The patient tested positive for 

RSV.  She has an extensive past medical history.  She sees Dr. Ashraf, and also

follows with cardiology, nephrology, and urology.  The patient is not a smoker. 

Interestingly, she does have a nebulizer machine at home but apparently has 

never been diagnosed with a lung condition.  She uses albuterol in the nebulizer

machine at home.  The patient has a history of end-stage renal disease, usually 

requiring hemodialysis on Monday Wednesday Friday, diabetes mellitus, 

hyperlipidemia, hypertension, myocardial infarction, hypothyroidism, obesity, 

and previous cerebral hemorrhage.  The patient has a right upper extremity 

fistula.  The patient is resting comfortably in the emergency department.  She 

is not receiving any IV fluids.  She is on room air.  Her chest x-ray shows an 

elevated right diaphragm, and some changes of mild CHF.  Her BNP was elevated at

7630.  White count 6.9, hemoglobin 10.4, hematocrit 32.8, and platelet count 

normal.  Sodium 133, potassium 5, chloride 92, CO2 33, anion gap 8, BUN 47, 

creatinine 3.93.  Glucose is 202.  AST 48.  Alkaline phosphatase 254.  N-

terminal proBNP is 7630.  She did test positive for RSV.  Chest x-ray has been 

evaluated.





The patient is seen today March 26, 2025 in follow-up on the regular medical 

floor.  She is awake and alert in no acute distress.  She is maintaining O2 

saturations in the 90s on room air.  She denies any worsening shortness of 

breath, cough or congestion.  No new labs today.  She is continued on DuoNeb 

inhalations.  Remains on oral diuretics.  The plan is for hemodialysis today.





The patient is seen today March 27, 2025 in follow-up on the regular medical 

floor.  She is sitting up in bed.  Awake and alert in no acute distress.  

Maintaining O2 saturations in the 90s on room air.  She is afebrile.  

Hemodynamically stable.  Count 10.8.  Hemoglobin 10.5.  Platelets 225.  She 

remains on DuoNeb inhalations, prednisone taper.  She remains on oral diuretics.





Objective





- Vital Signs


Vital signs: 


                                   Vital Signs











Temp  98.3 F   03/27/25 07:21


 


Pulse  76   03/27/25 11:23


 


Resp  16   03/27/25 07:21


 


BP  131/69   03/27/25 07:21


 


Pulse Ox  94 L  03/27/25 07:53


 


FiO2  21   03/27/25 07:53








                                 Intake & Output











 03/26/25 03/27/25 03/27/25





 18:59 06:59 18:59


 


Intake Total 1000  


 


Output Total 7400  


 


Balance -6400  


 


Intake:   


 


  Oral 600  


 


  Hemodialysis 400  


 


Output:   


 


  Hemodialysis 3900  


 


  Hemodialysis Net Amount 3500  


 


Other:   


 


  Voiding Method  Toilet Toilet


 


  # Voids  2 














- Exam





GENERAL EXAM: Alert, 79-year-old female, ambulating in her room, on room air, 

comfortable in no apparent distress.


HEAD: Normocephalic.


EYES: Normal reaction of pupils, equal size.


NOSE: Clear with pink turbinates.


THROAT: No erythema or exudates.


NECK: No masses, no JVD.


CHEST: No chest wall deformity.


LUNGS: Equal air entry with crackles in the posterior bases.


CVS: S1 and S2 normal with no audible murmur, regular rhythm.


ABDOMEN: No hepatosplenomegaly, normal bowel sounds, no guarding or rigidity.


SPINE: No scoliosis or deformity


SKIN: No rashes


CENTRAL NERVOUS SYSTEM: No focal deficits, tone is normal in all 4 extremities.


EXTREMITIES: There is no peripheral edema.  No clubbing, no cyanosis.  

Peripheral pulses are intact.





- Labs


CBC & Chem 7: 


                                 03/27/25 12:55





                                 03/27/25 06:20


Labs: 


                  Abnormal Lab Results - Last 24 Hours (Table)











  03/27/25 03/27/25 03/27/25 Range/Units





  06:20 06:20 12:55 


 


WBC    10.8 H  (3.8-10.6)  k/uL


 


RBC  2.85 L   3.23 L  (4.10-5.20)  X 10*6/uL


 


Hgb  9.2 L   10.5 L  (12.0-15.0)  g/dL


 


Hct  29.4 L   33.5 L  (37.2-46.3)  %


 


MCV  103.2 H   103.7 H  (80.0-97.0)  FL


 


MCH  32.3 H    (27.0-32.0)  pg


 


MCHC  31.3 L    (32.0-37.0)  g/dL


 


Immature Gran #  0.10 H    (0.00-0.04)  X 10*3/uL


 


Sodium   131 L   (135-145)  mmol/L


 


Chloride   91 L   ()  mmol/L


 


BUN   42.7 H   (9.0-27.0)  mg/dL


 


Creatinine   3.5 H   (0.6-1.5)  mg/dL


 


Est GFR (CKD-EPI)   13 L   (>=60)   


 


Calcium   7.9 L   (8.7-10.3)  mg/dL


 


HDL Cholesterol   63.30 H   (40.00-60.00)  mg/dL


 


TSH   10.600 H   (0.350-5.500)  UIU/ML














Assessment and Plan


Assessment: 





Acute viral bronchitis, secondary to RSV infection.





History of end-stage renal disease, currently on Monday, Wednesday, Friday 

hemodialysis.





History of hypertension.





History of myocardial infarction.





History of hyperlipidemia.





History of diabetes mellitus.





History of hypothyroidism.





Prior history of cerebral hemorrhage.





Obesity.





Plan: 





The patient was seen and evaluated


Currently stable and on room air


Stable for discharge from the pulmonary standpoint


Recommend a short course of prednisone taper 


Follow-up closely with her PCP and nephrology





I have personally seen and examined the patient, performed the documentation and

the assessment and plan as written.  Number of minutes spent on the visit: 10





Dictation was produced using Dragon dictation software.  Please excuse any 

grammatical, word or spelling errors.

## 2025-03-27 NOTE — P.PN
Subjective


Progress Note Date: 03/27/25





79-year-old female patient with history of hypertension, hyperlipidemia, 

asthma/COPD, hypothyroidism, ESRD/HD, presents to ED for the last week or so, 

the patient has been complaining of cough, and shortness of breath.  She came 

into the emergency room to be evaluated.  The patient tested positive for RSV.  

She does have a nebulizer machine at home but apparently has never been 

diagnosed with a lung condition.  She uses albuterol in the nebulizer machine at

home.  The patient has a history of end-stage renal disease, usually requiring 

hemodialysis on Monday Wednesday Friday, diabetes mellitus, hyperlipidemia, 

hypertension, myocardial infarction, hypothyroidism, obesity, and previous 

cerebral hemorrhage.  The patient has a right upper extremity fistula.  


--chest x-ray shows an elevated right diaphragm, and some changes of mild CHF.  


-BNP was elevated at 7630.  White count 6.9, hemoglobin 10.4, hematocrit 32.8, 

and platelet count normal.  Sodium 133, potassium 5, chloride 92, CO2 33, anion 

gap 8, BUN 47, creatinine 3.93.  Glucose is 202.  AST 48.  Alkaline phosphatase 

254.  N-terminal proBNP is 7630.  She did test positive for RSV.





3/27/2025


Patient is seen and evaluated in follow-up on the regular medical floor.  She is

sitting up in bed.  Awake and alert in no acute distress.  Maintaining O2 

saturations in the 90s on room air.  She is afebrile.  Hemodynamically stable.  


Blood work reveals white blood count 8.8.  Hemoglobin 9.2.  Platelets 230.  


Blood work review reveals hemoglobin dropping down from 11.2 upon admission down

to 9.2; we will order stool occult blood, start patient on IV Protonix; monitor 

H&H closely


She remains on DuoNeb inhalations, prednisone taper.  She remains on oral 

diuretics.


-Patient has hemodialysis schedule of Monday, Wednesday and Friday; for 

hemodialysis tomorrow morning





Objective





- Vital Signs


Vital signs: 


                                   Vital Signs











Temp  98.3 F   03/27/25 07:21


 


Pulse  76   03/27/25 11:23


 


Resp  16   03/27/25 07:21


 


BP  131/69   03/27/25 07:21


 


Pulse Ox  94 L  03/27/25 07:53


 


FiO2  21   03/27/25 07:53








                                 Intake & Output











 03/26/25 03/27/25 03/27/25





 18:59 06:59 18:59


 


Intake Total 1000  


 


Output Total 7400  


 


Balance -6400  


 


Intake:   


 


  Oral 600  


 


  Hemodialysis 400  


 


Output:   


 


  Hemodialysis 3900  


 


  Hemodialysis Net Amount 3500  


 


Other:   


 


  Voiding Method  Toilet Toilet


 


  # Voids  2 














- Exam





Head exam: Present: atraumatic, normocephalic, normal inspection


Eye exam: Present: normal appearance, PERRL, EOMI.  Absent: scleral icterus, 

conjunctival injection, periorbital swelling


ENT exam: Present: normal exam, mucous membranes moist


Neck exam: Present: normal inspection.  Absent: tenderness, meningismus, 

lymphadenopathy


Respiratory exam: Present: respiratory distress, wheezes.  Absent: rales, 

rhonchi, stridor


Cardiovascular Exam: Present: normal rhythm, tachycardia, normal heart sounds.  

Absent: systolic murmur, diastolic murmur, rubs, gallop, clicks


GI/Abdominal exam: Present: soft, normal bowel sounds.  Absent: distended, 

tenderness, guarding, rebound, rigid


Extremities exam: Present: normal inspection, full ROM, normal capillary refill.

 Absent: tenderness, pedal edema, joint swelling, calf tenderness


Back exam: Present: normal inspection


Neurological exam: Present: alert, oriented X3, CN II-XII intact


Psychiatric exam: Present: normal affect, normal mood


Skin exam: Present: warm, dry, intact, normal color.  Absent: rash





- Labs


CBC & Chem 7: 


                                 03/27/25 12:55





                                 03/27/25 06:20


Labs: 


                  Abnormal Lab Results - Last 24 Hours (Table)











  03/27/25 03/27/25 Range/Units





  06:20 06:20 


 


RBC  2.85 L   (4.10-5.20)  X 10*6/uL


 


Hgb  9.2 L   (12.0-15.0)  g/dL


 


Hct  29.4 L   (37.2-46.3)  %


 


MCV  103.2 H   (80.0-97.0)  FL


 


MCH  32.3 H   (27.0-32.0)  pg


 


MCHC  31.3 L   (32.0-37.0)  g/dL


 


Immature Gran #  0.10 H   (0.00-0.04)  X 10*3/uL


 


Sodium   131 L  (135-145)  mmol/L


 


Chloride   91 L  ()  mmol/L


 


BUN   42.7 H  (9.0-27.0)  mg/dL


 


Creatinine   3.5 H  (0.6-1.5)  mg/dL


 


Est GFR (CKD-EPI)   13 L  (>=60)   


 


Calcium   7.9 L  (8.7-10.3)  mg/dL


 


HDL Cholesterol   63.30 H  (40.00-60.00)  mg/dL


 


TSH   10.600 H  (0.350-5.500)  UIU/ML














Assessment and Plan


Assessment: 





1.  Severe acute bronchitis/RSV infection


-Patient has been placed on IV steroid; bronchodilator nebulizer treatments 4 

times daily and as needed


-O2 per nasal cannula keeping O2 saturation greater than 92%


Consult pulmonary





2.  Mild hyponatremia; likely fluid overload given history of end-stage renal 

disease; we will trend electrolytes





3.  End-stage renal disease/HD; patient undergoes dialysis every Monday, 

Wednesday and Friday; nephrology has been consulted; patient remains on Lasix 80

mg daily





4.  Hypertension; metoprolol 12.5 mg daily





5.  Hyperlipidemia; currently not on any statin therapy





6.  Hypothyroidism per history; patient is not on thyroid replacement; will 

order TSH and free T4





7.  CAD/history of MI





DVT prophylaxis; SCDs/subcu heparin


CODE STATUS; full code

## 2025-03-27 NOTE — P.PN
Subjective





HISTORY OF PRESENT ILLNESS:  





This is a 79-year-old female with a past medical history significant for end-

stage renal disease on hemodialysis, hypertension, hyperlipidemia, diabetes, 

obesity, and mild aortic stenosis. Patient follows in the office with Dr. Hdz. We have been asked to see the patient in consultation for congestive 

heart failure. Patient examined at the bedside.  Patient presented to the 

hospital with a chief complaint of shortness of breath.  Patient was found to be

positive for RSV.  Patient states she is on hemodialysis Monday Wednesday and 

Friday.  She denies missing any treatments of her dialysis.  She is maintained 

on oral diuretics.  Vital signs are stable.





DIAGNOSTICS:


- EKG reveals sinus mechanism with no signs of acute ischemia.


- Chest xray cardiomegaly and mild pulmonary vascular congestion


- Laboratory data: WBC 6.9.  Hemoglobin 10.4.  Platelet count 218.  Sodium 133. 

Potassium 5.0.  BUN 47.  Creatinine 3.93.  proBNP 7630.


- Current home cardiac medications include metoprolol succinate 12.5 mg at night

and Lasix 80 mg daily.


- Echocardiogram obtained this admission reveals ejection fraction 50 to 55%, no

obvious regional wall motion abnormalities, mild to moderate aortic stenosis


-Patient underwent Lexiscan stress test in July 2024 which was negative for 

stress-induced ischemic changes





3/27/2025


Patient examined this morning at the bedside.  Patient states that she does not 

feel well overall.  However she denies chest pain or pressure.  Currently denies

shortness of breath.  Vital signs are stable.





PHYSICAL EXAM: 


VITAL SIGNS: Reviewed.


GENERAL: Well-developed in no acute distress. 


HEENT: Head is normocephalic. Pupils are equal, round. Sclerae anicteric. Mucous

membranes of the mouth are moist. Neck supple. No JVD or thyromegaly


LUNGS: Respirations even and unlabored. Lungs diminished bilaterally


HEART: Regular rate and rhythm.  S1 and S2 heard.  Systolic murmur noted.


ABDOMEN: Soft. Nondistended. Nontender.


EXTREMITIES: Normal range of motion.  No clubbing or cyanosis.  Peripheral 

pulses intact.  No lower extremity edema


NEUROLOGIC: Awake and alert. Oriented x 3. 





ASSESSMENT: 


Shortness of breath


Acute RSV


End-stage renal disease on hemodialysis


Acute on chronic heart failure with preserved EF


Mild to moderate aortic stenosis


Hypertension


Hyperlipidemia


Diabetes


Obesity: BMI 39.9





PLAN: 


2D echo obtained and reviewed


Continue current cardiac medications


Continue oral diuretics


Continue hemodialysis per nephrology


Patient is currently stable from a cardiac standpoint


We will sign off.  Please reconsult if needed.





Nurse practitioner note has been reviewed by physician. Signing provider agrees 

with the documented findings, assessment, and plan of care documented by NP as a

scribe.











Objective





- Vital Signs


Vital signs: 


                                   Vital Signs











Temp  98.3 F   03/27/25 07:21


 


Pulse  80   03/27/25 08:02


 


Resp  16   03/27/25 07:21


 


BP  131/69   03/27/25 07:21


 


Pulse Ox  94 L  03/27/25 07:53


 


FiO2  21   03/27/25 07:53








                                 Intake & Output











 03/26/25 03/27/25 03/27/25





 18:59 06:59 18:59


 


Intake Total 1000  


 


Output Total 7400  


 


Balance -6400  


 


Intake:   


 


  Oral 600  


 


  Hemodialysis 400  


 


Output:   


 


  Hemodialysis 3900  


 


  Hemodialysis Net Amount 3500  


 


Other:   


 


  Voiding Method  Toilet Toilet


 


  # Voids  2 














- Labs


CBC & Chem 7: 


                                 03/27/25 06:20





                                 03/27/25 06:20


Labs: 


                  Abnormal Lab Results - Last 24 Hours (Table)











  03/27/25 03/27/25 Range/Units





  06:20 06:20 


 


RBC  2.85 L   (4.10-5.20)  X 10*6/uL


 


Hgb  9.2 L   (12.0-15.0)  g/dL


 


Hct  29.4 L   (37.2-46.3)  %


 


MCV  103.2 H   (80.0-97.0)  FL


 


MCH  32.3 H   (27.0-32.0)  pg


 


MCHC  31.3 L   (32.0-37.0)  g/dL


 


Immature Gran #  0.10 H   (0.00-0.04)  X 10*3/uL


 


Sodium   131 L  (135-145)  mmol/L


 


Chloride   91 L  ()  mmol/L


 


BUN   42.7 H  (9.0-27.0)  mg/dL


 


Creatinine   3.5 H  (0.6-1.5)  mg/dL


 


Est GFR (CKD-EPI)   13 L  (>=60)   


 


Calcium   7.9 L  (8.7-10.3)  mg/dL


 


HDL Cholesterol   63.30 H  (40.00-60.00)  mg/dL


 


TSH   10.600 H  (0.350-5.500)  UIU/ML

## 2025-03-28 VITALS
HEART RATE: 69 BPM | TEMPERATURE: 97.8 F | RESPIRATION RATE: 19 BRPM | SYSTOLIC BLOOD PRESSURE: 126 MMHG | DIASTOLIC BLOOD PRESSURE: 84 MMHG

## 2025-03-28 LAB
ANION GAP SERPL CALC-SCNC: 13.2 MMOL/L (ref 4–12)
BASOPHILS # BLD AUTO: 0.02 X 10*3/UL (ref 0–0.1)
BASOPHILS NFR BLD AUTO: 0.2 %
BUN SERPL-SCNC: 59 MG/DL (ref 9–27)
BUN/CREAT SERPL: 14.05 RATIO (ref 12–20)
CALCIUM SPEC-MCNC: 7.8 MG/DL (ref 8.7–10.3)
CHLORIDE SERPL-SCNC: 89 MMOL/L (ref 96–109)
CO2 SERPL-SCNC: 24.8 MMOL/L (ref 21.6–31.8)
EOSINOPHIL # BLD AUTO: 0.09 X 10*3/UL (ref 0.04–0.35)
EOSINOPHIL NFR BLD AUTO: 1 %
ERYTHROCYTE [DISTWIDTH] IN BLOOD BY AUTOMATED COUNT: 2.7 X 10*6/UL (ref 4.1–5.2)
ERYTHROCYTE [DISTWIDTH] IN BLOOD: 14.6 % (ref 11.5–14.5)
GLUCOSE SERPL-MCNC: 87 MG/DL (ref 70–110)
HCT VFR BLD AUTO: 27.9 % (ref 37.2–46.3)
HGB BLD-MCNC: 9.1 G/DL (ref 12–15)
IMM GRANULOCYTES BLD QL AUTO: 1 %
LYMPHOCYTES # SPEC AUTO: 1.97 X 10*3/UL (ref 0.9–5)
LYMPHOCYTES NFR SPEC AUTO: 21 %
MCH RBC QN AUTO: 33.7 PG (ref 27–32)
MCHC RBC AUTO-ENTMCNC: 32.6 G/DL (ref 32–37)
MCV RBC AUTO: 103.3 FL (ref 80–97)
MONOCYTES # BLD AUTO: 0.82 X 10*3/UL (ref 0.2–1)
MONOCYTES NFR BLD AUTO: 8.7 %
NEUTROPHILS # BLD AUTO: 6.41 X 10*3/UL (ref 1.8–7.7)
NEUTROPHILS NFR BLD AUTO: 68.1 %
NRBC BLD AUTO-RTO: 0 X 10*3/UL (ref 0–0.01)
PLATELET # BLD AUTO: 221 X 10*3/UL (ref 140–440)
POTASSIUM SERPL-SCNC: 4.6 MMOL/L (ref 3.5–5.5)
SODIUM SERPL-SCNC: 127 MMOL/L (ref 135–145)
WBC # BLD AUTO: 9.4 X 10*3/UL (ref 4.5–10)

## 2025-03-28 RX ADMIN — ONDANSETRON PRN MG: 2 INJECTION INTRAMUSCULAR; INTRAVENOUS at 08:12

## 2025-03-28 RX ADMIN — ACETAMINOPHEN PRN MG: 325 TABLET, FILM COATED ORAL at 13:55

## 2025-03-28 RX ADMIN — CIPROFLOXACIN SCH MG: 500 TABLET, FILM COATED ORAL at 13:35

## 2025-03-28 NOTE — P.PN
Subjective





Patient is seen in follow-up for end-stage renal disease.  She is maintained on 

hemodialysis on Monday Wednesday Friday schedule.  Tolerating dialysis well.  

Denies chest pain or shortness of breath.  Currently on room air.  Denies any 

active bleeding.  Hemoglobin better today.





Vital signs are stable.


General: No acute distress.


HEENT: Head exam is unremarkable. 


LUNGS: No audible rhonchi or wheezes.


HEART: Rate and Rhythm are regular. 


ABDOMEN: Obese, nontender.


EXTREMITITES: No edema.





Objective





- Vital Signs


Vital signs: 


                                   Vital Signs











Temp  98.4 F   03/28/25 07:44


 


Pulse  72   03/28/25 08:35


 


Resp  16   03/28/25 07:44


 


BP  134/79   03/28/25 07:44


 


Pulse Ox  98   03/28/25 08:15


 


FiO2  21   03/28/25 08:15








                                 Intake & Output











 03/27/25 03/28/25 03/28/25





 18:59 06:59 18:59


 


Other:   


 


  Voiding Method Toilet Toilet 


 


  # Voids 3 1 














- Labs


CBC & Chem 7: 


                                 03/27/25 12:55





                                 03/27/25 06:20


Labs: 


                  Abnormal Lab Results - Last 24 Hours (Table)











  03/27/25 Range/Units





  12:55 


 


WBC  10.8 H  (3.8-10.6)  k/uL


 


RBC  3.23 L  (3.80-5.40)  m/uL


 


Hgb  10.5 L  (11.4-16.0)  gm/dL


 


Hct  33.5 L  (34.0-46.0)  %


 


MCV  103.7 H  (80.0-100.0)  fL








                      Microbiology - Last 24 Hours (Table)











 03/26/25 21:22 Urine Culture - Preliminary





 Urine,Clean Catch    Gram Neg Bacilli














Assessment and Plan


Plan: 





Assessment:


1.  End-stage renal disease maintained on hemodialysis on Monday Wednesday Friday schedule.


2.  Acute bronchitis secondary to RSV infection.


3.  Acute on chronic diastolic CHF and mild to moderate aortic stenosis.


4.  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 

on PhosLo.


5.  Volume overload.


6.  History of cerebral hemorrhage.


7.  Anemia of chronic kidney disease.  Hemoglobin at goal today.





Plan:


Currently seen while undergoing hemodialysis.  Next treatment Monday.


Maintain Lasix.

## 2025-03-28 NOTE — P.PN
Subjective


Progress Note Date: 03/28/25


Principal diagnosis: 





Cough and shortness of breath.





79-year-old female seen in the emergency department, room 11.  For the last week

or so, the patient has been complaining of cough, and shortness of breath.  She 

came into the emergency room to be evaluated.  The patient tested positive for 

RSV.  She has an extensive past medical history.  She sees Dr. Ashraf, and also

follows with cardiology, nephrology, and urology.  The patient is not a smoker. 

Interestingly, she does have a nebulizer machine at home but apparently has 

never been diagnosed with a lung condition.  She uses albuterol in the nebulizer

machine at home.  The patient has a history of end-stage renal disease, usually 

requiring hemodialysis on Monday Wednesday Friday, diabetes mellitus, 

hyperlipidemia, hypertension, myocardial infarction, hypothyroidism, obesity, 

and previous cerebral hemorrhage.  The patient has a right upper extremity 

fistula.  The patient is resting comfortably in the emergency department.  She 

is not receiving any IV fluids.  She is on room air.  Her chest x-ray shows an 

elevated right diaphragm, and some changes of mild CHF.  Her BNP was elevated at

7630.  White count 6.9, hemoglobin 10.4, hematocrit 32.8, and platelet count 

normal.  Sodium 133, potassium 5, chloride 92, CO2 33, anion gap 8, BUN 47, 

creatinine 3.93.  Glucose is 202.  AST 48.  Alkaline phosphatase 254.  N-

terminal proBNP is 7630.  She did test positive for RSV.  Chest x-ray has been 

evaluated.





The patient is seen today March 26, 2025 in follow-up on the regular medical 

floor.  She is awake and alert in no acute distress.  She is maintaining O2 

saturations in the 90s on room air.  She denies any worsening shortness of 

breath, cough or congestion.  No new labs today.  She is continued on DuoNeb 

inhalations.  Remains on oral diuretics.  The plan is for hemodialysis today.





The patient is seen today March 27, 2025 in follow-up on the regular medical 

floor.  She is sitting up in bed.  Awake and alert in no acute distress.  

Maintaining O2 saturations in the 90s on room air.  She is afebrile.  Hemo

dynamically stable.  Count 10.8.  Hemoglobin 10.5.  Platelets 225.  She remains 

on DuoNeb inhalations, prednisone taper.  She remains on oral diuretics.





Progress note dated March 28, 2025.





79-year-old female seen today in room 465.  She currently is on room air.  She 

is not receiving any IV fluids.  The patient is having hemodialysis today.  The 

goal of hemodialysis is to remove 3.5 L of fluid.  Clinically, the patient is 

very stable.  She was admitted with a diagnosis of RSV induced bronchitis.  She 

came in with shortness of breath and cough, both of which are much improved.  

Labs today include a white count 9.4, hemoglobin 9.1, hematocrit 27.9, and a 

normal platelet count.  Sodium 127, potassium 4.6, chloride 79, CO2 25, anion 

gap 13, BUN 59, creatinine 4.2.  Calcium is 7.8.  Urine was positive for gram-

negative bacilli.  The patient is currently on ciprofloxacin, 500 mg daily.





Objective





- Vital Signs


Vital signs: 


                                   Vital Signs











Temp  98.4 F   03/28/25 07:44


 


Pulse  80   03/28/25 11:37


 


Resp  16   03/28/25 07:44


 


BP  134/79   03/28/25 07:44


 


Pulse Ox  98   03/28/25 08:15


 


FiO2  21   03/28/25 08:15








                                 Intake & Output











 03/27/25 03/28/25 03/28/25





 18:59 06:59 18:59


 


Other:   


 


  Voiding Method Toilet Toilet Bedside Commode


 


  # Voids 3 1 














- Exam





No acute distress, oriented 3.  Currently on room air.





HEENT examination is grossly unremarkable.  Mucous membranes are moist.  No oral

lesions.





Neck supple.  Full range of motion.  No adenopathy thyromegaly or neck vein 

distention.





Cardiovascular examination reveals regular rhythm rate.  S1-S2 normal.  No S3 or

S4.  No discernible murmur noted.





Lungs reveal moderate bilateral rhonchi.  No wheezes or crackles.  Breath sounds

are equal bilaterally.  Saturations are excellent.





Abdomen soft bowel sounds are heard.  No masses or tenderness.





Extremities are intact.  No cyanosis clubbing or edema.





Skin is without rash or lesion.





Neurologic examination is brief but nonfocal.





- Labs


CBC & Chem 7: 


                                 03/28/25 06:26





                                 03/28/25 06:26


Labs: 


                  Abnormal Lab Results - Last 24 Hours (Table)











  03/27/25 03/28/25 03/28/25 Range/Units





  12:55 06:26 06:26 


 


WBC  10.8 H    (3.8-10.6)  k/uL


 


RBC  3.23 L  2.70 L   (3.80-5.40)  m/uL


 


Hgb  10.5 L  9.1 L   (11.4-16.0)  gm/dL


 


Hct  33.5 L  27.9 L   (34.0-46.0)  %


 


MCV  103.7 H  103.3 H   (80.0-100.0)  fL


 


MCH   33.7 H   (27.0-32.0)  pg


 


RDW   14.6 H   (11.5-14.5)  %


 


MPV   9.4 L   (9.5-12.2)  FL


 


Immature Gran #   0.09 H   (0.00-0.04)  X 10*3/uL


 


Sodium    127 L  (135-145)  mmol/L


 


Chloride    89 L  ()  mmol/L


 


Anion Gap    13.20 H  (4.00-12.00)  mmol/L


 


BUN    59.0 H  (9.0-27.0)  mg/dL


 


Creatinine    4.2 H  (0.6-1.5)  mg/dL


 


Est GFR (CKD-EPI)    10 L  (>=60)   


 


Calcium    7.8 L  (8.7-10.3)  mg/dL








                      Microbiology - Last 24 Hours (Table)











 03/26/25 21:22 Urine Culture - Preliminary





 Urine,Clean Catch    Gram Neg Bacilli














Assessment and Plan


Assessment: 





Acute viral bronchitis, secondary to RSV infection.





History of end-stage renal disease, currently on Monday, Wednesday, Friday 

hemodialysis.





History of hypertension.





History of myocardial infarction.





History of hyperlipidemia.





History of diabetes mellitus.





History of hypothyroidism.





Prior history of cerebral hemorrhage.





Obesity.


Plan: 





Plan dated March 25, 2025.





The patient is seen today in the emergency department, room 11.  Not sure why 

this patient is being admitted.  She has a nebulizer machine with albuterol at 

home.  She could certainly use that.  She could be discharged with some 

corticosteroid, to be taken at home.  She is currently on room air, and no 

respiratory distress.  She is not receiving any IV fluids.  Labs, x-rays, and 

medications are all reviewed.  Prognosis is thought to be generally good.  She 

has no prior history of any lung disease.  Not quite sure why she has a 

nebulizer machine at home, as it was given to her when she was seeing doctors in

[place].  Dictation was produced using Dragon dictation software.  Please 

excuse any grammatical, word or spelling errors.





Plan dated March 28, 2025.





The patient is currently undergoing hemodialysis.  The goal with hemodialysis 

today is to remove 3.5 L of fluid.  Clinically, the patient is very stable.  She

initially came into the emergency department complaining of shortness of breath,

and cough, and tested positive for RSV.  Labs, x-rays, medications are reviewed.

 The patient is stable for discharge from our perspective.  She is not having 

any additional respiratory issues.  Dictation was produced using Dragon 

dictation software.  Please excuse any grammatical, word or spelling errors.








Time with Patient: Less than 30

## 2025-05-01 ENCOUNTER — HOSPITAL ENCOUNTER (OUTPATIENT)
Dept: HOSPITAL 47 - OR | Age: 80
LOS: 1 days | Discharge: HOME | End: 2025-05-02
Attending: UROLOGY
Payer: MEDICARE

## 2025-05-01 DIAGNOSIS — Z88.1: ICD-10-CM

## 2025-05-01 DIAGNOSIS — Z87.440: ICD-10-CM

## 2025-05-01 DIAGNOSIS — C67.4: Primary | ICD-10-CM

## 2025-05-01 DIAGNOSIS — Z91.89: ICD-10-CM

## 2025-05-01 DIAGNOSIS — N18.6: ICD-10-CM

## 2025-05-01 DIAGNOSIS — Z86.73: ICD-10-CM

## 2025-05-01 DIAGNOSIS — I25.2: ICD-10-CM

## 2025-05-01 DIAGNOSIS — Z85.42: ICD-10-CM

## 2025-05-01 DIAGNOSIS — Z88.6: ICD-10-CM

## 2025-05-01 DIAGNOSIS — Z79.899: ICD-10-CM

## 2025-05-01 DIAGNOSIS — I12.0: ICD-10-CM

## 2025-05-01 DIAGNOSIS — Z87.891: ICD-10-CM

## 2025-05-01 DIAGNOSIS — Z99.2: ICD-10-CM

## 2025-05-01 DIAGNOSIS — J44.89: ICD-10-CM

## 2025-05-01 DIAGNOSIS — E78.5: ICD-10-CM

## 2025-05-01 LAB
ALBUMIN SERPL-MCNC: 3.3 G/DL (ref 3.5–5)
ALP SERPL-CCNC: 186 U/L (ref 38–126)
ALT SERPL-CCNC: 13 U/L (ref 4–34)
ANION GAP SERPL CALC-SCNC: 9 MMOL/L
AST SERPL-CCNC: 27 U/L (ref 14–36)
BASOPHILS # BLD AUTO: 0.02 10*3/UL (ref 0–0.1)
BASOPHILS NFR BLD AUTO: 0.3 %
BUN SERPL-SCNC: 32 MG/DL (ref 7–17)
CALCIUM SPEC-MCNC: 8.8 MG/DL (ref 8.4–10.2)
CHLORIDE SERPL-SCNC: 95 MMOL/L (ref 98–107)
CO2 SERPL-SCNC: 33 MMOL/L (ref 22–30)
EOSINOPHIL NFR BLD AUTO: 1.3 %
ERYTHROCYTE [DISTWIDTH] IN BLOOD BY AUTOMATED COUNT: 3.45 10*6/UL (ref 4.1–5.2)
ERYTHROCYTE [DISTWIDTH] IN BLOOD: 14.9 % (ref 11.5–14.5)
GLUCOSE BLD-MCNC: 104 MG/DL (ref 70–110)
GLUCOSE SERPL-MCNC: 105 MG/DL (ref 74–99)
HCT VFR BLD AUTO: 35.7 % (ref 37.2–46.3)
HGB BLD-MCNC: 11.5 G/DL (ref 12–15)
LYMPHOCYTES # SPEC AUTO: 1.13 10*3/UL (ref 0.9–5)
LYMPHOCYTES NFR SPEC AUTO: 14.4 %
MCH RBC QN AUTO: 33.3 PG (ref 27–32)
MCHC RBC AUTO-ENTMCNC: 32.2 G/DL (ref 32–37)
MCV RBC AUTO: 103.5 FL (ref 80–97)
MONOCYTES # BLD AUTO: 0.66 10*3/UL (ref 0.2–1)
MONOCYTES NFR BLD AUTO: 8.4 %
NEUTROPHILS # BLD AUTO: 5.89 10*3/UL (ref 1.8–7.7)
NEUTROPHILS NFR BLD AUTO: 75.1 %
PLATELET # BLD AUTO: 222 10*3/UL (ref 140–440)
POTASSIUM SERPL-SCNC: 3.8 MMOL/L (ref 3.5–5.1)
PROT SERPL-MCNC: 6.2 G/DL (ref 6.3–8.2)
SODIUM SERPL-SCNC: 137 MMOL/L (ref 137–145)
WBC # BLD AUTO: 7.84 10*3/UL (ref 4.5–10)

## 2025-05-01 PROCEDURE — 88341 IMHCHEM/IMCYTCHM EA ADD ANTB: CPT

## 2025-05-01 PROCEDURE — 80053 COMPREHEN METABOLIC PANEL: CPT

## 2025-05-01 PROCEDURE — 88307 TISSUE EXAM BY PATHOLOGIST: CPT

## 2025-05-01 PROCEDURE — 88342 IMHCHEM/IMCYTCHM 1ST ANTB: CPT

## 2025-05-01 PROCEDURE — 90935 HEMODIALYSIS ONE EVALUATION: CPT

## 2025-05-01 PROCEDURE — 52235 CYSTOSCOPY AND TREATMENT: CPT

## 2025-05-01 PROCEDURE — 85025 COMPLETE CBC W/AUTO DIFF WBC: CPT

## 2025-05-01 RX ADMIN — POTASSIUM CHLORIDE ONE MLS: 14.9 INJECTION, SOLUTION INTRAVENOUS at 08:31

## 2025-05-01 RX ADMIN — CEFAZOLIN SODIUM PRN MLS: 10 INJECTION, POWDER, FOR SOLUTION INTRAVENOUS at 07:45

## 2025-05-01 RX ADMIN — DEXAMETHASONE SODIUM PHOSPHATE ONE MG: 4 INJECTION, SOLUTION INTRAMUSCULAR; INTRAVENOUS at 07:14

## 2025-05-01 RX ADMIN — POTASSIUM CHLORIDE SCH: 14.9 INJECTION, SOLUTION INTRAVENOUS at 17:34

## 2025-05-01 RX ADMIN — ACETAMINOPHEN STA MG: 325 TABLET, FILM COATED ORAL at 14:17

## 2025-05-01 RX ADMIN — CEFAZOLIN ONE MLS: 330 INJECTION, POWDER, FOR SOLUTION INTRAMUSCULAR; INTRAVENOUS at 07:02

## 2025-05-01 RX ADMIN — POTASSIUM CHLORIDE ONE MLS: 14.9 INJECTION, SOLUTION INTRAVENOUS at 07:02

## 2025-05-01 RX ADMIN — MORPHINE SULFATE PRN MG: 2 INJECTION, SOLUTION INTRAMUSCULAR; INTRAVENOUS at 17:43

## 2025-05-01 RX ADMIN — MORPHINE SULFATE STA MG: 2 INJECTION, SOLUTION INTRAMUSCULAR; INTRAVENOUS at 14:58

## 2025-05-01 RX ADMIN — ONDANSETRON ONE MG: 2 INJECTION INTRAMUSCULAR; INTRAVENOUS at 07:14

## 2025-05-02 VITALS
TEMPERATURE: 98 F | SYSTOLIC BLOOD PRESSURE: 138 MMHG | DIASTOLIC BLOOD PRESSURE: 52 MMHG | RESPIRATION RATE: 16 BRPM | HEART RATE: 72 BPM

## 2025-05-02 RX ADMIN — CALCIUM CARBONATE (ANTACID) CHEW TAB 500 MG PRN MG: 500 CHEW TAB at 12:42

## 2025-05-14 ENCOUNTER — HOSPITAL ENCOUNTER (OUTPATIENT)
Dept: HOSPITAL 47 - EC | Age: 80
Setting detail: OBSERVATION
LOS: 1 days | Discharge: HOME | End: 2025-05-15
Attending: HOSPITALIST | Admitting: HOSPITALIST
Payer: MEDICARE

## 2025-05-14 DIAGNOSIS — I12.0: ICD-10-CM

## 2025-05-14 DIAGNOSIS — Z66: ICD-10-CM

## 2025-05-14 DIAGNOSIS — E11.22: ICD-10-CM

## 2025-05-14 DIAGNOSIS — Z88.8: ICD-10-CM

## 2025-05-14 DIAGNOSIS — N18.6: ICD-10-CM

## 2025-05-14 DIAGNOSIS — Z85.42: ICD-10-CM

## 2025-05-14 DIAGNOSIS — Z88.1: ICD-10-CM

## 2025-05-14 DIAGNOSIS — I25.2: ICD-10-CM

## 2025-05-14 DIAGNOSIS — F32.A: ICD-10-CM

## 2025-05-14 DIAGNOSIS — I95.1: Primary | ICD-10-CM

## 2025-05-14 DIAGNOSIS — E66.9: ICD-10-CM

## 2025-05-14 DIAGNOSIS — Z79.891: ICD-10-CM

## 2025-05-14 DIAGNOSIS — Z79.899: ICD-10-CM

## 2025-05-14 DIAGNOSIS — Z87.891: ICD-10-CM

## 2025-05-14 DIAGNOSIS — M19.91: ICD-10-CM

## 2025-05-14 DIAGNOSIS — C67.9: ICD-10-CM

## 2025-05-14 DIAGNOSIS — E78.5: ICD-10-CM

## 2025-05-14 DIAGNOSIS — Z86.73: ICD-10-CM

## 2025-05-14 DIAGNOSIS — J44.89: ICD-10-CM

## 2025-05-14 DIAGNOSIS — D63.1: ICD-10-CM

## 2025-05-14 DIAGNOSIS — F41.9: ICD-10-CM

## 2025-05-14 DIAGNOSIS — Z88.6: ICD-10-CM

## 2025-05-14 DIAGNOSIS — Z99.2: ICD-10-CM

## 2025-05-14 LAB
ALBUMIN SERPL-MCNC: 3.5 G/DL (ref 3.5–5)
ALP SERPL-CCNC: 254 U/L (ref 38–126)
ALT SERPL-CCNC: 20 U/L (ref 4–34)
ANION GAP SERPL CALC-SCNC: 10 MMOL/L
AST SERPL-CCNC: 39 U/L (ref 14–36)
BASOPHILS # BLD AUTO: 0.04 10*3/UL (ref 0–0.1)
BASOPHILS NFR BLD AUTO: 0.5 %
BUN SERPL-SCNC: 19 MG/DL (ref 7–17)
CALCIUM SPEC-MCNC: 8.5 MG/DL (ref 8.4–10.2)
CHLORIDE SERPL-SCNC: 95 MMOL/L (ref 98–107)
CO2 SERPL-SCNC: 30 MMOL/L (ref 22–30)
EOSINOPHIL # BLD AUTO: 0.21 10*3/UL (ref 0.04–0.35)
EOSINOPHIL NFR BLD AUTO: 2.4 %
ERYTHROCYTE [DISTWIDTH] IN BLOOD BY AUTOMATED COUNT: 3.02 10*6/UL (ref 4.1–5.2)
ERYTHROCYTE [DISTWIDTH] IN BLOOD: 14.8 % (ref 11.5–14.5)
GLUCOSE SERPL-MCNC: 152 MG/DL (ref 74–99)
HCT VFR BLD AUTO: 31.1 % (ref 37.2–46.3)
HGB BLD-MCNC: 10.2 G/DL (ref 12–15)
INR PPP: 0.9 (ref ?–1.2)
LYMPHOCYTES # SPEC AUTO: 1.16 10*3/UL (ref 0.9–5)
LYMPHOCYTES NFR SPEC AUTO: 13.1 %
MAGNESIUM SPEC-SCNC: 1.9 MG/DL (ref 1.6–2.3)
MCH RBC QN AUTO: 33.8 PG (ref 27–32)
MCHC RBC AUTO-ENTMCNC: 32.8 G/DL (ref 32–37)
MONOCYTES # BLD AUTO: 0.84 10*3/UL (ref 0.2–1)
MONOCYTES NFR BLD AUTO: 9.5 %
NEUTROPHILS # BLD AUTO: 6.54 10*3/UL (ref 1.8–7.7)
PLATELET # BLD AUTO: 224 10*3/UL (ref 140–440)
POTASSIUM SERPL-SCNC: 3.4 MMOL/L (ref 3.5–5.1)
PROT SERPL-MCNC: 6.4 G/DL (ref 6.3–8.2)
PT BLD: 10.1 SEC (ref 10–12.5)
SODIUM SERPL-SCNC: 135 MMOL/L (ref 137–145)
WBC # BLD AUTO: 8.83 10*3/UL (ref 4.5–10)

## 2025-05-14 PROCEDURE — 80053 COMPREHEN METABOLIC PANEL: CPT

## 2025-05-14 PROCEDURE — 93270 REMOTE 30 DAY ECG REV/REPORT: CPT

## 2025-05-14 PROCEDURE — 72125 CT NECK SPINE W/O DYE: CPT

## 2025-05-14 PROCEDURE — 99285 EMERGENCY DEPT VISIT HI MDM: CPT

## 2025-05-14 PROCEDURE — 36415 COLL VENOUS BLD VENIPUNCTURE: CPT

## 2025-05-14 PROCEDURE — 71045 X-RAY EXAM CHEST 1 VIEW: CPT

## 2025-05-14 PROCEDURE — 85730 THROMBOPLASTIN TIME PARTIAL: CPT

## 2025-05-14 PROCEDURE — 85610 PROTHROMBIN TIME: CPT

## 2025-05-14 PROCEDURE — 96360 HYDRATION IV INFUSION INIT: CPT

## 2025-05-14 PROCEDURE — 70450 CT HEAD/BRAIN W/O DYE: CPT

## 2025-05-14 PROCEDURE — 93005 ELECTROCARDIOGRAM TRACING: CPT

## 2025-05-14 PROCEDURE — 83735 ASSAY OF MAGNESIUM: CPT

## 2025-05-14 PROCEDURE — 85025 COMPLETE CBC W/AUTO DIFF WBC: CPT

## 2025-05-14 PROCEDURE — 93880 EXTRACRANIAL BILAT STUDY: CPT

## 2025-05-14 PROCEDURE — 83605 ASSAY OF LACTIC ACID: CPT

## 2025-05-14 PROCEDURE — 84484 ASSAY OF TROPONIN QUANT: CPT

## 2025-05-14 RX ADMIN — NICARDIPINE HYDROCHLORIDE STA MLS/HR: 2.5 INJECTION INTRAVENOUS at 17:11

## 2025-05-14 RX ADMIN — CEFAZOLIN SCH MLS/HR: 330 INJECTION, POWDER, FOR SOLUTION INTRAMUSCULAR; INTRAVENOUS at 22:34

## 2025-05-14 RX ADMIN — CEFAZOLIN STA MLS/HR: 330 INJECTION, POWDER, FOR SOLUTION INTRAMUSCULAR; INTRAVENOUS at 21:29

## 2025-05-15 VITALS — TEMPERATURE: 97.9 F | DIASTOLIC BLOOD PRESSURE: 92 MMHG | SYSTOLIC BLOOD PRESSURE: 135 MMHG | HEART RATE: 69 BPM

## 2025-05-15 VITALS — RESPIRATION RATE: 18 BRPM

## 2025-05-15 RX ADMIN — MIDODRINE HYDROCHLORIDE SCH MG: 5 TABLET ORAL at 12:37

## 2025-05-15 RX ADMIN — OXYCODONE HYDROCHLORIDE AND ACETAMINOPHEN STA EACH: 10; 325 TABLET ORAL at 05:56

## 2025-05-22 ENCOUNTER — HOSPITAL ENCOUNTER (OUTPATIENT)
Dept: HOSPITAL 47 - RADCTMAIN | Age: 80
Discharge: HOME | End: 2025-05-22
Attending: UROLOGY
Payer: MEDICARE

## 2025-05-22 DIAGNOSIS — Z90.49: ICD-10-CM

## 2025-05-22 DIAGNOSIS — C67.9: Primary | ICD-10-CM

## 2025-05-22 DIAGNOSIS — K83.8: ICD-10-CM

## 2025-05-22 PROCEDURE — 74176 CT ABD & PELVIS W/O CONTRAST: CPT

## 2025-07-19 ENCOUNTER — HOSPITAL ENCOUNTER (EMERGENCY)
Dept: HOSPITAL 47 - EC | Age: 80
Discharge: HOME | End: 2025-07-19
Payer: MEDICARE

## 2025-07-19 VITALS — TEMPERATURE: 98.2 F | HEART RATE: 82 BPM | SYSTOLIC BLOOD PRESSURE: 131 MMHG | DIASTOLIC BLOOD PRESSURE: 63 MMHG

## 2025-07-19 VITALS — RESPIRATION RATE: 18 BRPM

## 2025-07-19 DIAGNOSIS — M47.816: ICD-10-CM

## 2025-07-19 DIAGNOSIS — Z88.6: ICD-10-CM

## 2025-07-19 DIAGNOSIS — Z88.8: ICD-10-CM
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
## 2025-07-19 DIAGNOSIS — Z87.891: ICD-10-CM

## 2025-07-19 DIAGNOSIS — Z88.1: ICD-10-CM

## 2025-07-19 DIAGNOSIS — S39.012A: Primary | ICD-10-CM

## 2025-07-19 DIAGNOSIS — G89.29: ICD-10-CM

## 2025-07-19 LAB
ANION GAP SERPL CALC-SCNC: 8 MMOL/L
ANISOCYTOSIS BLD QL SMEAR: (no result)
BASO STIPL BLD QL SMEAR: (no result)
BASOPHILS # BLD AUTO: 0.03 10*3/UL (ref 0–0.1)
BASOPHILS NFR BLD AUTO: 0.4 %
BUN SERPL-SCNC: 22 MG/DL (ref 7–17)
BURR CELLS BLD QL SMEAR: (no result)
CALCIUM SPEC-MCNC: 8.6 MG/DL (ref 8.4–10.2)
CHLORIDE SERPL-SCNC: 96 MMOL/L (ref 98–107)
CO2 SERPL-SCNC: 32 MMOL/L (ref 22–30)
CREATININE: 3.65 MG/DL (ref 0.52–1.04)
DACRYOCYTES BLD QL SMEAR: (no result)
DOHLE BOD BLD QL SMEAR: (no result)
EOSINOPHIL # BLD AUTO: 0.05 10*3/UL (ref 0.04–0.35)
EOSINOPHIL NFR BLD AUTO: 0.7 %
ERYTHROCYTE [DISTWIDTH] IN BLOOD BY AUTOMATED COUNT: 2.98 10*6/UL (ref 4.1–5.2)
ERYTHROCYTE [DISTWIDTH] IN BLOOD: 14.7 % (ref 11.5–14.5)
GLUCOSE SERPL-MCNC: 110 MG/DL (ref 74–99)
HCT VFR BLD AUTO: 30.9 % (ref 37.2–46.3)
HGB BLD-MCNC: 9.7 G/DL (ref 12–15)
HOWELL-JOLLY BOD BLD QL SMEAR: (no result)
HYPOCHROMIA BLD QL SMEAR: (no result)
IMM GRANULOCYTES # BLD: 0.05 10*3/UL (ref 0–0.04)
LG PLATELETS BLD QL SMEAR: (no result)
LYMPHOCYTES # SPEC AUTO: 0.92 10*3/UL (ref 0.9–5)
LYMPHOCYTES NFR SPEC AUTO: 12.4 %
Lab: (no result)
MCH RBC QN AUTO: 32.6 PG (ref 27–32)
MCHC RBC AUTO-ENTMCNC: 31.4 G/DL (ref 32–37)
MCV RBC AUTO: 103.7 FL (ref 80–97)
MONOCYTES # BLD AUTO: 0.56 10*3/UL (ref 0.2–1)
MONOCYTES NFR BLD AUTO: 7.6 %
NEUTROPHILS # BLD AUTO: 5.78 10*3/UL (ref 1.8–7.7)
NEUTROPHILS NFR BLD AUTO: 78.2 %
NEUTS HYPERSEG # BLD: (no result) 10*3/UL
NRBC BLD AUTO-RTO: (no result) %
OVALOCYTES BLD QL SMEAR: (no result)
PELGER HUET CELLS BLD QL SMEAR: (no result)
PLATELET # BLD AUTO: 245 10*3/UL (ref 140–440)
PLATELETS.RETICULATED NFR BLD AUTO: (no result) %
PMV BLD AUTO: 8.9 FL (ref 9.5–12.2)
POIKILOCYTOSIS BLD QL SMEAR: (no result)
POIKILOCYTOSIS BLD QL SMEAR: (no result)
POLYCHROMASIA BLD QL SMEAR: (no result)
POTASSIUM SERPL-SCNC: 3.9 MMOL/L (ref 3.5–5.1)
RBC MORPH BLD: (no result)
ROULEAUX BLD QL SMEAR: (no result)
SCHISTOCYTES BLD QL SMEAR: (no result)
SICKLE CELLS BLD QL SMEAR: (no result)
SODIUM SERPL-SCNC: 136 MMOL/L (ref 137–145)
SPHEROCYTES BLD QL SMEAR: (no result)
STOMATOCYTES BLD QL SMEAR: (no result)
TARGETS BLD QL SMEAR: (no result)
TOXIC GRANULES BLD QL SMEAR: (no result)
VARIANT LYMPHS BLD QL SMEAR: (no result)
WBC # BLD AUTO: 7.39 10*3/UL (ref 4.5–10)
WBC NRBC COR # BLD: (no result) K/UL
WBC TOXIC VACUOLES BLD QL SMEAR: (no result)

## 2025-07-19 PROCEDURE — 96361 HYDRATE IV INFUSION ADD-ON: CPT

## 2025-07-19 PROCEDURE — 96374 THER/PROPH/DIAG INJ IV PUSH: CPT

## 2025-07-19 PROCEDURE — 85025 COMPLETE CBC W/AUTO DIFF WBC: CPT

## 2025-07-19 PROCEDURE — 72131 CT LUMBAR SPINE W/O DYE: CPT

## 2025-07-19 PROCEDURE — 80048 BASIC METABOLIC PNL TOTAL CA: CPT

## 2025-07-19 PROCEDURE — 93005 ELECTROCARDIOGRAM TRACING: CPT

## 2025-07-19 PROCEDURE — 99284 EMERGENCY DEPT VISIT MOD MDM: CPT

## 2025-07-19 PROCEDURE — 36415 COLL VENOUS BLD VENIPUNCTURE: CPT

## 2025-07-19 RX ADMIN — NICARDIPINE HYDROCHLORIDE STA MLS/HR: 2.5 INJECTION INTRAVENOUS at 14:03

## 2025-07-19 RX ADMIN — MORPHINE SULFATE PRN MG: 4 INJECTION, SOLUTION INTRAMUSCULAR; INTRAVENOUS at 14:04

## 2025-07-20 ENCOUNTER — HOSPITAL ENCOUNTER (INPATIENT)
Dept: HOSPITAL 47 - EC | Age: 80
LOS: 5 days | Discharge: NURSING, REHAB OR LONG-TERM CARE | DRG: 689 | End: 2025-07-25
Attending: HOSPITALIST | Admitting: HOSPITALIST
Payer: MEDICARE

## 2025-07-20 VITALS — BODY MASS INDEX: 37.8 KG/M2

## 2025-07-20 DIAGNOSIS — F41.9: ICD-10-CM

## 2025-07-20 DIAGNOSIS — Z87.891: ICD-10-CM

## 2025-07-20 DIAGNOSIS — N30.00: Primary | ICD-10-CM

## 2025-07-20 DIAGNOSIS — J32.0: ICD-10-CM

## 2025-07-20 DIAGNOSIS — M89.8X8: ICD-10-CM

## 2025-07-20 DIAGNOSIS — Z86.0100: ICD-10-CM

## 2025-07-20 DIAGNOSIS — Z16.12: ICD-10-CM

## 2025-07-20 DIAGNOSIS — F32.A: ICD-10-CM

## 2025-07-20 DIAGNOSIS — D53.9: ICD-10-CM

## 2025-07-20 DIAGNOSIS — E78.5: ICD-10-CM

## 2025-07-20 DIAGNOSIS — Z88.1: ICD-10-CM

## 2025-07-20 DIAGNOSIS — Z79.899: ICD-10-CM

## 2025-07-20 DIAGNOSIS — D63.1: ICD-10-CM

## 2025-07-20 DIAGNOSIS — N18.6: ICD-10-CM

## 2025-07-20 DIAGNOSIS — Z99.2: ICD-10-CM

## 2025-07-20 DIAGNOSIS — G92.8: ICD-10-CM

## 2025-07-20 DIAGNOSIS — I12.0: ICD-10-CM

## 2025-07-20 DIAGNOSIS — Z66: ICD-10-CM

## 2025-07-20 DIAGNOSIS — Z98.84: ICD-10-CM

## 2025-07-20 DIAGNOSIS — C55: ICD-10-CM

## 2025-07-20 DIAGNOSIS — M19.91: ICD-10-CM

## 2025-07-20 DIAGNOSIS — Z90.710: ICD-10-CM

## 2025-07-20 DIAGNOSIS — G89.29: ICD-10-CM

## 2025-07-20 DIAGNOSIS — Z98.41: ICD-10-CM

## 2025-07-20 DIAGNOSIS — Z86.73: ICD-10-CM

## 2025-07-20 DIAGNOSIS — E66.9: ICD-10-CM

## 2025-07-20 DIAGNOSIS — I25.2: ICD-10-CM

## 2025-07-20 DIAGNOSIS — Z85.42: ICD-10-CM

## 2025-07-20 DIAGNOSIS — Z98.42: ICD-10-CM

## 2025-07-20 DIAGNOSIS — B96.20: ICD-10-CM

## 2025-07-20 DIAGNOSIS — Z88.5: ICD-10-CM

## 2025-07-20 DIAGNOSIS — E11.22: ICD-10-CM

## 2025-07-20 DIAGNOSIS — Z87.01: ICD-10-CM

## 2025-07-20 DIAGNOSIS — J44.89: ICD-10-CM

## 2025-07-20 DIAGNOSIS — R56.9: ICD-10-CM

## 2025-07-20 DIAGNOSIS — C67.9: ICD-10-CM

## 2025-07-20 DIAGNOSIS — Z85.51: ICD-10-CM

## 2025-07-20 DIAGNOSIS — Z90.49: ICD-10-CM

## 2025-07-20 DIAGNOSIS — Z96.653: ICD-10-CM

## 2025-07-20 DIAGNOSIS — G93.89: ICD-10-CM

## 2025-07-20 LAB
ACANTHOCYTES BLD QL SMEAR: (no result)
AGRAN PLATELETS BLD QL SMEAR: (no result)
ALBUMIN SERPL-MCNC: 2.9 G/DL (ref 3.5–5)
ALP SERPL-CCNC: 171 U/L (ref 38–126)
ALT SERPL-CCNC: 12 U/L (ref 4–34)
AMORPH SED URNS QL MICRO: (no result) /HPF
AMPHET UR QL SCN: NOT DETECTED
ANION GAP SERPL CALC-SCNC: 11 MMOL/L
ANISOCYTOSIS BLD QL SMEAR: (no result)
ANISOCYTOSIS BLD QL SMEAR: (no result)
ANISOCYTOSIS BLD QL: (no result)
APPEARANCE UR: (no result)
APTT BLD: 22.7 SEC (ref 22–30)
AST SERPL-CCNC: 27 U/L (ref 14–36)
AUER BODIES BLD QL SMEAR: (no result)
BACTERIA UR QL AUTO: (no result) /HPF
BARBITURATES UR QL SCN: NOT DETECTED
BASO STIPL BLD QL SMEAR: (no result)
BASO STIPL BLD QL SMEAR: (no result)
BASOPHILS # BLD AUTO: 0.02 10*3/UL (ref 0–0.1)
BASOPHILS # BLD MANUAL: (no result) K/UL (ref 0–0.2)
BASOPHILS NFR BLD AUTO: 0.3 %
BASOPHILS NFR SPEC MANUAL: (no result) %
BENZODIAZ UR QL SCN: NOT DETECTED
BILIRUB BLD-MCNC: 0.6 MG/DL (ref 0.2–1.3)
BILIRUB UR QL CFM: (no result)
BILIRUB UR QL STRIP.AUTO: NEGATIVE
BLASTS # BLD MANUAL: (no result) %
BLASTS # BLD MANUAL: (no result) K/UL
BROAD CASTS [PRESENCE] IN URINE BY COMPUTER ASSISTED METHOD: (no result) /LPF
BUN SERPL-SCNC: 29 MG/DL (ref 7–17)
BUN/CREAT SERPL: (no result)
BURR CELLS BLD QL SMEAR: (no result)
CA CARBONATE CRY #/AREA URNS HPF: (no result) /HPF
CA PHOS CRY UR QL COMP ASSIST: (no result) /HPF
CALCIUM SPEC-MCNC: 8.7 MG/DL (ref 8.4–10.2)
CANNABINOIDS UR QL SCN: NOT DETECTED
CAOX CRY UR QL COMP ASSIST: (no result) /HPF
CASTS URNS QL MICRO: (no result) /LPF
CELLS COUNTED: (no result)
CHLORIDE SERPL-SCNC: 97 MMOL/L (ref 98–107)
CO2 SERPL-SCNC: 28 MMOL/L (ref 22–30)
COCAINE UR QL SCN: NOT DETECTED
COLOR UR: COLORLESS
CREATININE: 4.36 MG/DL (ref 0.52–1.04)
CRYSTALS UR QL: (no result) /HPF
CYSTINE CRY #/AREA URNS HPF: (no result) /HPF
DACRYOCYTES BLD QL SMEAR: (no result)
DACRYOCYTES BLD QL SMEAR: (no result)
DOHLE BOD BLD QL SMEAR: (no result)
DOHLE BOD BLD QL SMEAR: (no result)
ELLIPTOCYTES BLD QL SMEAR: (no result)
EOSINOPHIL # BLD AUTO: 0 10*3/UL (ref 0.04–0.35)
EOSINOPHIL # BLD MANUAL: (no result) K/UL (ref 0–0.7)
EOSINOPHIL NFR BLD AUTO: 0 %
EOSINOPHIL NFR BLD MANUAL: (no result) %
EPITHELIAL CASTS [PRESENCE] IN URINE BY COMPUTER ASSISTED METHOD: (no result) /LPF
ERYTHROCYTE CLUMPS [PRESENCE] IN URINE BY COMPUTER ASSISTED METHOD: (no result) /HPF
ERYTHROCYTE [DISTWIDTH] IN BLOOD BY AUTOMATED COUNT: 2.99 10*6/UL (ref 4.1–5.2)
ERYTHROCYTE [DISTWIDTH] IN BLOOD: 14.6 % (ref 11.5–14.5)
ETHANOL BLD-MCNC: <10 MG/DL
FATTY CASTS #/AREA UR COMP ASSIST: (no result) /LPF
FLUAV RNA SPEC QL NAA+PROBE: NOT DETECTED
FLUBV RNA SPEC QL NAA+PROBE: NOT DETECTED
GIANT PLATELETS BLD QL SMEAR: (no result)
GLUCOSE BLD-MCNC: 91 MG/DL (ref 70–110)
GLUCOSE SERPL-MCNC: 104 MG/DL (ref 74–99)
GLUCOSE UR QL: NEGATIVE
GRAN CASTS UR QL COMP ASSIST: (no result) /LPF
HCO3 BLDV-SCNC: 30 MMOL/L (ref 24–28)
HCT VFR BLD AUTO: 31.2 % (ref 37.2–46.3)
HELMET CELLS BLD QL SMEAR: (no result)
HGB BLD-MCNC: 9.9 G/DL (ref 12–15)
HOWELL-JOLLY BOD BLD QL SMEAR: (no result)
HOWELL-JOLLY BOD BLD QL SMEAR: (no result)
HYALINE CASTS UR QL AUTO: (no result) /LPF (ref 0–2)
HYPERCHROMASIA: (no result)
HYPOCHROMIA BLD QL SMEAR: (no result)
HYPOCHROMIA BLD QL SMEAR: (no result)
HYPOCHROMIA BLD QL: (no result)
IMM GRANULOCYTES # BLD: 0.08 10*3/UL (ref 0–0.04)
IMM GRANULOCYTES BLD QL AUTO: (no result)
INR PPP: 0.9 (ref ?–1.2)
KETONES UR QL STRIP.AUTO: NEGATIVE
LACTATE BLDV-SCNC: 1.4 MMOL/L (ref 0.7–2)
LEUCINE CRYSTALS [PRESENCE] IN URINE BY COMPUTER ASSISTED METHOD: (no result) /HPF
LEUKOCYTE ESTERASE UR QL STRIP.AUTO: (no result)
LG PLATELETS BLD QL SMEAR: (no result)
LG PLATELETS BLD QL SMEAR: (no result)
LYMPHOCYTES # BLD MANUAL: (no result) K/UL (ref 1–4.8)
LYMPHOCYTES # SPEC AUTO: 1.03 10*3/UL (ref 0.9–5)
LYMPHOCYTES NFR BLD MANUAL: (no result) %
LYMPHOCYTES NFR SPEC AUTO: 12.9 %
Lab: (no result)
MACROCYTES BLD QL SMEAR: (no result)
MACROCYTES BLD QL: (no result)
MCH RBC QN AUTO: 33.1 PG (ref 27–32)
MCHC RBC AUTO-ENTMCNC: 31.7 G/DL (ref 32–37)
MCV RBC AUTO: 104.3 FL (ref 80–97)
METAMYELOCYTES # BLD: (no result) K/UL
METAMYELOCYTES NFR BLD MANUAL: (no result) %
METHADONE UR QL SCN: NOT DETECTED
METHAMPHET UR QL SCN: NOT DETECTED
MICROCYTES BLD QL SMEAR: (no result)
MICROCYTOSIS: (no result)
MIXED CELL CASTS UR QL COMP ASSIST: (no result) /LPF
MONOCYTES # BLD AUTO: 0.45 10*3/UL (ref 0.2–1)
MONOCYTES # BLD MANUAL: (no result) K/UL (ref 0–1)
MONOCYTES NFR BLD AUTO: 5.6 %
MONOCYTES NFR BLD MANUAL: (no result) %
MUCOUS THREADS UR QL AUTO: (no result) /HPF
MYELOCYTES # BLD MANUAL: (no result) K/UL
MYELOCYTES NFR BLD MANUAL: (no result) %
NEUTROPHILS # BLD AUTO: 6.39 10*3/UL (ref 1.8–7.7)
NEUTROPHILS NFR BLD AUTO: 80.2 %
NEUTROPHILS NFR BLD MANUAL: (no result) %
NEUTS BAND # BLD MANUAL: (no result) K/UL
NEUTS BAND NFR BLD: (no result) %
NEUTS HYPERSEG # BLD: (no result) 10*3/UL
NEUTS HYPERSEG # BLD: (no result) 10*3/UL
NEUTS SEG # BLD MANUAL: (no result) K/UL (ref 1.3–7.7)
NITRITE UR QL STRIP.AUTO: NEGATIVE
NRBC # BLD: (no result) /100 WBC (ref 0–0)
NRBC BLD AUTO-RTO: (no result) %
NRBC BLD AUTO-RTO: (no result) %
OPIATES UR QL SCN: DETECTED
OTHER CELLS NFR BLD MANUAL: (no result) %
OVAL FAT BODIES #/AREA UR COMP ASSIST: (no result) /HPF
OVALOCYTES BLD QL SMEAR: (no result)
OVALOCYTES BLD QL SMEAR: (no result)
OXYCODONE UR QL SCN: DETECTED
PAPPENHEIMER BOD BLD QL SMEAR: (no result)
PARASITE [PRESENCE] IN BLOOD BY LIGHT MICROSCOPY: (no result)
PCO2 BLDV: 52 MMHG (ref 37–51)
PCP UR QL SCN: NOT DETECTED
PELGER HUET CELLS BLD QL SMEAR: (no result)
PELGER HUET CELLS BLD QL SMEAR: (no result)
PH BLDV: 7.38 [PH] (ref 7.31–7.41)
PH UR: 7 [PH] (ref 5–8)
PLASMA CELLS # BLD MANUAL: (no result) %
PLASMA CELLS # BLD MANUAL: (no result) K/UL
PLATELET # BLD AUTO: 266 10*3/UL (ref 140–440)
PLATELETS.RETICULATED NFR BLD AUTO: (no result) %
PLATELETS.RETICULATED NFR BLD AUTO: (no result) %
PMV BLD AUTO: 9.3 FL (ref 9.5–12.2)
POIKILOCYTOSIS BLD QL SMEAR: (no result)
POIKILOCYTOSIS: (no result)
POLYCHROMASIA BLD QL SMEAR: (no result)
POLYCHROMASIA BLD QL SMEAR: (no result)
POTASSIUM SERPL-SCNC: 4.6 MMOL/L (ref 3.5–5.1)
PROMYELOCYTES # BLD: (no result) K/UL
PROMYELOCYTES NFR BLD MANUAL: (no result) %
PROT SERPL-MCNC: 5.7 G/DL (ref 6.3–8.2)
PROT UR QL SSA: (no result)
PROT UR QL: (no result)
PT BLD: 10.2 SEC (ref 10–12.5)
RBC AGGLUTINATION: (no result)
RBC CASTS UR QL COMP ASSIST: (no result) /LPF
RBC MORPH BLD: (no result)
RBC MORPH BLD: (no result)
RBC UR QL: (no result)
RBC UR QL: >182 /HPF (ref 0–5)
RENAL EPI CELLS UR QL COMP ASSIST: (no result) /HPF
ROULEAUX BLD QL SMEAR: (no result)
ROULEAUX BLD QL SMEAR: (no result)
RSV RNA SPEC QL NAA+PROBE: NOT DETECTED
SARS-COV-2 RNA RESP QL NAA+PROBE: NOT DETECTED
SCHISTOCYTES BLD QL SMEAR: (no result)
SICKLE CELLS BLD QL SMEAR: (no result)
SICKLE CELLS BLD QL SMEAR: (no result)
SMUDGE CELLS BLD QL SMEAR: (no result)
SODIUM SERPL-SCNC: 136 MMOL/L (ref 137–145)
SP GR UR: 1.02 (ref 1–1.03)
SPERM # UR AUTO: (no result) /HPF
SPHEROCYTES BLD QL SMEAR: (no result)
SPHEROCYTES BLD QL SMEAR: (no result)
SQUAMOUS UR QL AUTO: 17 /HPF (ref 0–4)
STOMATOCYTES BLD QL SMEAR: (no result)
STOMATOCYTES BLD QL SMEAR: (no result)
TARGETS BLD QL SMEAR: (no result)
TARGETS BLD QL SMEAR: (no result)
TOXIC GRANULES BLD QL SMEAR: (no result)
TOXIC GRANULES BLD QL SMEAR: (no result)
TRANS CELLS UR QL COMP ASSIST: (no result) /HPF (ref 0–1)
TRI-PHOS CRY UR QL COMP ASSIST: (no result) /HPF
TRICHOMONAS UR QL COMP ASSIST: (no result) /HPF
TRICYCLICS UR-MCNC: NOT DETECTED NG/ML
TYROSINE CRYSTALS [PRESENCE] IN URINE BY COMPUTER ASSISTED METHOD: (no result) /HPF
URATE CRY UR QL COMP ASSIST: (no result) /HPF
UROBILINOGEN UR QL STRIP: <2 MG/DL (ref ?–2)
VARIANT LYMPHS BLD QL SMEAR: (no result)
WAXY CASTS #/AREA UR COMP ASSIST: (no result) /LPF
WBC # BLD AUTO: 7.97 10*3/UL (ref 4.5–10)
WBC # UR AUTO: >182 /HPF (ref 0–5)
WBC CASTS #/AREA URNS LPF: (no result) /LPF
WBC CLUMPS UR QL AUTO: (no result) /HPF
WBC NRBC COR # BLD: (no result) K/UL
WBC NRBC COR # BLD: (no result) K/UL
WBC TOXIC VACUOLES BLD QL SMEAR: (no result)
WBC TOXIC VACUOLES BLD QL SMEAR: (no result)
YEAST BUDDING UR QL COMP ASSIST: (no result) /HPF
YEAST HYPHAE UR QL COMP ASSIST: (no result) /HPF

## 2025-07-20 PROCEDURE — 85610 PROTHROMBIN TIME: CPT

## 2025-07-20 PROCEDURE — 87086 URINE CULTURE/COLONY COUNT: CPT

## 2025-07-20 PROCEDURE — 84439 ASSAY OF FREE THYROXINE: CPT

## 2025-07-20 PROCEDURE — 36415 COLL VENOUS BLD VENIPUNCTURE: CPT

## 2025-07-20 PROCEDURE — 95816 EEG AWAKE AND DROWSY: CPT

## 2025-07-20 PROCEDURE — 70450 CT HEAD/BRAIN W/O DYE: CPT

## 2025-07-20 PROCEDURE — 99285 EMERGENCY DEPT VISIT HI MDM: CPT

## 2025-07-20 PROCEDURE — 82140 ASSAY OF AMMONIA: CPT

## 2025-07-20 PROCEDURE — 96365 THER/PROPH/DIAG IV INF INIT: CPT

## 2025-07-20 PROCEDURE — 84443 ASSAY THYROID STIM HORMONE: CPT

## 2025-07-20 PROCEDURE — 82805 BLOOD GASES W/O2 SATURATION: CPT

## 2025-07-20 PROCEDURE — 80053 COMPREHEN METABOLIC PANEL: CPT

## 2025-07-20 PROCEDURE — 82746 ASSAY OF FOLIC ACID SERUM: CPT

## 2025-07-20 PROCEDURE — 80048 BASIC METABOLIC PNL TOTAL CA: CPT

## 2025-07-20 PROCEDURE — 82803 BLOOD GASES ANY COMBINATION: CPT

## 2025-07-20 PROCEDURE — 72170 X-RAY EXAM OF PELVIS: CPT

## 2025-07-20 PROCEDURE — 96366 THER/PROPH/DIAG IV INF ADDON: CPT

## 2025-07-20 PROCEDURE — 83036 HEMOGLOBIN GLYCOSYLATED A1C: CPT

## 2025-07-20 PROCEDURE — 87636 SARSCOV2 & INF A&B AMP PRB: CPT

## 2025-07-20 PROCEDURE — 82550 ASSAY OF CK (CPK): CPT

## 2025-07-20 PROCEDURE — 71045 X-RAY EXAM CHEST 1 VIEW: CPT

## 2025-07-20 PROCEDURE — 83921 ORGANIC ACID SINGLE QUANT: CPT

## 2025-07-20 PROCEDURE — 87040 BLOOD CULTURE FOR BACTERIA: CPT

## 2025-07-20 PROCEDURE — 96361 HYDRATE IV INFUSION ADD-ON: CPT

## 2025-07-20 PROCEDURE — 85025 COMPLETE CBC W/AUTO DIFF WBC: CPT

## 2025-07-20 PROCEDURE — 72192 CT PELVIS W/O DYE: CPT

## 2025-07-20 PROCEDURE — 36600 WITHDRAWAL OF ARTERIAL BLOOD: CPT

## 2025-07-20 PROCEDURE — 85027 COMPLETE CBC AUTOMATED: CPT

## 2025-07-20 PROCEDURE — 82607 VITAMIN B-12: CPT

## 2025-07-20 PROCEDURE — 93005 ELECTROCARDIOGRAM TRACING: CPT

## 2025-07-20 PROCEDURE — 80320 DRUG SCREEN QUANTALCOHOLS: CPT

## 2025-07-20 PROCEDURE — 85730 THROMBOPLASTIN TIME PARTIAL: CPT

## 2025-07-20 PROCEDURE — 90935 HEMODIALYSIS ONE EVALUATION: CPT

## 2025-07-20 PROCEDURE — 83605 ASSAY OF LACTIC ACID: CPT

## 2025-07-20 PROCEDURE — 80306 DRUG TEST PRSMV INSTRMNT: CPT

## 2025-07-20 PROCEDURE — 81001 URINALYSIS AUTO W/SCOPE: CPT

## 2025-07-20 PROCEDURE — 71046 X-RAY EXAM CHEST 2 VIEWS: CPT

## 2025-07-20 RX ADMIN — CEFAZOLIN ONE MLS/HR: 330 INJECTION, POWDER, FOR SOLUTION INTRAMUSCULAR; INTRAVENOUS at 10:29

## 2025-07-20 RX ADMIN — Medication SCH MG: at 17:42

## 2025-07-20 RX ADMIN — ACETAMINOPHEN PRN MG: 325 TABLET, FILM COATED ORAL at 20:05

## 2025-07-20 RX ADMIN — ENOXAPARIN SODIUM SCH MG: 40 INJECTION SUBCUTANEOUS at 22:41

## 2025-07-20 RX ADMIN — MIDODRINE HYDROCHLORIDE SCH: 5 TABLET ORAL at 16:25

## 2025-07-20 RX ADMIN — POTASSIUM CHLORIDE SCH MLS/HR: 14.9 INJECTION, SOLUTION INTRAVENOUS at 12:28

## 2025-07-20 RX ADMIN — MEROPENEM SCH MLS/HR: 1 INJECTION, POWDER, FOR SOLUTION INTRAVENOUS at 12:28

## 2025-07-21 LAB
ALBUMIN SERPL-MCNC: 2.6 G/DL (ref 3.5–5)
ALBUMIN/GLOB SERPL: 1 {RATIO}
ALP SERPL-CCNC: 157 U/L (ref 38–126)
ALT SERPL-CCNC: 11 U/L (ref 4–34)
ANION GAP SERPL CALC-SCNC: 13 MMOL/L
ARTERIAL PATENCY WRIST A: YES
AST SERPL-CCNC: 29 U/L (ref 14–36)
BASE EXCESS BLDA CALC-SCNC: 0.9 MMOL/L
BASOPHILS # BLD AUTO: 0.02 10*3/UL (ref 0–0.1)
BASOPHILS NFR BLD AUTO: 0.2 %
BILIRUB BLD-MCNC: 0.5 MG/DL (ref 0.2–1.3)
BUN SERPL-SCNC: 35 MG/DL (ref 7–17)
CA-I BLDA-MCNC: (no result) MG/DL (ref 4.5–5.3)
CALCIUM SPEC-MCNC: 8.7 MG/DL (ref 8.4–10.2)
CHLORIDE SERPL-SCNC: 99 MMOL/L (ref 98–107)
CO2 BLDA-SCNC: 28 MMOL/L (ref 19–24)
CO2 SERPL-SCNC: 23 MMOL/L (ref 22–30)
CREATININE: 5.17 MG/DL (ref 0.52–1.04)
EOSINOPHIL # BLD AUTO: 0.01 10*3/UL (ref 0.04–0.35)
EOSINOPHIL NFR BLD AUTO: 0.1 %
ERYTHROCYTE [DISTWIDTH] IN BLOOD BY AUTOMATED COUNT: 2.93 10*6/UL (ref 4.1–5.2)
ERYTHROCYTE [DISTWIDTH] IN BLOOD: 14.8 % (ref 11.5–14.5)
GAS PNL BLDA: 10.2 GM/DL (ref 11.4–16)
GLOBULIN SER CALC-MCNC: 2.7 G/DL
GLUCOSE BLD-MCNC: 87 MG/DL (ref 70–110)
GLUCOSE BLD-MCNC: 89 MG/DL (ref 70–110)
GLUCOSE BLD-MCNC: 97 MG/DL (ref 70–110)
GLUCOSE BLDA-MCNC: (no result) MG/DL (ref 75–99)
GLUCOSE SERPL-MCNC: 91 MG/DL (ref 74–99)
HCO3 BLDA-SCNC: 27 MMOL/L (ref 21–25)
HCT VFR BLD AUTO: 30.2 % (ref 37.2–46.3)
HCT VFR BLDA CALC: (no result) % (ref 34–46)
HGB BLD-MCNC: 9.7 G/DL (ref 12–15)
IMM GRANULOCYTES # BLD: 0.09 10*3/UL (ref 0–0.04)
INHALED O2 CONCENTRATION: 21 %
LACTATE BLDA-MCNC: (no result) MMOL/L (ref 0.5–1.6)
LYMPHOCYTES # SPEC AUTO: 2.07 10*3/UL (ref 0.9–5)
LYMPHOCYTES NFR SPEC AUTO: 21.5 %
MCH RBC QN AUTO: 33.1 PG (ref 27–32)
MCHC RBC AUTO-ENTMCNC: 32.1 G/DL (ref 32–37)
MCV RBC AUTO: 103.1 FL (ref 80–97)
MONOCYTES # BLD AUTO: 0.45 10*3/UL (ref 0.2–1)
MONOCYTES NFR BLD AUTO: 4.7 %
NEUTROPHILS # BLD AUTO: 6.98 10*3/UL (ref 1.8–7.7)
NEUTROPHILS NFR BLD AUTO: 72.6 %
PCO2 BLDA: 46 MMHG (ref 35–45)
PCO2 TEMP ADJ BLDA: (no result) MM[HG]
PH BLDA: 7.37 [PH] (ref 7.35–7.45)
PH TEMP ADJ BLDA: (no result) [PH]
PLATELET # BLD AUTO: 296 10*3/UL (ref 140–440)
PLATELETS.RETICULATED NFR BLD AUTO: (no result) %
PMV BLD AUTO: 9.3 FL (ref 9.5–12.2)
PO2 BLDA: 93 MMHG (ref 83–108)
PO2 TEMP ADJ BLDA: (no result) MM[HG]
POTASSIUM BLDA-SCNC: (no result) MMOL/L (ref 3.4–4.5)
POTASSIUM SERPL-SCNC: 5.5 MMOL/L (ref 3.5–5.1)
PROT SERPL-MCNC: 5.3 G/DL (ref 6.3–8.2)
SAO2 % BLDA: 97.4 % (ref 94–97)
SODIUM BLDA-SCNC: (no result) MMOL/L (ref 135–146)
SODIUM SERPL-SCNC: 135 MMOL/L (ref 137–145)
WBC # BLD AUTO: 9.62 10*3/UL (ref 4.5–10)

## 2025-07-21 PROCEDURE — 5A1D70Z PERFORMANCE OF URINARY FILTRATION, INTERMITTENT, LESS THAN 6 HOURS PER DAY: ICD-10-PCS

## 2025-07-21 RX ADMIN — ONDANSETRON PRN MG: 4 TABLET, ORALLY DISINTEGRATING ORAL at 06:12

## 2025-07-21 RX ADMIN — PANTOPRAZOLE SODIUM SCH MG: 40 INJECTION, POWDER, FOR SOLUTION INTRAVENOUS at 09:31

## 2025-07-21 RX ADMIN — Medication SCH EACH: at 20:43

## 2025-07-21 RX ADMIN — LEVETIRACETAM ONE MG: 100 INJECTION, SOLUTION, CONCENTRATE INTRAVENOUS at 09:33

## 2025-07-21 RX ADMIN — LORAZEPAM STA: 2 INJECTION INTRAMUSCULAR; INTRAVENOUS at 10:32

## 2025-07-21 RX ADMIN — FUROSEMIDE STA MG: 10 INJECTION, SOLUTION INTRAMUSCULAR; INTRAVENOUS at 09:31

## 2025-07-21 RX ADMIN — ENOXAPARIN SODIUM SCH MG: 30 INJECTION SUBCUTANEOUS at 20:45

## 2025-07-22 LAB
ANION GAP SERPL CALC-SCNC: 8 MMOL/L
ANISOCYTOSIS BLD QL SMEAR: (no result)
BASO STIPL BLD QL SMEAR: (no result)
BUN SERPL-SCNC: 25 MG/DL (ref 7–17)
BURR CELLS BLD QL SMEAR: (no result)
CALCIUM SPEC-MCNC: 8.5 MG/DL (ref 8.4–10.2)
CHLORIDE SERPL-SCNC: 94 MMOL/L (ref 98–107)
CO2 SERPL-SCNC: 31 MMOL/L (ref 22–30)
CREATININE: 3.91 MG/DL (ref 0.52–1.04)
DACRYOCYTES BLD QL SMEAR: (no result)
DOHLE BOD BLD QL SMEAR: (no result)
ERYTHROCYTE [DISTWIDTH] IN BLOOD BY AUTOMATED COUNT: 2.88 10*6/UL (ref 4.1–5.2)
ERYTHROCYTE [DISTWIDTH] IN BLOOD: 15.2 % (ref 11.5–14.5)
GLUCOSE BLD-MCNC: 102 MG/DL (ref 70–110)
GLUCOSE BLD-MCNC: 130 MG/DL (ref 70–110)
GLUCOSE BLD-MCNC: 58 MG/DL (ref 70–110)
GLUCOSE BLD-MCNC: 69 MG/DL (ref 70–110)
GLUCOSE BLD-MCNC: 72 MG/DL (ref 70–110)
GLUCOSE BLD-MCNC: 75 MG/DL (ref 70–110)
GLUCOSE BLD-MCNC: 80 MG/DL (ref 70–110)
GLUCOSE SERPL-MCNC: 74 MG/DL (ref 74–99)
HCT VFR BLD AUTO: 29.6 % (ref 37.2–46.3)
HGB BLD-MCNC: 9.3 G/DL (ref 12–15)
HOWELL-JOLLY BOD BLD QL SMEAR: (no result)
HYPOCHROMIA BLD QL SMEAR: (no result)
LG PLATELETS BLD QL SMEAR: (no result)
Lab: (no result)
MCH RBC QN AUTO: 32.3 PG (ref 27–32)
MCHC RBC AUTO-ENTMCNC: 31.4 G/DL (ref 32–37)
MCV RBC AUTO: 102.8 FL (ref 80–97)
NEUTS HYPERSEG # BLD: (no result) 10*3/UL
NRBC BLD AUTO-RTO: (no result) %
OVALOCYTES BLD QL SMEAR: (no result)
PELGER HUET CELLS BLD QL SMEAR: (no result)
PLATELET # BLD AUTO: 243 10*3/UL (ref 140–440)
PLATELETS.RETICULATED NFR BLD AUTO: (no result) %
PMV BLD AUTO: 8.7 FL (ref 9.5–12.2)
POIKILOCYTOSIS BLD QL SMEAR: (no result)
POIKILOCYTOSIS BLD QL SMEAR: (no result)
POLYCHROMASIA BLD QL SMEAR: (no result)
POTASSIUM SERPL-SCNC: 4.2 MMOL/L (ref 3.5–5.1)
RBC MORPH BLD: (no result)
ROULEAUX BLD QL SMEAR: (no result)
SCHISTOCYTES BLD QL SMEAR: (no result)
SICKLE CELLS BLD QL SMEAR: (no result)
SODIUM SERPL-SCNC: 133 MMOL/L (ref 137–145)
SPHEROCYTES BLD QL SMEAR: (no result)
STOMATOCYTES BLD QL SMEAR: (no result)
TARGETS BLD QL SMEAR: (no result)
TOXIC GRANULES BLD QL SMEAR: (no result)
VARIANT LYMPHS BLD QL SMEAR: (no result)
WBC # BLD AUTO: 7.07 10*3/UL (ref 4.5–10)
WBC NRBC COR # BLD: (no result) K/UL
WBC TOXIC VACUOLES BLD QL SMEAR: (no result)

## 2025-07-22 PROCEDURE — 4A00X4Z MEASUREMENT OF CENTRAL NERVOUS ELECTRICAL ACTIVITY, EXTERNAL APPROACH: ICD-10-PCS

## 2025-07-22 RX ADMIN — LORAZEPAM STA MG: 2 INJECTION INTRAMUSCULAR; INTRAVENOUS at 07:50

## 2025-07-22 RX ADMIN — DEXTROSE MONOHYDRATE PRN ML: 25 INJECTION, SOLUTION INTRAVENOUS at 00:19

## 2025-07-22 RX ADMIN — LEVETIRACETAM SCH MG: 100 INJECTION, SOLUTION, CONCENTRATE INTRAVENOUS at 08:22

## 2025-07-23 LAB
ACANTHOCYTES BLD QL SMEAR: (no result)
AGRAN PLATELETS BLD QL SMEAR: (no result)
ANION GAP SERPL CALC-SCNC: 7 MMOL/L
ANISOCYTOSIS BLD QL SMEAR: (no result)
ANISOCYTOSIS BLD QL: (no result)
AUER BODIES BLD QL SMEAR: (no result)
BASO STIPL BLD QL SMEAR: (no result)
BASOPHILS # BLD AUTO: 0.01 10*3/UL (ref 0–0.1)
BASOPHILS # BLD MANUAL: (no result) K/UL (ref 0–0.2)
BASOPHILS NFR BLD AUTO: 0.1 %
BASOPHILS NFR SPEC MANUAL: (no result) %
BLASTS # BLD MANUAL: (no result) %
BLASTS # BLD MANUAL: (no result) K/UL
BUN SERPL-SCNC: 36 MG/DL (ref 7–17)
BURR CELLS BLD QL SMEAR: (no result)
BURR CELLS BLD QL SMEAR: (no result)
CALCIUM SPEC-MCNC: 8.4 MG/DL (ref 8.4–10.2)
CELLS COUNTED: (no result)
CHLORIDE SERPL-SCNC: 94 MMOL/L (ref 98–107)
CO2 SERPL-SCNC: 30 MMOL/L (ref 22–30)
CREATININE: 5.18 MG/DL (ref 0.52–1.04)
DACRYOCYTES BLD QL SMEAR: (no result)
DOHLE BOD BLD QL SMEAR: (no result)
ELLIPTOCYTES BLD QL SMEAR: (no result)
EOSINOPHIL # BLD AUTO: 0.1 10*3/UL (ref 0.04–0.35)
EOSINOPHIL # BLD MANUAL: (no result) K/UL (ref 0–0.7)
EOSINOPHIL NFR BLD AUTO: 1.1 %
EOSINOPHIL NFR BLD MANUAL: (no result) %
ERYTHROCYTE [DISTWIDTH] IN BLOOD BY AUTOMATED COUNT: 3.03 10*6/UL (ref 4.1–5.2)
ERYTHROCYTE [DISTWIDTH] IN BLOOD: 15.4 % (ref 11.5–14.5)
GIANT PLATELETS BLD QL SMEAR: (no result)
GLUCOSE BLD-MCNC: 151 MG/DL (ref 70–110)
GLUCOSE BLD-MCNC: 155 MG/DL (ref 70–110)
GLUCOSE BLD-MCNC: 74 MG/DL (ref 70–110)
GLUCOSE BLD-MCNC: 87 MG/DL (ref 70–110)
GLUCOSE SERPL-MCNC: 79 MG/DL (ref 74–99)
HCT VFR BLD AUTO: 30.9 % (ref 37.2–46.3)
HELMET CELLS BLD QL SMEAR: (no result)
HGB BLD-MCNC: 9.8 G/DL (ref 12–15)
HOWELL-JOLLY BOD BLD QL SMEAR: (no result)
HYPERCHROMASIA: (no result)
HYPOCHROMIA BLD QL SMEAR: (no result)
HYPOCHROMIA BLD QL: (no result)
IMM GRANULOCYTES # BLD: 0.07 10*3/UL (ref 0–0.04)
LG PLATELETS BLD QL SMEAR: (no result)
LYMPHOCYTES # BLD MANUAL: (no result) K/UL (ref 1–4.8)
LYMPHOCYTES # SPEC AUTO: 1.63 10*3/UL (ref 0.9–5)
LYMPHOCYTES NFR BLD MANUAL: (no result) %
LYMPHOCYTES NFR SPEC AUTO: 18.1 %
Lab: (no result)
Lab: (no result)
MACROCYTES BLD QL SMEAR: (no result)
MACROCYTES BLD QL: (no result)
MCH RBC QN AUTO: 32.3 PG (ref 27–32)
MCHC RBC AUTO-ENTMCNC: 31.7 G/DL (ref 32–37)
MCV RBC AUTO: 102 FL (ref 80–97)
METAMYELOCYTES # BLD: (no result) K/UL
METAMYELOCYTES NFR BLD MANUAL: (no result) %
MICROCYTES BLD QL SMEAR: (no result)
MICROCYTOSIS: (no result)
MONOCYTES # BLD AUTO: 0.76 10*3/UL (ref 0.2–1)
MONOCYTES # BLD MANUAL: (no result) K/UL (ref 0–1)
MONOCYTES NFR BLD AUTO: 8.4 %
MONOCYTES NFR BLD MANUAL: (no result) %
MYELOCYTES # BLD MANUAL: (no result) K/UL
MYELOCYTES NFR BLD MANUAL: (no result) %
NEUTROPHILS # BLD AUTO: 6.45 10*3/UL (ref 1.8–7.7)
NEUTROPHILS NFR BLD AUTO: 71.5 %
NEUTROPHILS NFR BLD MANUAL: (no result) %
NEUTS BAND # BLD MANUAL: (no result) K/UL
NEUTS BAND NFR BLD: (no result) %
NEUTS HYPERSEG # BLD: (no result) 10*3/UL
NEUTS SEG # BLD MANUAL: (no result) K/UL (ref 1.3–7.7)
NRBC # BLD: (no result) /100 WBC (ref 0–0)
OTHER CELLS NFR BLD MANUAL: (no result) %
OVALOCYTES BLD QL SMEAR: (no result)
PAPPENHEIMER BOD BLD QL SMEAR: (no result)
PARASITE [PRESENCE] IN BLOOD BY LIGHT MICROSCOPY: (no result)
PELGER HUET CELLS BLD QL SMEAR: (no result)
PLASMA CELLS # BLD MANUAL: (no result) %
PLASMA CELLS # BLD MANUAL: (no result) K/UL
PLATELET # BLD AUTO: 211 10*3/UL (ref 140–440)
PLATELETS.RETICULATED NFR BLD AUTO: (no result) %
PMV BLD AUTO: 8.1 FL (ref 9.5–12.2)
POIKILOCYTOSIS BLD QL SMEAR: (no result)
POIKILOCYTOSIS BLD QL SMEAR: (no result)
POIKILOCYTOSIS: (no result)
POLYCHROMASIA BLD QL SMEAR: (no result)
POTASSIUM SERPL-SCNC: 3.7 MMOL/L (ref 3.5–5.1)
PROMYELOCYTES # BLD: (no result) K/UL
PROMYELOCYTES NFR BLD MANUAL: (no result) %
RBC AGGLUTINATION: (no result)
RBC MORPH BLD: (no result)
ROULEAUX BLD QL SMEAR: (no result)
SCHISTOCYTES BLD QL SMEAR: (no result)
SCHISTOCYTES BLD QL SMEAR: (no result)
SICKLE CELLS BLD QL SMEAR: (no result)
SMUDGE CELLS BLD QL SMEAR: (no result)
SODIUM SERPL-SCNC: 131 MMOL/L (ref 137–145)
SPHEROCYTES BLD QL SMEAR: (no result)
STOMATOCYTES BLD QL SMEAR: (no result)
TARGETS BLD QL SMEAR: (no result)
TOXIC GRANULES BLD QL SMEAR: (no result)
VARIANT LYMPHS BLD QL SMEAR: (no result)
VARIANT LYMPHS BLD QL SMEAR: (no result)
WBC # BLD AUTO: 9.02 10*3/UL (ref 4.5–10)
WBC NRBC COR # BLD: (no result) K/UL
WBC TOXIC VACUOLES BLD QL SMEAR: (no result)

## 2025-07-23 RX ADMIN — LEVETIRACETAM SCH MG: 500 TABLET, FILM COATED ORAL at 09:10

## 2025-07-23 RX ADMIN — LORAZEPAM STA MG: 2 INJECTION INTRAMUSCULAR; INTRAVENOUS at 00:16

## 2025-07-23 RX ADMIN — POTASSIUM CHLORIDE SCH MLS/HR: 7.46 INJECTION, SOLUTION INTRAVENOUS at 06:57

## 2025-07-23 RX ADMIN — LEVETIRACETAM STA MG: 250 TABLET, FILM COATED ORAL at 18:54

## 2025-07-24 LAB
ACANTHOCYTES BLD QL SMEAR: (no result)
AGRAN PLATELETS BLD QL SMEAR: (no result)
ANISOCYTOSIS BLD QL SMEAR: (no result)
ANISOCYTOSIS BLD QL: (no result)
AUER BODIES BLD QL SMEAR: (no result)
BASO STIPL BLD QL SMEAR: (no result)
BASOPHILS # BLD AUTO: 0.03 X 10*3/UL (ref 0–0.1)
BASOPHILS # BLD MANUAL: (no result) K/UL (ref 0–0.2)
BASOPHILS NFR BLD AUTO: 0.4 %
BASOPHILS NFR SPEC MANUAL: (no result) %
BLASTS # BLD MANUAL: (no result) %
BLASTS # BLD MANUAL: (no result) K/UL
BURR CELLS BLD QL SMEAR: (no result)
BURR CELLS BLD QL SMEAR: (no result)
CELLS COUNTED: (no result)
DACRYOCYTES BLD QL SMEAR: (no result)
DOHLE BOD BLD QL SMEAR: (no result)
ELLIPTOCYTES BLD QL SMEAR: (no result)
EOSINOPHIL # BLD AUTO: 0.13 X 10*3/UL (ref 0.04–0.35)
EOSINOPHIL # BLD MANUAL: (no result) K/UL (ref 0–0.7)
EOSINOPHIL NFR BLD AUTO: 1.9 %
EOSINOPHIL NFR BLD MANUAL: (no result) %
ERYTHROCYTE [DISTWIDTH] IN BLOOD BY AUTOMATED COUNT: 3.07 X 10*6/UL (ref 4.1–5.2)
ERYTHROCYTE [DISTWIDTH] IN BLOOD: 15.6 % (ref 11.5–14.5)
EST. AVERAGE GLUCOSE BLD GHB EST-MCNC: 94 MG/DL
GIANT PLATELETS BLD QL SMEAR: (no result)
GLUCOSE BLD-MCNC: 72 MG/DL (ref 70–110)
GLUCOSE BLD-MCNC: 77 MG/DL (ref 70–110)
GLUCOSE BLD-MCNC: 85 MG/DL (ref 70–110)
GLUCOSE BLD-MCNC: 86 MG/DL (ref 70–110)
HCT VFR BLD AUTO: 31.8 % (ref 37.2–46.3)
HELMET CELLS BLD QL SMEAR: (no result)
HGB BLD-MCNC: 9.8 G/DL (ref 12–15)
HOWELL-JOLLY BOD BLD QL SMEAR: (no result)
HYPERCHROMASIA: (no result)
HYPOCHROMIA BLD QL SMEAR: (no result)
HYPOCHROMIA BLD QL: (no result)
IMM GRANULOCYTES # BLD: 0.11 X 10*3/UL (ref 0–0.04)
IMM GRANULOCYTES BLD QL AUTO: 1.6 %
LG PLATELETS BLD QL SMEAR: (no result)
LYMPHOCYTES # BLD MANUAL: (no result) K/UL (ref 1–4.8)
LYMPHOCYTES # SPEC AUTO: 1.14 X 10*3/UL (ref 0.9–5)
LYMPHOCYTES NFR BLD MANUAL: (no result) %
LYMPHOCYTES NFR SPEC AUTO: 16.6 %
Lab: (no result)
Lab: (no result)
MACROCYTES BLD QL SMEAR: (no result)
MACROCYTES BLD QL: (no result)
MCH RBC QN AUTO: 31.9 PG (ref 27–32)
MCHC RBC AUTO-ENTMCNC: 30.8 G/DL (ref 32–37)
MCV RBC AUTO: 103.6 FL (ref 80–97)
METAMYELOCYTES # BLD: (no result) K/UL
METAMYELOCYTES NFR BLD MANUAL: (no result) %
MICROCYTES BLD QL SMEAR: (no result)
MICROCYTOSIS: (no result)
MONOCYTES # BLD AUTO: 0.57 X 10*3/UL (ref 0.2–1)
MONOCYTES # BLD MANUAL: (no result) K/UL (ref 0–1)
MONOCYTES NFR BLD AUTO: 8.3 %
MONOCYTES NFR BLD MANUAL: (no result) %
MYELOCYTES # BLD MANUAL: (no result) K/UL
MYELOCYTES NFR BLD MANUAL: (no result) %
NEUTROPHILS # BLD AUTO: 4.89 X 10*3/UL (ref 1.8–7.7)
NEUTROPHILS NFR BLD AUTO: 71.2 %
NEUTROPHILS NFR BLD MANUAL: (no result) %
NEUTS BAND # BLD MANUAL: (no result) K/UL
NEUTS BAND NFR BLD: (no result) %
NEUTS HYPERSEG # BLD: (no result) 10*3/UL
NEUTS SEG # BLD MANUAL: (no result) K/UL (ref 1.3–7.7)
NRBC # BLD: (no result) /100 WBC (ref 0–0)
NRBC BLD AUTO-RTO: 0 X 10*3/UL (ref 0–0.01)
OTHER CELLS NFR BLD MANUAL: (no result) %
OVALOCYTES BLD QL SMEAR: (no result)
PAPPENHEIMER BOD BLD QL SMEAR: (no result)
PARASITE [PRESENCE] IN BLOOD BY LIGHT MICROSCOPY: (no result)
PELGER HUET CELLS BLD QL SMEAR: (no result)
PLASMA CELLS # BLD MANUAL: (no result) %
PLASMA CELLS # BLD MANUAL: (no result) K/UL
PLATELET # BLD AUTO: 213 X 10*3/UL (ref 140–440)
PLATELETS.RETICULATED NFR BLD AUTO: (no result) %
PMV BLD AUTO: 9.1 FL (ref 9.5–12.2)
POIKILOCYTOSIS BLD QL SMEAR: (no result)
POIKILOCYTOSIS BLD QL SMEAR: (no result)
POIKILOCYTOSIS: (no result)
POLYCHROMASIA BLD QL SMEAR: (no result)
PROMYELOCYTES # BLD: (no result) K/UL
PROMYELOCYTES NFR BLD MANUAL: (no result) %
RBC AGGLUTINATION: (no result)
RBC MORPH BLD: (no result)
ROULEAUX BLD QL SMEAR: (no result)
SCHISTOCYTES BLD QL SMEAR: (no result)
SCHISTOCYTES BLD QL SMEAR: (no result)
SICKLE CELLS BLD QL SMEAR: (no result)
SMUDGE CELLS BLD QL SMEAR: (no result)
SPHEROCYTES BLD QL SMEAR: (no result)
STOMATOCYTES BLD QL SMEAR: (no result)
TARGETS BLD QL SMEAR: (no result)
TOXIC GRANULES BLD QL SMEAR: (no result)
TSH SERPL DL<=0.005 MIU/L-ACNC: 6.06 UIU/ML (ref 0.35–5.5)
VARIANT LYMPHS BLD QL SMEAR: (no result)
VARIANT LYMPHS BLD QL SMEAR: (no result)
VIT B12 SERPL-MCNC: 626 PG/ML (ref 200–944)
WBC # BLD AUTO: 6.87 X 10*3/UL (ref 4.5–10)
WBC NRBC COR # BLD: (no result) K/UL
WBC TOXIC VACUOLES BLD QL SMEAR: (no result)

## 2025-07-25 VITALS — HEART RATE: 100 BPM

## 2025-07-25 VITALS — SYSTOLIC BLOOD PRESSURE: 124 MMHG | TEMPERATURE: 97.6 F | RESPIRATION RATE: 16 BRPM | DIASTOLIC BLOOD PRESSURE: 86 MMHG

## 2025-07-25 LAB
ANION GAP SERPL CALC-SCNC: 9.5 MMOL/L (ref 4–12)
BASOPHILS # BLD AUTO: 0.02 X 10*3/UL (ref 0–0.1)
BASOPHILS NFR BLD AUTO: 0.3 %
BUN SERPL-SCNC: 30.7 MG/DL (ref 9–27)
BUN/CREAT SERPL: 6.67 RATIO (ref 12–20)
CALCIUM SPEC-MCNC: 8 MG/DL (ref 8.7–10.3)
CHLORIDE SERPL-SCNC: 95 MMOL/L (ref 96–109)
CO2 SERPL-SCNC: 28.5 MMOL/L (ref 21.6–31.8)
CREATININE: 4.6 MG/DL (ref 0.6–1.5)
EOSINOPHIL # BLD AUTO: 0.21 X 10*3/UL (ref 0.04–0.35)
EOSINOPHIL NFR BLD AUTO: 3.6 %
ERYTHROCYTE [DISTWIDTH] IN BLOOD BY AUTOMATED COUNT: 2.87 X 10*6/UL (ref 4.1–5.2)
ERYTHROCYTE [DISTWIDTH] IN BLOOD: 15.6 % (ref 11.5–14.5)
GLUCOSE BLD-MCNC: 114 MG/DL (ref 70–110)
GLUCOSE BLD-MCNC: 349 MG/DL (ref 70–110)
GLUCOSE BLD-MCNC: 82 MG/DL (ref 70–110)
GLUCOSE BLD-MCNC: 91 MG/DL (ref 70–110)
GLUCOSE SERPL-MCNC: 80 MG/DL (ref 70–110)
HCT VFR BLD AUTO: 29.4 % (ref 37.2–46.3)
HGB BLD-MCNC: 9.3 G/DL (ref 12–15)
IMM GRANULOCYTES # BLD: 0.08 X 10*3/UL (ref 0–0.04)
IMM GRANULOCYTES BLD QL AUTO: 1.4 %
LYMPHOCYTES # SPEC AUTO: 0.92 X 10*3/UL (ref 0.9–5)
LYMPHOCYTES NFR SPEC AUTO: 15.8 %
MCH RBC QN AUTO: 32.4 PG (ref 27–32)
MCHC RBC AUTO-ENTMCNC: 31.6 G/DL (ref 32–37)
MCV RBC AUTO: 102.4 FL (ref 80–97)
MONOCYTES # BLD AUTO: 0.58 X 10*3/UL (ref 0.2–1)
MONOCYTES NFR BLD AUTO: 10 %
NEUTROPHILS # BLD AUTO: 4.01 X 10*3/UL (ref 1.8–7.7)
NEUTROPHILS NFR BLD AUTO: 68.9 %
NRBC BLD AUTO-RTO: 0 X 10*3/UL (ref 0–0.01)
PLATELET # BLD AUTO: 193 X 10*3/UL (ref 140–440)
PMV BLD AUTO: 9.3 FL (ref 9.5–12.2)
POTASSIUM SERPL-SCNC: 4.1 MMOL/L (ref 3.5–5.5)
SODIUM SERPL-SCNC: 133 MMOL/L (ref 135–145)
WBC # BLD AUTO: 5.82 X 10*3/UL (ref 4.5–10)